# Patient Record
Sex: FEMALE | Race: WHITE | Employment: OTHER | URBAN - METROPOLITAN AREA
[De-identification: names, ages, dates, MRNs, and addresses within clinical notes are randomized per-mention and may not be internally consistent; named-entity substitution may affect disease eponyms.]

---

## 2017-02-16 RX ORDER — LOSARTAN POTASSIUM 25 MG/1
TABLET ORAL
Qty: 90 TABLET | Refills: 0 | Status: SHIPPED | OUTPATIENT
Start: 2017-02-16 | End: 2017-05-15 | Stop reason: SDUPTHER

## 2017-05-15 RX ORDER — LOSARTAN POTASSIUM 25 MG/1
TABLET ORAL
Qty: 30 TABLET | Refills: 0 | Status: SHIPPED | OUTPATIENT
Start: 2017-05-15

## 2018-05-29 RX ORDER — LOSARTAN POTASSIUM 25 MG/1
TABLET ORAL
Qty: 90 TABLET | Refills: 0 | OUTPATIENT
Start: 2018-05-29

## 2018-05-30 RX ORDER — LOSARTAN POTASSIUM 25 MG/1
TABLET ORAL
Qty: 90 TABLET | Refills: 0 | OUTPATIENT
Start: 2018-05-30

## 2018-09-10 ENCOUNTER — HOSPITAL ENCOUNTER (OUTPATIENT)
Dept: SURGERY | Age: 68
Discharge: HOME | End: 2018-09-10
Payer: MEDICARE

## 2018-09-10 ENCOUNTER — HOSPITAL ENCOUNTER (OUTPATIENT)
Dept: PHYSICAL THERAPY | Age: 68
Discharge: HOME | End: 2018-09-10
Payer: MEDICARE

## 2018-09-10 DIAGNOSIS — R06.83 SNORING: Primary | ICD-10-CM

## 2018-09-10 LAB
ANION GAP SERPL CALC-SCNC: 7 MMOL/L (ref 7–16)
APPEARANCE UR: ABNORMAL
APTT PPP: 26.9 SEC (ref 23.2–35.3)
ATRIAL RATE: 53 BPM
BACTERIA SPEC CULT: NORMAL
BACTERIA URNS QL MICRO: 0 /HPF
BASOPHILS # BLD: 0.1 K/UL (ref 0–0.2)
BASOPHILS NFR BLD: 1 % (ref 0–2)
BILIRUB UR QL: NEGATIVE
BUN SERPL-MCNC: 23 MG/DL (ref 8–23)
CALCIUM SERPL-MCNC: 9.4 MG/DL (ref 8.3–10.4)
CALCULATED P AXIS, ECG09: 56 DEGREES
CALCULATED R AXIS, ECG10: 60 DEGREES
CALCULATED T AXIS, ECG11: 60 DEGREES
CASTS URNS QL MICRO: ABNORMAL /LPF
CHLORIDE SERPL-SCNC: 107 MMOL/L (ref 98–107)
CO2 SERPL-SCNC: 28 MMOL/L (ref 21–32)
COLOR UR: YELLOW
CREAT SERPL-MCNC: 0.8 MG/DL (ref 0.6–1)
DIAGNOSIS, 93000: NORMAL
DIFFERENTIAL METHOD BLD: NORMAL
EOSINOPHIL # BLD: 0.4 K/UL (ref 0–0.8)
EOSINOPHIL NFR BLD: 7 % (ref 0.5–7.8)
EPI CELLS #/AREA URNS HPF: ABNORMAL /HPF
ERYTHROCYTE [DISTWIDTH] IN BLOOD BY AUTOMATED COUNT: 13.6 %
GLUCOSE SERPL-MCNC: 62 MG/DL (ref 65–100)
GLUCOSE UR STRIP.AUTO-MCNC: NEGATIVE MG/DL
HCT VFR BLD AUTO: 40.6 % (ref 35.8–46.3)
HGB BLD-MCNC: 12.8 G/DL (ref 11.7–15.4)
HGB UR QL STRIP: NEGATIVE
IMM GRANULOCYTES # BLD: 0 K/UL (ref 0–0.5)
IMM GRANULOCYTES NFR BLD AUTO: 0 % (ref 0–5)
INR PPP: 1
KETONES UR QL STRIP.AUTO: NEGATIVE MG/DL
LEUKOCYTE ESTERASE UR QL STRIP.AUTO: ABNORMAL
LYMPHOCYTES # BLD: 1.2 K/UL (ref 0.5–4.6)
LYMPHOCYTES NFR BLD: 20 % (ref 13–44)
MCH RBC QN AUTO: 28.6 PG (ref 26.1–32.9)
MCHC RBC AUTO-ENTMCNC: 31.5 G/DL (ref 31.4–35)
MCV RBC AUTO: 90.6 FL (ref 79.6–97.8)
MONOCYTES # BLD: 0.6 K/UL (ref 0.1–1.3)
MONOCYTES NFR BLD: 11 % (ref 4–12)
NEUTS SEG # BLD: 3.6 K/UL (ref 1.7–8.2)
NEUTS SEG NFR BLD: 61 % (ref 43–78)
NITRITE UR QL STRIP.AUTO: NEGATIVE
NRBC # BLD: 0 K/UL (ref 0–0.2)
P-R INTERVAL, ECG05: 148 MS
PH UR STRIP: 5 [PH] (ref 5–9)
PLATELET # BLD AUTO: 255 K/UL (ref 150–450)
PMV BLD AUTO: 10.4 FL (ref 9.4–12.3)
POTASSIUM SERPL-SCNC: 3.8 MMOL/L (ref 3.5–5.1)
PROT UR STRIP-MCNC: NEGATIVE MG/DL
PROTHROMBIN TIME: 13.5 SEC (ref 11.5–14.5)
Q-T INTERVAL, ECG07: 448 MS
QRS DURATION, ECG06: 90 MS
QTC CALCULATION (BEZET), ECG08: 420 MS
RBC # BLD AUTO: 4.48 M/UL (ref 4.05–5.2)
RBC #/AREA URNS HPF: ABNORMAL /HPF
SERVICE CMNT-IMP: NORMAL
SODIUM SERPL-SCNC: 142 MMOL/L (ref 136–145)
SP GR UR REFRACTOMETRY: 1.02 (ref 1–1.02)
UROBILINOGEN UR QL STRIP.AUTO: 0.2 EU/DL (ref 0.2–1)
VENTRICULAR RATE, ECG03: 53 BPM
WBC # BLD AUTO: 6 K/UL (ref 4.3–11.1)
WBC URNS QL MICRO: ABNORMAL /HPF

## 2018-09-10 PROCEDURE — G8980 MOBILITY D/C STATUS: HCPCS

## 2018-09-10 PROCEDURE — 97161 PT EVAL LOW COMPLEX 20 MIN: CPT

## 2018-09-10 PROCEDURE — 80048 BASIC METABOLIC PNL TOTAL CA: CPT

## 2018-09-10 PROCEDURE — 77030027138 HC INCENT SPIROMETER -A

## 2018-09-10 PROCEDURE — G8979 MOBILITY GOAL STATUS: HCPCS

## 2018-09-10 PROCEDURE — 85610 PROTHROMBIN TIME: CPT

## 2018-09-10 PROCEDURE — G8978 MOBILITY CURRENT STATUS: HCPCS

## 2018-09-10 PROCEDURE — 81001 URINALYSIS AUTO W/SCOPE: CPT

## 2018-09-10 PROCEDURE — 93005 ELECTROCARDIOGRAM TRACING: CPT | Performed by: ANESTHESIOLOGY

## 2018-09-10 PROCEDURE — 87641 MR-STAPH DNA AMP PROBE: CPT

## 2018-09-10 PROCEDURE — 85730 THROMBOPLASTIN TIME PARTIAL: CPT

## 2018-09-10 PROCEDURE — 85025 COMPLETE CBC W/AUTO DIFF WBC: CPT

## 2018-09-10 RX ORDER — FLUOXETINE 10 MG/1
20 CAPSULE ORAL DAILY
COMMUNITY

## 2018-09-10 RX ORDER — VERAPAMIL HYDROCHLORIDE 120 MG/1
120 TABLET, FILM COATED, EXTENDED RELEASE ORAL DAILY
COMMUNITY
End: 2018-12-03 | Stop reason: CLARIF

## 2018-09-10 RX ORDER — ASPIRIN 81 MG/1
81 TABLET ORAL
COMMUNITY
End: 2018-09-26

## 2018-09-10 RX ORDER — NAPROXEN SODIUM 220 MG
220 TABLET ORAL AS NEEDED
COMMUNITY
End: 2018-09-26

## 2018-09-10 RX ORDER — CETIRIZINE HCL 10 MG
TABLET ORAL
COMMUNITY

## 2018-09-10 RX ORDER — VERAPAMIL HYDROCHLORIDE 120 MG/1
120 TABLET, FILM COATED ORAL DAILY
COMMUNITY
End: 2018-09-10 | Stop reason: CLARIF

## 2018-09-10 RX ORDER — VERAPAMIL HYDROCHLORIDE 120 MG/1
TABLET, FILM COATED, EXTENDED RELEASE ORAL
COMMUNITY
End: 2018-09-10 | Stop reason: CLARIF

## 2018-09-10 RX ORDER — METRONIDAZOLE 7.5 MG/G
CREAM TOPICAL
COMMUNITY
End: 2019-03-07

## 2018-09-10 RX ORDER — FLUTICASONE PROPIONATE 50 MCG
2 SPRAY, SUSPENSION (ML) NASAL AS NEEDED
COMMUNITY

## 2018-09-10 RX ORDER — ROPINIROLE 1 MG/1
1 TABLET, FILM COATED ORAL
COMMUNITY

## 2018-09-10 RX ORDER — GABAPENTIN 300 MG/1
300 CAPSULE ORAL 3 TIMES DAILY
COMMUNITY

## 2018-09-10 RX ORDER — BUTALBITAL, ACETAMINOPHEN, CAFFEINE AND CODEINE PHOSPHATE 50; 325; 40; 30 MG/1; MG/1; MG/1; MG/1
CAPSULE ORAL AS NEEDED
COMMUNITY
End: 2018-09-26

## 2018-09-10 NOTE — PROGRESS NOTES
Donn Rosas  : [birth year](67 y.o.) Joint Glory Spicer at [address], [place] [address].  [phone]       Physical Therapy Prehab Plan of Treatment and Evaluation Summary:9/10/2018    ICD-10: Treatment Diagnosis:   · Pain in Right Knee (M25.561)  · Stiffness of Right Knee, Not elsewhere classified (M25.661)  Precautions/Allergies:   Pcn [penicillins]  MEDICAL/REFERRING DIAGNOSIS:  Unilateral primary osteoarthritis, right knee [M17.11]  REFERRING PHYSICIAN: Naresh Colby, *  DATE OF SURGERY: 18   Assessment:   Comments:  Scheduled for R TKA. Plans to go home at discharge with support of spouse. PROBLEM LIST (Impacting functional limitations):  Ms. Justine Mendenhall presents with the following right lower extremity(s) problems:  1. Gait  2. Home Exercise Program  3. Pain   INTERVENTIONS PLANNED:  1. Home Exercise Program  2. Educational Discussion     TREATMENT PLAN: Effective Dates: 9/10/2018 TO 9/10/2018. Frequency/Duration: Patient to continue to perform home exercise program at least twice per day up until her surgery. GOALS: (Goals have been discussed and agreed upon with patient.)  Discharge Goals: Time Frame: 1 Day  1. Patient will demonstrate independence with a home exercise program designed to increase functional technique and pain control to minimize functional deficits and optimize patient for total joint replacement. Rehabilitation Potential For Stated Goals: Good  Regarding Donn Rosas therapy, I certify that the treatment plan above will be carried out by a therapist or under their direction.   Thank you for this referral,  Shanice Warren, PT               HISTORY:   Present Symptoms:  Pain Intensity 1: 4  Pain Location 1: Knee  Pain Orientation 1: Right   History of Present Injury/Illness (Reason for Referral):  Medical/Referring Diagnosis: Unilateral primary osteoarthritis, right knee [M17.11]   Past Medical History/Comorbidities:   Ms. Shanda Kidd  has a past medical history of Arthritis; Chronic pain; Environmental allergies; Hypertension; Ill-defined condition; Migraine; Psychiatric disorder; RLS (restless legs syndrome); and Rosacea. She also has no past medical history of Adverse effect of anesthesia; Difficult intubation; Malignant hyperthermia due to anesthesia; Nausea & vomiting; or Pseudocholinesterase deficiency. Ms. Shanda Kidd  has a past surgical history that includes pr abdomen surgery proc unlisted; hx appendectomy; and hx dilation and curettage. Social History/Living Environment:   Home Environment: Private residence  # Steps to Enter: 1  One/Two Story Residence: Two story, live on 1st floor  # of Interior Steps: 15  Interior Rails: Right  Living Alone: No  Support Systems: Spouse/Significant Other/Partner  Patient Expects to be Discharged to[de-identified] Private residence  Current DME Used/Available at Home: None  Tub or Shower Type: Shower  Work/Activity:  retired  Dominant Side:  RIGHT  Current Medications:  See Pre-assessment nursing note   Number of Personal Factors/Comorbidities that affect the Plan of Care: 0: LOW COMPLEXITY   EXAMINATION:   ADLs (Current Functional Status):   Ambulation:  [x] Independent  [] Walk Indoors Only  [] Walk Outdoors  [] Use Assistive Device  [] Use Wheelchair Only Dressing:  [x] Independent  Requires Assistance from Someone for:  [] Sock/Shoes  [] Pants  [] Everything   Bathing/Showering:   [x] Independent  [] Requires Assistance from Someone  [] [address]:  [x] Routine house work  [] Light Housework Only  [] None   Observation/Orthostatic Postural Assessment: Forward head, Rounded shoulders  ROM/Flexibility:   AROM: Within functional limits (B knees 0-125)                           Strength:   Strength:  Within functional limits                  Functional Mobility:         Stand to Sit: Independent, Additional time  Sit to Stand: Independent, Additional time  Distance (ft): 500 Feet (ft)  Ambulation - Level of Assistance: Independent  Speed/Tara: Slow  Stance: Right decreased  Gait Abnormalities: Antalgic          Balance:    Sitting: Intact  Standing: Intact   Body Structures Involved:  1. Bones  2. Joints  3. Muscles Body Functions Affected:  1. Neuromusculoskeletal  2. Movement Related Activities and Participation Affected:  1. General Tasks and Demands  2. Mobility   Number of elements that affect the Plan of Care: 3: MODERATE COMPLEXITY   CLINICAL PRESENTATION:   Presentation: Stable and uncomplicated: LOW COMPLEXITY   CLINICAL DECISION MAKING:   Outcome Measure: Tool Used: Lower Extremity Functional Scale (LEFS)  Score:  Initial: 33/80 Most Recent: X/80 (Date: -- )   Interpretation of Score: 20 questions each scored on a 5 point scale with 0 representing \"extreme difficulty or unable to perform\" and 4 representing \"no difficulty\". The lower the score, the greater the functional disability. 80/80 represents no disability. Minimal detectable change is 9 points. Score 80 79-65 64-49 48-33 32-17 16-1 0   Modifier CH CI CJ CK CL CM CN     ? Mobility - Walking and Moving Around:     - CURRENT STATUS: CK - 40%-59% impaired, limited or restricted    - GOAL STATUS: CK - 40%-59% impaired, limited or restricted    - D/C STATUS:  CK - 40%-59% impaired, limited or restricted  Medical Necessity:   · Ms. Sneha Tenorio is expected to optimize her lower extremity strength and ROM in preparation for joint replacement surgery. Reason for Services/Other Comments:  · Achieve baseline assesment of musculoskeletal system, functional mobility and home environment. , educate in PT HEP in preparation for surgery, educate in hospital plan of care.    Use of outcome tool(s) and clinical judgement create a POC that gives a: Clear prediction of patient's progress: LOW COMPLEXITY   TREATMENT:   Treatment/Session Assessment:  Patient was instructed in PT- HEP to increase strength and ROM in LEs. Answered all questions. · Post session pain:  4  · Compliance with Program/Exercises: anticipate compliance.   Total Treatment Duration:  PT Patient Time In/Time Out  Time In: 1000  Time Out: [address], PT

## 2018-09-10 NOTE — PERIOP NOTES
Recent Results (from the past 12 hour(s))   CBC WITH AUTOMATED DIFF    Collection Time: 09/10/18  9:30 AM   Result Value Ref Range    WBC 6.0 4.3 - 11.1 K/uL    RBC 4.48 4.05 - 5.2 M/uL    HGB 12.8 11.7 - 15.4 g/dL    HCT 40.6 35.8 - 46.3 %    MCV 90.6 79.6 - 97.8 FL    MCH 28.6 26.1 - 32.9 PG    MCHC 31.5 31.4 - 35.0 g/dL    RDW 13.6 %    PLATELET 917 914 - 187 K/uL    MPV 10.4 9.4 - 12.3 FL    ABSOLUTE NRBC 0.00 0.0 - 0.2 K/uL    DF AUTOMATED      NEUTROPHILS 61 43 - 78 %    LYMPHOCYTES 20 13 - 44 %    MONOCYTES 11 4.0 - 12.0 %    EOSINOPHILS 7 0.5 - 7.8 %    BASOPHILS 1 0.0 - 2.0 %    IMMATURE GRANULOCYTES 0 0.0 - 5.0 %    ABS. NEUTROPHILS 3.6 1.7 - 8.2 K/UL    ABS. LYMPHOCYTES 1.2 0.5 - 4.6 K/UL    ABS. MONOCYTES 0.6 0.1 - 1.3 K/UL    ABS. EOSINOPHILS 0.4 0.0 - 0.8 K/UL    ABS. BASOPHILS 0.1 0.0 - 0.2 K/UL    ABS. IMM.  GRANS. 0.0 0.0 - 0.5 K/UL   PROTHROMBIN TIME + INR    Collection Time: 09/10/18  9:30 AM   Result Value Ref Range    Prothrombin time 13.5 11.5 - 14.5 sec    INR 1.0     PTT    Collection Time: 09/10/18  9:30 AM   Result Value Ref Range    aPTT 26.9 23.2 - 43.6 SEC   METABOLIC PANEL, BASIC    Collection Time: 09/10/18  9:30 AM   Result Value Ref Range    Sodium 142 136 - 145 mmol/L    Potassium 3.8 3.5 - 5.1 mmol/L    Chloride 107 98 - 107 mmol/L    CO2 28 21 - 32 mmol/L    Anion gap 7 7 - 16 mmol/L    Glucose 62 (L) 65 - 100 mg/dL    BUN 23 8 - 23 MG/DL    Creatinine 0.80 0.6 - 1.0 MG/DL    GFR est AA >60 >60 ml/min/1.73m2    GFR est non-AA >60 >60 ml/min/1.73m2    Calcium 9.4 8.3 - 10.4 MG/DL   URINALYSIS W/ RFLX MICROSCOPIC    Collection Time: 09/10/18  9:30 AM   Result Value Ref Range    Color YELLOW      Appearance CLOUDY      Specific gravity 1.022 1.001 - 1.023      pH (UA) 5.0 5.0 - 9.0      Protein NEGATIVE  NEG mg/dL    Glucose NEGATIVE  mg/dL    Ketone NEGATIVE  NEG mg/dL    Bilirubin NEGATIVE  NEG      Blood NEGATIVE  NEG      Urobilinogen 0.2 0.2 - 1.0 EU/dL    Nitrites NEGATIVE  NEG Leukocyte Esterase MODERATE (A) NEG      WBC 20-50 0 /hpf    RBC 0-3 0 /hpf    Epithelial cells 0-3 0 /hpf    Bacteria 0 0 /hpf    Casts 5-10 0 /lpf   EKG, 12 LEAD, INITIAL    Collection Time: 09/10/18  9:49 AM   Result Value Ref Range    Ventricular Rate 53 BPM    Atrial Rate 53 BPM    P-R Interval 148 ms    QRS Duration 90 ms    Q-T Interval 448 ms    QTC Calculation (Bezet) 420 ms    Calculated P Axis 56 degrees    Calculated R Axis 60 degrees    Calculated T Axis 60 degrees    Diagnosis       Sinus bradycardia  Otherwise normal ECG  No previous ECGs available

## 2018-09-10 NOTE — PERIOP NOTES
Recent Results (from the past 12 hour(s))   CBC WITH AUTOMATED DIFF    Collection Time: 09/10/18  9:30 AM   Result Value Ref Range    WBC 6.0 4.3 - 11.1 K/uL    RBC 4.48 4.05 - 5.2 M/uL    HGB 12.8 11.7 - 15.4 g/dL    HCT 40.6 35.8 - 46.3 %    MCV 90.6 79.6 - 97.8 FL    MCH 28.6 26.1 - 32.9 PG    MCHC 31.5 31.4 - 35.0 g/dL    RDW 13.6 %    PLATELET 235 111 - 682 K/uL    MPV 10.4 9.4 - 12.3 FL    ABSOLUTE NRBC 0.00 0.0 - 0.2 K/uL    DF AUTOMATED      NEUTROPHILS 61 43 - 78 %    LYMPHOCYTES 20 13 - 44 %    MONOCYTES 11 4.0 - 12.0 %    EOSINOPHILS 7 0.5 - 7.8 %    BASOPHILS 1 0.0 - 2.0 %    IMMATURE GRANULOCYTES 0 0.0 - 5.0 %    ABS. NEUTROPHILS 3.6 1.7 - 8.2 K/UL    ABS. LYMPHOCYTES 1.2 0.5 - 4.6 K/UL    ABS. MONOCYTES 0.6 0.1 - 1.3 K/UL    ABS. EOSINOPHILS 0.4 0.0 - 0.8 K/UL    ABS. BASOPHILS 0.1 0.0 - 0.2 K/UL    ABS. IMM.  GRANS. 0.0 0.0 - 0.5 K/UL   PROTHROMBIN TIME + INR    Collection Time: 09/10/18  9:30 AM   Result Value Ref Range    Prothrombin time 13.5 11.5 - 14.5 sec    INR 1.0     PTT    Collection Time: 09/10/18  9:30 AM   Result Value Ref Range    aPTT 26.9 23.2 - 39.8 SEC   METABOLIC PANEL, BASIC    Collection Time: 09/10/18  9:30 AM   Result Value Ref Range    Sodium 142 136 - 145 mmol/L    Potassium 3.8 3.5 - 5.1 mmol/L    Chloride 107 98 - 107 mmol/L    CO2 28 21 - 32 mmol/L    Anion gap 7 7 - 16 mmol/L    Glucose 62 (L) 65 - 100 mg/dL    BUN 23 8 - 23 MG/DL    Creatinine 0.80 0.6 - 1.0 MG/DL    GFR est AA >60 >60 ml/min/1.73m2    GFR est non-AA >60 >60 ml/min/1.73m2    Calcium 9.4 8.3 - 10.4 MG/DL   URINALYSIS W/ RFLX MICROSCOPIC    Collection Time: 09/10/18  9:30 AM   Result Value Ref Range    Color YELLOW      Appearance CLOUDY      Specific gravity 1.022 1.001 - 1.023      pH (UA) 5.0 5.0 - 9.0      Protein NEGATIVE  NEG mg/dL    Glucose NEGATIVE  mg/dL    Ketone NEGATIVE  NEG mg/dL    Bilirubin NEGATIVE  NEG      Blood NEGATIVE  NEG      Urobilinogen 0.2 0.2 - 1.0 EU/dL    Nitrites NEGATIVE  NEG Leukocyte Esterase MODERATE (A) NEG      WBC 20-50 0 /hpf    RBC 0-3 0 /hpf    Epithelial cells 0-3 0 /hpf    Bacteria 0 0 /hpf    Casts 5-10 0 /lpf   EKG, 12 LEAD, INITIAL    Collection Time: 09/10/18  9:49 AM   Result Value Ref Range    Ventricular Rate 53 BPM    Atrial Rate 53 BPM    P-R Interval 148 ms    QRS Duration 90 ms    Q-T Interval 448 ms    QTC Calculation (Bezet) 420 ms    Calculated P Axis 56 degrees    Calculated R Axis 60 degrees    Calculated T Axis 60 degrees    Diagnosis       Sinus bradycardia  Otherwise normal ECG  No previous ECGs available

## 2018-09-10 NOTE — PERIOP NOTES
Patient verified name, , and surgery as listed in [facility]. Type 3 surgery, Joint assessment complete. Labs per surgeon: cbc,bmp,pt,ptt,ua ; results within limits. (Low glucose noted: copy of labs routed to PCP and surgeon.)  Mssa pending. Labs per anesthesia protocol: included in surgeon's orders. EKG: today - within anesthesia limits. Noted in EMR: heart cath report dated 16 \"near normal coronary arteries\". Hibiclens and instructions to return bottle on DOS given per hospital policy. Nasal Swab collected per MD order and instructions for Mupirocin nasal ointment if required. Patient provided with handouts including Guide to Surgery, Pain Management, Hand Hygiene, Blood Transfusion Education, and [facility] Brochure. Patient answered medical/surgical history questions at their best of ability. All prior to admission medications documented in [facility] Care. Original medication prescription bottle not visualized during patient appointment. Patient instructed to hold all vitamins 7 days prior to surgery and NSAIDS 5 days prior to surgery. Medications to be held: aleve    Patient instructed to continue previous medications as prescribed prior to surgery and to take the following medications the day of surgery according to anesthesia guidelines with a small sip of water: verapamil, fluoxetine. Patient teach back successful and patient demonstrates knowledge of instruction.

## 2018-09-11 VITALS
WEIGHT: 150 LBS | HEIGHT: 64 IN | TEMPERATURE: 96.7 F | SYSTOLIC BLOOD PRESSURE: 155 MMHG | BODY MASS INDEX: 25.61 KG/M2 | RESPIRATION RATE: 16 BRPM | HEART RATE: 53 BPM | OXYGEN SATURATION: 98 % | DIASTOLIC BLOOD PRESSURE: 85 MMHG

## 2018-09-11 NOTE — PROGRESS NOTES
09/10/18 0900   Oxygen Therapy   O2 Sat (%) 97 %   Pulse via Oximetry 64 beats per minute   O2 Device Room air   Pre-Treatment   Breath Sounds Bilateral Clear   Pre FEV1 (liters) 1.6 liters   % Predicted 71   Incentive Spirometry Treatment   Actual Volume (ml) 1500 ml   Sleep Disorder Breathing Screen:     Patient reports symptoms of:   · Snoring   · Excessive daytime sleepiness   · HTN  · TIRED \"ALL THE TIME\"  · STOP-BANG _4___  · FREIDMAN 3-4  · Height__5'4\"___ Weight_150 lbs____  · MORNING HEADACHES     Refer patient for sleep study based on above assessment. Initial respiratory Assessment completed with pt. Pt was interviewed and evaluated in Joint camp prior to surgery. Patient ID:  Della Bosch  [id number]  1 y.o.  1950  Surgeon: Dr. Domingo Zamorano  Date of Surgery: 9/24/2018  Procedure: Total Right Knee Arthroplasty  Primary Care Physician: Ayla Winter -[phone]  Specialists:                                  Pt instructed in the use of Incentive Spirometry. Pt instructed to bring Incentive Spirometer back on date of surgery & to start using Is upon return to pt room.     Pt taught proper cough technique    History of smoking:   NONE                                                       Quit date:           Secondhand smoke:PARENTS      Past procedures with Oxygen desaturation:NONE    Past Medical History:   Diagnosis Date    Arthritis     Chronic pain     Environmental allergies     Hypertension     Ill-defined condition     pt reports some numbness, nerve discomfort lower leg -controlled with medication    Migraine     Psychiatric disorder     pt takes med for anxiety, depression    RLS (restless legs syndrome)     Rosacea                                                                                                                                                      Respiratory history:HX OF PNA                                 SOB  ON EXERTION Respiratory meds:  NA                                       FAMILY PRESENT:            SPOUSE,                                                                                         PAST SLEEP STUDY:                      NO  HX OF KAYLEE:                                    NO                                     KAYLEE assessment:                                               SLEEPS ON SIDE     &        BACK                       PT SLEEPS WITH WEDGES AS SHE WOULD WAKE UP WITH HEADACHES EVERY MORNING                                        PHYSICAL EXAM   Body mass index is 25.75 kg/(m^2). Visit Vitals    /85 (BP 1 Location: Left arm, BP Patient Position: At rest;Sitting)    Pulse (!) 53    Temp 96.7 °F (35.9 °C)    Resp 16    Ht 5' 4\" (1.626 m)    Wt 68 kg (150 lb)    SpO2 98%    BMI 25.75 kg/m2     Neck circumference:  34.5    cm    Loud snoring:        YES                                 Witnessed apnea or wakening gasping or choking:,             DENIES,                                                                                                    Awakens with headaches:                                             YES     DENIES    Morning or daytime tiredness/ sleepiness:                                                                                                        TIRED   Dry mouth or sore throat in morning:                                                                                      DENIES    Avila stage:  3-4    SACS score:4    STOP/BAN                              CPAP:                       NONE                                             CONT SAT HS            Referrals:  [facility]  Pt.  Phone Number:  [phone]

## 2018-09-13 PROBLEM — R06.83 SNORING: Status: ACTIVE | Noted: 2018-09-13

## 2018-09-13 NOTE — ADVANCED PRACTICE NURSE
Total Joint Surgery Preoperative Chart Review      Patient ID:  Maximus Lantigua  [id number]  17 y.o.  1950  Surgeon: Dr. Lyndel Hatchet  Date of Surgery: 9/24/2018  Procedure: Total Right Knee Arthroplasty  Primary Care Physician: Noe Rojas -[phone]  Specialty Physician(s):      Subjective:   Maximus Lantigua is a 76 y.o. WHITE OR  female who presents for preoperative evaluation for Total Right Knee arthroplasty. This is a preoperative chart review note based on data collected by the nurse at the surgical Pre-Assessment visit. Past Medical History:   Diagnosis Date    Arthritis     Chronic pain     Environmental allergies     Hypertension     Ill-defined condition     pt reports some numbness, nerve discomfort lower leg -controlled with medication    Migraine     Psychiatric disorder     pt takes med for anxiety, depression    RLS (restless legs syndrome)     Rosacea       Past Surgical History:   Procedure Laterality Date    ABDOMEN SURGERY PROC UNLISTED      HX APPENDECTOMY      HX DILATION AND CURETTAGE       History reviewed. No pertinent family history. Social History   Substance Use Topics    Smoking status: Never Smoker    Smokeless tobacco: Never Used    Alcohol use Yes      Comment: occ       Prior to Admission medications    Medication Sig Start Date End Date Taking? Authorizing Provider   codeine-butalbital-acetaminophen-caffeine (FIORICET WITH CODEINE) -34-30 mg capsule Take  by mouth as needed for Headache. Indications: migraine   Yes Historical Provider   FLUoxetine (PROZAC) 10 mg capsule Take 10 mg by mouth daily. Yes Historical Provider   gabapentin (NEURONTIN) 300 mg capsule Take 300 mg by mouth nightly. Yes Historical Provider   cetirizine (ZYRTEC) 10 mg tablet Take  by mouth nightly. Yes Historical Provider   rOPINIRole (REQUIP) 1 mg tablet Take 1 mg by mouth nightly.  Indications: Restless Legs Syndrome   Yes Historical Provider   aspirin delayed-release 81 mg tablet Take 81 mg by mouth nightly. Yes Historical Provider   fluticasone (FLONASE) 50 mcg/actuation nasal spray 2 Sprays by Both Nostrils route as needed for Rhinitis. Yes Historical Provider   metroNIDAZOLE (METROCREAM) 0.75 % topical cream Apply  to affected area nightly. Use a thin layer to affected areas after washing   Yes Historical Provider   naproxen sodium (ALEVE) 220 mg tablet Take 220 mg by mouth as needed. Stop 5 days prior to surgery per anesthesia guidelines. Yes Historical Provider   verapamil ER (CALAN-SR) 120 mg tablet Take 120 mg by mouth daily. Yes Historical Provider     Allergies   Allergen Reactions    Pcn [Penicillins] Angioedema          Objective:     Physical Exam:     Visit Vitals    /85 (BP 1 Location: Left arm, BP Patient Position: At rest;Sitting)    Pulse (!) 53    Temp 96.7 °F (35.9 °C)    Resp 16    Ht 5' 4\" (1.626 m)    Wt 68 kg (150 lb)    SpO2 98%    BMI 25.75 kg/m2        ECG:    EKG Results     Procedure 720 Value Units Date/Time    EKG, 12 LEAD, INITIAL [[id number]] Collected:  09/10/18 0949    Order Status:  Completed Updated:  09/10/18 1215     Ventricular Rate 53 BPM      Atrial Rate 53 BPM      P-R Interval 148 ms      QRS Duration 90 ms      Q-T Interval 448 ms      QTC Calculation (Bezet) 420 ms      Calculated P Axis 56 degrees      Calculated R Axis 60 degrees      Calculated T Axis 60 degrees      Diagnosis --     Sinus bradycardia  Otherwise normal ECG  No previous ECGs available  Confirmed by ROQUE FERRARI (), Rajinder Cesar ([id number]) on 9/10/2018 12:15:36 PM            Data Review:   Labs:   Results for Sadaf Collado (MRN [id number]) as of 9/13/2018 14:26   Ref.  Range 9/10/2018 09:30   Sodium Latest Ref Range: 136 - 145 mmol/L 142   Potassium Latest Ref Range: 3.5 - 5.1 mmol/L 3.8   Chloride Latest Ref Range: 98 - 107 mmol/L 107   CO2 Latest Ref Range: 21 - 32 mmol/L 28   Anion gap Latest Ref Range: 7 - 16 mmol/L 7   Glucose Latest Ref Range: 65 - 100 mg/dL 62 (L)   BUN Latest Ref Range: 8 - 23 MG/DL 23   Creatinine Latest Ref Range: 0.6 - 1.0 MG/DL 0.80   Calcium Latest Ref Range: 8.3 - 10.4 MG/DL 9.4   GFR est non-AA Latest Ref Range: >60 ml/min/1.73m2 >60   GFR est AA Latest Ref Range: >60 ml/min/1.73m2 >60         Problem List:  )  Patient Active Problem List   Diagnosis Code    Snoring R06.83       Total Joint Surgery Pre-Assessment Recommendations:           Patient reports the symptoms of snoring, observed apnea and /or excessive daytime sleepiness. Will refer patient for HST based on above assessment. Recommend continuous saturation monitoring hours of sleep, during hospitalization.           Signed By: PATRIC Wild    September 13, 2018

## 2018-09-20 NOTE — H&P
[id number] [facility]  Pre Operative History and Physical Exam    Patient ID:  Manette Meigs  [id number]  69 y.o.  1950    Today: September 20, 2018       Assessment:   1. Arthritis of the right knee        Plan:    1. Proceed with scheduled Procedure(s) (LRB):  KNEE ARTHROPLASTY TOTAL/ RIGHT/ MARCI/ FNB (Right)            CC:  Right knee pain    HPI:   The patient has end stage arthritis of the right knee. The patient was evaluated and examined during a consultation prior to this office visit. There have been no changes to the patient's orthopedic condition since the initial consultation. The patient has failed previous conservative treatment for this condition including antiinflammatories , and lifestyle modifications. The necessity for joint replacement is present. The patient will be admitted the day of surgery for Procedure(s) (LRB):  KNEE ARTHROPLASTY TOTAL/ RIGHT/ MARCI/ FNB (Right)      Past Medical/Surgical History:  Past Medical History:   Diagnosis Date    Arthritis     Chronic pain     Environmental allergies     Hypertension     Ill-defined condition     pt reports some numbness, nerve discomfort lower leg -controlled with medication    Migraine     Psychiatric disorder     pt takes med for anxiety, depression    RLS (restless legs syndrome)     Rosacea      Past Surgical History:   Procedure Laterality Date    ABDOMEN SURGERY PROC UNLISTED      HX APPENDECTOMY      HX DILATION AND CURETTAGE          Allergies:    Allergies   Allergen Reactions    Pcn [Penicillins] Angioedema        Physical Exam:   General: NAD, Alert, Oriented, Appears their stated age     [de-identified]: NC/AT, PERRL    Skin: No rashes, lesions or wounds seen      Psych: normal affect      Heart: Regular Rate, Rhythm     Lungs: unlabored respirations, normal breath sounds     Abdomen: Soft and non-distended     Ortho: Pain with limited ROM of the right knee    Neuro: no focal defects, sensation is equal bilaterally     Lymph: no lymphadenopathy     Meds:   No current facility-administered medications for this encounter. Current Outpatient Prescriptions   Medication Sig    codeine-butalbital-acetaminophen-caffeine (FIORICET WITH CODEINE) -61-30 mg capsule Take  by mouth as needed for Headache. Indications: migraine    FLUoxetine (PROZAC) 10 mg capsule Take 10 mg by mouth daily.  gabapentin (NEURONTIN) 300 mg capsule Take 300 mg by mouth nightly.  cetirizine (ZYRTEC) 10 mg tablet Take  by mouth nightly.  rOPINIRole (REQUIP) 1 mg tablet Take 1 mg by mouth nightly. Indications: Restless Legs Syndrome    aspirin delayed-release 81 mg tablet Take 81 mg by mouth nightly.  fluticasone (FLONASE) 50 mcg/actuation nasal spray 2 Sprays by Both Nostrils route as needed for Rhinitis.  metroNIDAZOLE (METROCREAM) 0.75 % topical cream Apply  to affected area nightly. Use a thin layer to affected areas after washing    naproxen sodium (ALEVE) 220 mg tablet Take 220 mg by mouth as needed. Stop 5 days prior to surgery per anesthesia guidelines.  verapamil ER (CALAN-SR) 120 mg tablet Take 120 mg by mouth daily.          Labs:  Hospital Outpatient Visit on 09/10/2018   Component Date Value Ref Range Status    WBC 09/10/2018 6.0  4.3 - 11.1 K/uL Final    RBC 09/10/2018 4.48  4.05 - 5.2 M/uL Final    HGB 09/10/2018 12.8  11.7 - 15.4 g/dL Final    HCT 09/10/2018 40.6  35.8 - 46.3 % Final    MCV 09/10/2018 90.6  79.6 - 97.8 FL Final    MCH 09/10/2018 28.6  26.1 - 32.9 PG Final    MCHC 09/10/2018 31.5  31.4 - 35.0 g/dL Final    RDW 09/10/2018 13.6  % Final    PLATELET 38/87/0946 866  150 - 450 K/uL Final    MPV 09/10/2018 10.4  9.4 - 12.3 FL Final    ABSOLUTE NRBC 09/10/2018 0.00  0.0 - 0.2 K/uL Final    **Note: Absolute NRBC parameter is now reported with Hemogram**    DF 09/10/2018 AUTOMATED    Final    NEUTROPHILS 09/10/2018 61  43 - 78 % Final    LYMPHOCYTES 09/10/2018 20  13 - 44 % Final    MONOCYTES 09/10/2018 11  4.0 - 12.0 % Final    EOSINOPHILS 09/10/2018 7  0.5 - 7.8 % Final    BASOPHILS 09/10/2018 1  0.0 - 2.0 % Final    IMMATURE GRANULOCYTES 09/10/2018 0  0.0 - 5.0 % Final    ABS. NEUTROPHILS 09/10/2018 3.6  1.7 - 8.2 K/UL Final    ABS. LYMPHOCYTES 09/10/2018 1.2  0.5 - 4.6 K/UL Final    ABS. MONOCYTES 09/10/2018 0.6  0.1 - 1.3 K/UL Final    ABS. EOSINOPHILS 09/10/2018 0.4  0.0 - 0.8 K/UL Final    ABS. BASOPHILS 09/10/2018 0.1  0.0 - 0.2 K/UL Final    ABS. IMM. GRANS. 09/10/2018 0.0  0.0 - 0.5 K/UL Final    Prothrombin time 09/10/2018 13.5  11.5 - 14.5 sec Final    INR 09/10/2018 1.0    Final    Comment: Suggested therapeutic INR range:  Venous thrombosis and embolus  2.0-3.0  Prosthetic heart valve         2.5-3.5  ** Note new reference range and method **      aPTT 09/10/2018 26.9  23.2 - 35.3 SEC Final    Comment: Heparin Therapeutic Range = 74 - 123 seconds  In addition to factor deficiency, monitoring heparin therapy, etc., evaluation of a prolonged aPTT result should include consideration of preanalytic variables such as heparin flush contamination, specimen integrity issues, etc.  ** Note new reference range and method **      Sodium 09/10/2018 142  136 - 145 mmol/L Final    Potassium 09/10/2018 3.8  3.5 - 5.1 mmol/L Final    Chloride 09/10/2018 107  98 - 107 mmol/L Final    CO2 09/10/2018 28  21 - 32 mmol/L Final    Anion gap 09/10/2018 7  7 - 16 mmol/L Final    Glucose 09/10/2018 62* 65 - 100 mg/dL Final    Comment: 47 - 60 mg/dl Consistent with, but not fully diagnostic of hypoglycemia.   101 - 125 mg/dl Impaired fasting glucose/consistent with pre-diabetes mellitus  > 126 mg/dl Fasting glucose consistent with overt diabetes mellitus      BUN 09/10/2018 23  8 - 23 MG/DL Final    Creatinine 09/10/2018 0.80  0.6 - 1.0 MG/DL Final    GFR est AA 09/10/2018 >60  >60 ml/min/1.73m2 Final    GFR est non-AA 09/10/2018 >60  >60 ml/min/1.73m2 Final    Comment: (NOTE)  Estimated GFR is calculated using the Modification of Diet in Renal   Disease (MDRD) Study equation, reported for both  Americans   (GFRAA) and non- Americans (GFRNA), and normalized to 1.73m2   body surface area. The physician must decide which value applies to   the patient. The MDRD study equation should only be used in   individuals age 25 or older. It has not been validated for the   following: pregnant women, patients with serious comorbid conditions,   or on certain medications, or persons with extremes of body size,   muscle mass, or nutritional status.  Calcium 09/10/2018 9.4  8.3 - 10.4 MG/DL Final    Color 09/10/2018 YELLOW    Final    Appearance 09/10/2018 CLOUDY    Final    Specific gravity 09/10/2018 1.022  1.001 - 1.023   Final    pH (UA) 09/10/2018 5.0  5.0 - 9.0   Final    Protein 09/10/2018 NEGATIVE   NEG mg/dL Final    Glucose 09/10/2018 NEGATIVE   mg/dL Final    Ketone 09/10/2018 NEGATIVE   NEG mg/dL Final    Bilirubin 09/10/2018 NEGATIVE   NEG   Final    Blood 09/10/2018 NEGATIVE   NEG   Final    Urobilinogen 09/10/2018 0.2  0.2 - 1.0 EU/dL Final    Nitrites 09/10/2018 NEGATIVE   NEG   Final    Leukocyte Esterase 09/10/2018 MODERATE* NEG   Final    WBC 09/10/2018 20-50  0 /hpf Final    RBC 09/10/2018 0-3  0 /hpf Final    Epithelial cells 09/10/2018 0-3  0 /hpf Final    Bacteria 09/10/2018 0  0 /hpf Final    Casts 09/10/2018 5-10  0 /lpf Final    HYALINE    Special Requests: 09/10/2018 NO SPECIAL REQUESTS    Final    Culture result: 09/10/2018 SA target not detected. A MRSA NEGATIVE, SA NEGATIVE test result does not preclude MRSA or SA nasal colonization.     Final    Ventricular Rate 09/10/2018 53  BPM Final    Atrial Rate 09/10/2018 53  BPM Final    P-R Interval 09/10/2018 148  ms Final    QRS Duration 09/10/2018 90  ms Final    Q-T Interval 09/10/2018 448  ms Final    QTC Calculation (Bezet) 09/10/2018 420  ms Final    Calculated P Axis 09/10/2018 56  degrees Final    Calculated R Axis 09/10/2018 60  degrees Final    Calculated T Axis 09/10/2018 60  degrees Final    Diagnosis 09/10/2018    Final                    Value:Sinus bradycardia  Otherwise normal ECG  No previous ECGs available  Confirmed by ROQUE FERRARI (), Marianela Colon ([id number]) on 9/10/2018 12:15:36 PM                   Patient Active Problem List   Diagnosis Code    Snoring R06.83         Signed By: GLORIA Borjas  September 20, 2018

## 2018-09-23 ENCOUNTER — ANESTHESIA EVENT (OUTPATIENT)
Dept: SURGERY | Age: 68
DRG: 470 | End: 2018-09-23
Payer: MEDICARE

## 2018-09-24 ENCOUNTER — HOSPITAL ENCOUNTER (INPATIENT)
Age: 68
LOS: 2 days | Discharge: HOME HEALTH | DRG: 470 | End: 2018-09-26
Attending: ORTHOPAEDIC SURGERY | Admitting: ORTHOPAEDIC SURGERY
Payer: MEDICARE

## 2018-09-24 ENCOUNTER — ANESTHESIA (OUTPATIENT)
Dept: SURGERY | Age: 68
DRG: 470 | End: 2018-09-24
Payer: MEDICARE

## 2018-09-24 DIAGNOSIS — M17.11 PRIMARY OSTEOARTHRITIS OF RIGHT KNEE: ICD-10-CM

## 2018-09-24 DIAGNOSIS — Z96.651 STATUS POST RIGHT KNEE REPLACEMENT: Primary | ICD-10-CM

## 2018-09-24 LAB
ABO + RH BLD: NORMAL
BLOOD GROUP ANTIBODIES SERPL: NORMAL
GLUCOSE BLD STRIP.AUTO-MCNC: 85 MG/DL (ref 65–100)
HGB BLD-MCNC: 10.5 G/DL (ref 11.7–15.4)
SPECIMEN EXP DATE BLD: NORMAL

## 2018-09-24 PROCEDURE — C1776 JOINT DEVICE (IMPLANTABLE): HCPCS | Performed by: ORTHOPAEDIC SURGERY

## 2018-09-24 PROCEDURE — 77030008467 HC STPLR SKN COVD -B: Performed by: ORTHOPAEDIC SURGERY

## 2018-09-24 PROCEDURE — 74011250636 HC RX REV CODE- 250/636: Performed by: ANESTHESIOLOGY

## 2018-09-24 PROCEDURE — 77030019557 HC ELECTRD VES SEAL MEDT -F: Performed by: ORTHOPAEDIC SURGERY

## 2018-09-24 PROCEDURE — 74011250636 HC RX REV CODE- 250/636

## 2018-09-24 PROCEDURE — 76942 ECHO GUIDE FOR BIOPSY: CPT | Performed by: ORTHOPAEDIC SURGERY

## 2018-09-24 PROCEDURE — 77030002912 HC SUT ETHBND J&J -A: Performed by: ORTHOPAEDIC SURGERY

## 2018-09-24 PROCEDURE — 77030037363 HC FEM INST CR  DISP STRY -C: Performed by: ORTHOPAEDIC SURGERY

## 2018-09-24 PROCEDURE — 0SRC0JA REPLACEMENT OF RIGHT KNEE JOINT WITH SYNTHETIC SUBSTITUTE, UNCEMENTED, OPEN APPROACH: ICD-10-PCS | Performed by: ORTHOPAEDIC SURGERY

## 2018-09-24 PROCEDURE — 76010010054 HC POST OP PAIN BLOCK: Performed by: ORTHOPAEDIC SURGERY

## 2018-09-24 PROCEDURE — 99221 1ST HOSP IP/OBS SF/LOW 40: CPT | Performed by: PHYSICAL MEDICINE & REHABILITATION

## 2018-09-24 PROCEDURE — 74011000250 HC RX REV CODE- 250: Performed by: ORTHOPAEDIC SURGERY

## 2018-09-24 PROCEDURE — 97165 OT EVAL LOW COMPLEX 30 MIN: CPT

## 2018-09-24 PROCEDURE — 77030020255 HC SOL INJ LR 1000ML BG

## 2018-09-24 PROCEDURE — 76210000016 HC OR PH I REC 1 TO 1.5 HR: Performed by: ORTHOPAEDIC SURGERY

## 2018-09-24 PROCEDURE — 77030002966 HC SUT PDS J&J -A: Performed by: ORTHOPAEDIC SURGERY

## 2018-09-24 PROCEDURE — 77030013727 HC IRR FAN PULSVC ZIMM -B: Performed by: ORTHOPAEDIC SURGERY

## 2018-09-24 PROCEDURE — 77030034849: Performed by: ORTHOPAEDIC SURGERY

## 2018-09-24 PROCEDURE — 97161 PT EVAL LOW COMPLEX 20 MIN: CPT

## 2018-09-24 PROCEDURE — 77030003602 HC NDL NRV BLK BBMI -B: Performed by: ANESTHESIOLOGY

## 2018-09-24 PROCEDURE — 77030037364 HC TIB INST CR  DISP STRY -C: Performed by: ORTHOPAEDIC SURGERY

## 2018-09-24 PROCEDURE — 77030003665 HC NDL SPN BBMI -A: Performed by: ANESTHESIOLOGY

## 2018-09-24 PROCEDURE — 77030018836 HC SOL IRR NACL ICUM -A: Performed by: ORTHOPAEDIC SURGERY

## 2018-09-24 PROCEDURE — 77030035236 HC SUT PDS STRATFX BARB J&J -B: Performed by: ORTHOPAEDIC SURGERY

## 2018-09-24 PROCEDURE — 77030035643 HC BLD SAW OSC PRECIS STRY -C: Performed by: ORTHOPAEDIC SURGERY

## 2018-09-24 PROCEDURE — 74011000302 HC RX REV CODE- 302: Performed by: ORTHOPAEDIC SURGERY

## 2018-09-24 PROCEDURE — 74011250636 HC RX REV CODE- 250/636: Performed by: PHYSICIAN ASSISTANT

## 2018-09-24 PROCEDURE — 77030012935 HC DRSG AQUACEL BMS -B: Performed by: ORTHOPAEDIC SURGERY

## 2018-09-24 PROCEDURE — 74011250636 HC RX REV CODE- 250/636: Performed by: ORTHOPAEDIC SURGERY

## 2018-09-24 PROCEDURE — 74011250637 HC RX REV CODE- 250/637: Performed by: ORTHOPAEDIC SURGERY

## 2018-09-24 PROCEDURE — 74011000258 HC RX REV CODE- 258: Performed by: ORTHOPAEDIC SURGERY

## 2018-09-24 PROCEDURE — 86901 BLOOD TYPING SEROLOGIC RH(D): CPT

## 2018-09-24 PROCEDURE — 36415 COLL VENOUS BLD VENIPUNCTURE: CPT

## 2018-09-24 PROCEDURE — 76010000171 HC OR TIME 2 TO 2.5 HR INTENSV-TIER 1: Performed by: ORTHOPAEDIC SURGERY

## 2018-09-24 PROCEDURE — 77030025452 HC KT TIB SZR TRTH DSP STRY -B: Performed by: ORTHOPAEDIC SURGERY

## 2018-09-24 PROCEDURE — 74011250637 HC RX REV CODE- 250/637: Performed by: ANESTHESIOLOGY

## 2018-09-24 PROCEDURE — 77030031139 HC SUT VCRL2 J&J -A: Performed by: ORTHOPAEDIC SURGERY

## 2018-09-24 PROCEDURE — 77030006720 HC BLD PAT RMR ZIMM -B: Performed by: ORTHOPAEDIC SURGERY

## 2018-09-24 PROCEDURE — 97110 THERAPEUTIC EXERCISES: CPT

## 2018-09-24 PROCEDURE — 76060000035 HC ANESTHESIA 2 TO 2.5 HR: Performed by: ORTHOPAEDIC SURGERY

## 2018-09-24 PROCEDURE — 85018 HEMOGLOBIN: CPT

## 2018-09-24 PROCEDURE — 86580 TB INTRADERMAL TEST: CPT | Performed by: ORTHOPAEDIC SURGERY

## 2018-09-24 PROCEDURE — 77030007880 HC KT SPN EPDRL BBMI -B: Performed by: ANESTHESIOLOGY

## 2018-09-24 PROCEDURE — 65270000029 HC RM PRIVATE

## 2018-09-24 PROCEDURE — 77030020782 HC GWN BAIR PAWS FLX 3M -B: Performed by: ANESTHESIOLOGY

## 2018-09-24 PROCEDURE — 74011250637 HC RX REV CODE- 250/637: Performed by: PHYSICIAN ASSISTANT

## 2018-09-24 PROCEDURE — 77030020263 HC SOL INJ SOD CL0.9% LFCR 1000ML

## 2018-09-24 PROCEDURE — 94762 N-INVAS EAR/PLS OXIMTRY CONT: CPT

## 2018-09-24 PROCEDURE — 74011000250 HC RX REV CODE- 250

## 2018-09-24 PROCEDURE — 77030036688 HC BLNKT CLD THER S2SG -B

## 2018-09-24 PROCEDURE — 82962 GLUCOSE BLOOD TEST: CPT

## 2018-09-24 DEVICE — COMPONENT PAT DIA35MM THK10MM SUPERIOR/INFERIOR KNEE: Type: IMPLANTABLE DEVICE | Site: KNEE | Status: FUNCTIONAL

## 2018-09-24 DEVICE — INSERT TIB SZ 4 THK13MM UNIV KNEE POLYETH CNDYL STBL PRI: Type: IMPLANTABLE DEVICE | Site: KNEE | Status: FUNCTIONAL

## 2018-09-24 DEVICE — COMPNT FEM CR TRIATHLN 3 R PA --: Type: IMPLANTABLE DEVICE | Site: KNEE | Status: FUNCTIONAL

## 2018-09-24 DEVICE — BASEPLATE TIB SZ 4 AP46MM ML70MM KNEE TRITANIUM 4 CRUCFRM: Type: IMPLANTABLE DEVICE | Site: KNEE | Status: FUNCTIONAL

## 2018-09-24 RX ORDER — HYDROMORPHONE HYDROCHLORIDE 2 MG/1
2 TABLET ORAL
Qty: 40 TAB | Refills: 0 | Status: SHIPPED | OUTPATIENT
Start: 2018-09-24 | End: 2018-12-03 | Stop reason: CLARIF

## 2018-09-24 RX ORDER — SODIUM CHLORIDE, SODIUM LACTATE, POTASSIUM CHLORIDE, CALCIUM CHLORIDE 600; 310; 30; 20 MG/100ML; MG/100ML; MG/100ML; MG/100ML
75 INJECTION, SOLUTION INTRAVENOUS CONTINUOUS
Status: DISCONTINUED | OUTPATIENT
Start: 2018-09-24 | End: 2018-09-24 | Stop reason: HOSPADM

## 2018-09-24 RX ORDER — HYDROMORPHONE HYDROCHLORIDE 2 MG/1
2 TABLET ORAL
Status: DISCONTINUED | OUTPATIENT
Start: 2018-09-24 | End: 2018-09-26 | Stop reason: HOSPADM

## 2018-09-24 RX ORDER — SODIUM CHLORIDE, SODIUM LACTATE, POTASSIUM CHLORIDE, CALCIUM CHLORIDE 600; 310; 30; 20 MG/100ML; MG/100ML; MG/100ML; MG/100ML
100 INJECTION, SOLUTION INTRAVENOUS CONTINUOUS
Status: DISCONTINUED | OUTPATIENT
Start: 2018-09-24 | End: 2018-09-24 | Stop reason: HOSPADM

## 2018-09-24 RX ORDER — AMOXICILLIN 250 MG
2 CAPSULE ORAL DAILY
Status: DISCONTINUED | OUTPATIENT
Start: 2018-09-25 | End: 2018-09-26 | Stop reason: HOSPADM

## 2018-09-24 RX ORDER — FENTANYL CITRATE 50 UG/ML
100 INJECTION, SOLUTION INTRAMUSCULAR; INTRAVENOUS ONCE
Status: COMPLETED | OUTPATIENT
Start: 2018-09-24 | End: 2018-09-24

## 2018-09-24 RX ORDER — GABAPENTIN 300 MG/1
300 CAPSULE ORAL
Status: DISCONTINUED | OUTPATIENT
Start: 2018-09-24 | End: 2018-09-26 | Stop reason: HOSPADM

## 2018-09-24 RX ORDER — ONDANSETRON 2 MG/ML
4 INJECTION INTRAMUSCULAR; INTRAVENOUS
Status: DISCONTINUED | OUTPATIENT
Start: 2018-09-24 | End: 2018-09-26 | Stop reason: HOSPADM

## 2018-09-24 RX ORDER — CEFAZOLIN SODIUM/WATER 2 G/20 ML
2 SYRINGE (ML) INTRAVENOUS EVERY 8 HOURS
Status: DISCONTINUED | OUTPATIENT
Start: 2018-09-24 | End: 2018-09-24 | Stop reason: ALTCHOICE

## 2018-09-24 RX ORDER — DEXAMETHASONE SODIUM PHOSPHATE 100 MG/10ML
10 INJECTION INTRAMUSCULAR; INTRAVENOUS ONCE
Status: COMPLETED | OUTPATIENT
Start: 2018-09-25 | End: 2018-09-25

## 2018-09-24 RX ORDER — FLUTICASONE PROPIONATE 50 MCG
2 SPRAY, SUSPENSION (ML) NASAL
Status: DISCONTINUED | OUTPATIENT
Start: 2018-09-24 | End: 2018-09-26 | Stop reason: HOSPADM

## 2018-09-24 RX ORDER — BUPIVACAINE HYDROCHLORIDE 7.5 MG/ML
INJECTION, SOLUTION INTRASPINAL AS NEEDED
Status: DISCONTINUED | OUTPATIENT
Start: 2018-09-24 | End: 2018-09-24 | Stop reason: HOSPADM

## 2018-09-24 RX ORDER — DIPHENHYDRAMINE HCL 25 MG
25 CAPSULE ORAL
Status: DISCONTINUED | OUTPATIENT
Start: 2018-09-24 | End: 2018-09-26 | Stop reason: HOSPADM

## 2018-09-24 RX ORDER — CLINDAMYCIN PHOSPHATE 600 MG/50ML
600 INJECTION INTRAVENOUS EVERY 8 HOURS
Status: COMPLETED | OUTPATIENT
Start: 2018-09-24 | End: 2018-09-24

## 2018-09-24 RX ORDER — FLUOXETINE 10 MG/1
10 CAPSULE ORAL DAILY
Status: DISCONTINUED | OUTPATIENT
Start: 2018-09-25 | End: 2018-09-26 | Stop reason: HOSPADM

## 2018-09-24 RX ORDER — DEXAMETHASONE SODIUM PHOSPHATE 4 MG/ML
INJECTION, SOLUTION INTRA-ARTICULAR; INTRALESIONAL; INTRAMUSCULAR; INTRAVENOUS; SOFT TISSUE AS NEEDED
Status: DISCONTINUED | OUTPATIENT
Start: 2018-09-24 | End: 2018-09-24 | Stop reason: HOSPADM

## 2018-09-24 RX ORDER — TRANEXAMIC ACID 100 MG/ML
INJECTION, SOLUTION INTRAVENOUS AS NEEDED
Status: DISCONTINUED | OUTPATIENT
Start: 2018-09-24 | End: 2018-09-24 | Stop reason: HOSPADM

## 2018-09-24 RX ORDER — ONDANSETRON 2 MG/ML
INJECTION INTRAMUSCULAR; INTRAVENOUS AS NEEDED
Status: DISCONTINUED | OUTPATIENT
Start: 2018-09-24 | End: 2018-09-24 | Stop reason: HOSPADM

## 2018-09-24 RX ORDER — SODIUM CHLORIDE 0.9 % (FLUSH) 0.9 %
5-10 SYRINGE (ML) INJECTION AS NEEDED
Status: DISCONTINUED | OUTPATIENT
Start: 2018-09-24 | End: 2018-09-26 | Stop reason: HOSPADM

## 2018-09-24 RX ORDER — SODIUM CHLORIDE 0.9 % (FLUSH) 0.9 %
5-10 SYRINGE (ML) INJECTION EVERY 8 HOURS
Status: DISCONTINUED | OUTPATIENT
Start: 2018-09-24 | End: 2018-09-24 | Stop reason: HOSPADM

## 2018-09-24 RX ORDER — PROPOFOL 10 MG/ML
INJECTION, EMULSION INTRAVENOUS
Status: DISCONTINUED | OUTPATIENT
Start: 2018-09-24 | End: 2018-09-24 | Stop reason: HOSPADM

## 2018-09-24 RX ORDER — HYDROMORPHONE HYDROCHLORIDE 2 MG/ML
0.5 INJECTION, SOLUTION INTRAMUSCULAR; INTRAVENOUS; SUBCUTANEOUS
Status: DISCONTINUED | OUTPATIENT
Start: 2018-09-24 | End: 2018-09-24 | Stop reason: HOSPADM

## 2018-09-24 RX ORDER — OXYCODONE HYDROCHLORIDE 5 MG/1
5 TABLET ORAL
Status: DISCONTINUED | OUTPATIENT
Start: 2018-09-24 | End: 2018-09-24 | Stop reason: HOSPADM

## 2018-09-24 RX ORDER — KETOROLAC TROMETHAMINE 30 MG/ML
INJECTION, SOLUTION INTRAMUSCULAR; INTRAVENOUS AS NEEDED
Status: DISCONTINUED | OUTPATIENT
Start: 2018-09-24 | End: 2018-09-24 | Stop reason: HOSPADM

## 2018-09-24 RX ORDER — HYDROMORPHONE HYDROCHLORIDE 2 MG/ML
1 INJECTION, SOLUTION INTRAMUSCULAR; INTRAVENOUS; SUBCUTANEOUS
Status: DISCONTINUED | OUTPATIENT
Start: 2018-09-24 | End: 2018-09-26 | Stop reason: HOSPADM

## 2018-09-24 RX ORDER — ROPIVACAINE HYDROCHLORIDE 2 MG/ML
INJECTION, SOLUTION EPIDURAL; INFILTRATION; PERINEURAL AS NEEDED
Status: DISCONTINUED | OUTPATIENT
Start: 2018-09-24 | End: 2018-09-24 | Stop reason: HOSPADM

## 2018-09-24 RX ORDER — LIDOCAINE HYDROCHLORIDE 10 MG/ML
0.1 INJECTION INFILTRATION; PERINEURAL AS NEEDED
Status: DISCONTINUED | OUTPATIENT
Start: 2018-09-24 | End: 2018-09-24 | Stop reason: HOSPADM

## 2018-09-24 RX ORDER — SODIUM CHLORIDE 9 MG/ML
100 INJECTION, SOLUTION INTRAVENOUS CONTINUOUS
Status: DISPENSED | OUTPATIENT
Start: 2018-09-24 | End: 2018-09-25

## 2018-09-24 RX ORDER — MIDAZOLAM HYDROCHLORIDE 1 MG/ML
INJECTION, SOLUTION INTRAMUSCULAR; INTRAVENOUS AS NEEDED
Status: DISCONTINUED | OUTPATIENT
Start: 2018-09-24 | End: 2018-09-24 | Stop reason: HOSPADM

## 2018-09-24 RX ORDER — CELECOXIB 200 MG/1
200 CAPSULE ORAL ONCE
Status: COMPLETED | OUTPATIENT
Start: 2018-09-24 | End: 2018-09-24

## 2018-09-24 RX ORDER — ROPINIROLE 1 MG/1
1 TABLET, FILM COATED ORAL
Status: DISCONTINUED | OUTPATIENT
Start: 2018-09-24 | End: 2018-09-26 | Stop reason: HOSPADM

## 2018-09-24 RX ORDER — ASPIRIN 81 MG/1
81 TABLET ORAL EVERY 12 HOURS
Status: DISCONTINUED | OUTPATIENT
Start: 2018-09-24 | End: 2018-09-26 | Stop reason: HOSPADM

## 2018-09-24 RX ORDER — MIDAZOLAM HYDROCHLORIDE 1 MG/ML
2 INJECTION, SOLUTION INTRAMUSCULAR; INTRAVENOUS ONCE
Status: COMPLETED | OUTPATIENT
Start: 2018-09-24 | End: 2018-09-24

## 2018-09-24 RX ORDER — EPHEDRINE SULFATE 50 MG/ML
INJECTION, SOLUTION INTRAVENOUS AS NEEDED
Status: DISCONTINUED | OUTPATIENT
Start: 2018-09-24 | End: 2018-09-24 | Stop reason: HOSPADM

## 2018-09-24 RX ORDER — SODIUM CHLORIDE 0.9 % (FLUSH) 0.9 %
5-10 SYRINGE (ML) INJECTION EVERY 8 HOURS
Status: DISCONTINUED | OUTPATIENT
Start: 2018-09-24 | End: 2018-09-26 | Stop reason: HOSPADM

## 2018-09-24 RX ORDER — ACETAMINOPHEN 10 MG/ML
1000 INJECTION, SOLUTION INTRAVENOUS ONCE
Status: COMPLETED | OUTPATIENT
Start: 2018-09-24 | End: 2018-09-24

## 2018-09-24 RX ORDER — VERAPAMIL HYDROCHLORIDE 120 MG/1
120 TABLET, FILM COATED, EXTENDED RELEASE ORAL DAILY
Status: DISCONTINUED | OUTPATIENT
Start: 2018-09-25 | End: 2018-09-26 | Stop reason: HOSPADM

## 2018-09-24 RX ORDER — ACETAMINOPHEN 500 MG
1000 TABLET ORAL EVERY 6 HOURS
Status: DISCONTINUED | OUTPATIENT
Start: 2018-09-25 | End: 2018-09-26 | Stop reason: HOSPADM

## 2018-09-24 RX ORDER — NALOXONE HYDROCHLORIDE 0.4 MG/ML
0.2 INJECTION, SOLUTION INTRAMUSCULAR; INTRAVENOUS; SUBCUTANEOUS AS NEEDED
Status: DISCONTINUED | OUTPATIENT
Start: 2018-09-24 | End: 2018-09-24 | Stop reason: HOSPADM

## 2018-09-24 RX ORDER — ACETAMINOPHEN 500 MG
1000 TABLET ORAL ONCE
Status: COMPLETED | OUTPATIENT
Start: 2018-09-24 | End: 2018-09-24

## 2018-09-24 RX ORDER — ASPIRIN 81 MG/1
81 TABLET ORAL EVERY 12 HOURS
Qty: 70 TAB | Refills: 0 | Status: SHIPPED | OUTPATIENT
Start: 2018-09-24 | End: 2018-10-29

## 2018-09-24 RX ORDER — NALOXONE HYDROCHLORIDE 0.4 MG/ML
.2-.4 INJECTION, SOLUTION INTRAMUSCULAR; INTRAVENOUS; SUBCUTANEOUS
Status: DISCONTINUED | OUTPATIENT
Start: 2018-09-24 | End: 2018-09-26 | Stop reason: HOSPADM

## 2018-09-24 RX ORDER — SODIUM CHLORIDE 0.9 % (FLUSH) 0.9 %
5-10 SYRINGE (ML) INJECTION AS NEEDED
Status: DISCONTINUED | OUTPATIENT
Start: 2018-09-24 | End: 2018-09-24 | Stop reason: HOSPADM

## 2018-09-24 RX ORDER — CELECOXIB 200 MG/1
200 CAPSULE ORAL EVERY 12 HOURS
Status: DISCONTINUED | OUTPATIENT
Start: 2018-09-24 | End: 2018-09-26 | Stop reason: HOSPADM

## 2018-09-24 RX ADMIN — ACETAMINOPHEN 1000 MG: 10 INJECTION, SOLUTION INTRAVENOUS at 18:11

## 2018-09-24 RX ADMIN — ASPIRIN 81 MG: 81 TABLET, COATED ORAL at 22:25

## 2018-09-24 RX ADMIN — ROPIVACAINE HYDROCHLORIDE 20 ML: 2 INJECTION, SOLUTION EPIDURAL; INFILTRATION; PERINEURAL at 08:11

## 2018-09-24 RX ADMIN — DEXAMETHASONE SODIUM PHOSPHATE 10 MG: 4 INJECTION, SOLUTION INTRA-ARTICULAR; INTRALESIONAL; INTRAMUSCULAR; INTRAVENOUS; SOFT TISSUE at 08:53

## 2018-09-24 RX ADMIN — TUBERCULIN PURIFIED PROTEIN DERIVATIVE 5 UNITS: 5 INJECTION, SOLUTION INTRADERMAL at 06:58

## 2018-09-24 RX ADMIN — CLINDAMYCIN PHOSPHATE 600 MG: 600 INJECTION, SOLUTION INTRAVENOUS at 15:28

## 2018-09-24 RX ADMIN — HYDROMORPHONE HYDROCHLORIDE 2 MG: 2 TABLET ORAL at 18:11

## 2018-09-24 RX ADMIN — HYDROMORPHONE HYDROCHLORIDE 2 MG: 2 TABLET ORAL at 22:25

## 2018-09-24 RX ADMIN — LIDOCAINE HYDROCHLORIDE 0.1 ML: 10 INJECTION, SOLUTION INFILTRATION; PERINEURAL at 06:50

## 2018-09-24 RX ADMIN — Medication 3 AMPULE: at 06:47

## 2018-09-24 RX ADMIN — MIDAZOLAM HYDROCHLORIDE 2 MG: 1 INJECTION, SOLUTION INTRAMUSCULAR; INTRAVENOUS at 08:29

## 2018-09-24 RX ADMIN — SODIUM CHLORIDE, SODIUM LACTATE, POTASSIUM CHLORIDE, AND CALCIUM CHLORIDE: 600; 310; 30; 20 INJECTION, SOLUTION INTRAVENOUS at 09:44

## 2018-09-24 RX ADMIN — VANCOMYCIN HYDROCHLORIDE 1000 MG: 1 INJECTION, POWDER, LYOPHILIZED, FOR SOLUTION INTRAVENOUS at 08:20

## 2018-09-24 RX ADMIN — MIDAZOLAM HYDROCHLORIDE 2 MG: 1 INJECTION, SOLUTION INTRAMUSCULAR; INTRAVENOUS at 08:08

## 2018-09-24 RX ADMIN — CLINDAMYCIN PHOSPHATE 600 MG: 600 INJECTION, SOLUTION INTRAVENOUS at 22:18

## 2018-09-24 RX ADMIN — GENTAMICIN SULFATE 300 MG: 40 INJECTION, SOLUTION INTRAMUSCULAR; INTRAVENOUS at 06:48

## 2018-09-24 RX ADMIN — ACETAMINOPHEN 1000 MG: 500 TABLET, FILM COATED ORAL at 06:47

## 2018-09-24 RX ADMIN — CELECOXIB 200 MG: 200 CAPSULE ORAL at 06:47

## 2018-09-24 RX ADMIN — EPHEDRINE SULFATE 10 MG: 50 INJECTION, SOLUTION INTRAVENOUS at 10:18

## 2018-09-24 RX ADMIN — PROPOFOL 25 MCG/KG/MIN: 10 INJECTION, EMULSION INTRAVENOUS at 08:42

## 2018-09-24 RX ADMIN — PROPOFOL: 10 INJECTION, EMULSION INTRAVENOUS at 10:02

## 2018-09-24 RX ADMIN — FENTANYL CITRATE 50 MCG: 50 INJECTION INTRAMUSCULAR; INTRAVENOUS at 08:08

## 2018-09-24 RX ADMIN — Medication 10 ML: at 15:34

## 2018-09-24 RX ADMIN — Medication 1 AMPULE: at 22:25

## 2018-09-24 RX ADMIN — GABAPENTIN 300 MG: 300 CAPSULE ORAL at 22:25

## 2018-09-24 RX ADMIN — SODIUM CHLORIDE, SODIUM LACTATE, POTASSIUM CHLORIDE, AND CALCIUM CHLORIDE 100 ML/HR: 600; 310; 30; 20 INJECTION, SOLUTION INTRAVENOUS at 06:49

## 2018-09-24 RX ADMIN — ROPINIROLE HYDROCHLORIDE 1 MG: 1 TABLET, FILM COATED ORAL at 22:25

## 2018-09-24 RX ADMIN — CELECOXIB 200 MG: 200 CAPSULE ORAL at 22:24

## 2018-09-24 RX ADMIN — EPHEDRINE SULFATE 10 MG: 50 INJECTION, SOLUTION INTRAVENOUS at 08:57

## 2018-09-24 RX ADMIN — TRANEXAMIC ACID 1000 MG: 100 INJECTION, SOLUTION INTRAVENOUS at 08:38

## 2018-09-24 RX ADMIN — ONDANSETRON 4 MG: 2 INJECTION INTRAMUSCULAR; INTRAVENOUS at 08:54

## 2018-09-24 RX ADMIN — BUPIVACAINE HYDROCHLORIDE 1.6 ML: 7.5 INJECTION, SOLUTION INTRASPINAL at 08:32

## 2018-09-24 RX ADMIN — SODIUM CHLORIDE, SODIUM LACTATE, POTASSIUM CHLORIDE, AND CALCIUM CHLORIDE: 600; 310; 30; 20 INJECTION, SOLUTION INTRAVENOUS at 08:25

## 2018-09-24 NOTE — PROGRESS NOTES
Problem: Mobility Impaired (Adult and Pediatric)  Goal: *Acute Goals and Plan of Care (Insert Text)  GOALS (1-4 days):  (1.)Ms. Shanda Kidd will move from supine to sit and sit to supine  in bed with STAND BY ASSIST.  (2.)Ms. Shanda Kidd will transfer from bed to chair and chair to bed with STAND BY ASSIST using the least restrictive device. (3.)Ms. Shanda Kidd will ambulate with STAND BY ASSIST for 150 feet with the least restrictive device. (4.)Ms. Shanda Kidd will ambulate up/down 2 steps with left railing with MINIMAL ASSIST with device as needed. (5.)Ms. Shanda Kidd will increase right knee ROM to 5°-80°.  ________________________________________________________________________________________________      PHYSICAL THERAPY [facility] tKa: Initial Assessment, PM 9/24/2018  INPATIENT: Hospital Day: 1  [place] / Product Type: Medicare /      NAME/AGE/GENDER: Sabina Wood is a 76 y.o. female   PRIMARY DIAGNOSIS:  Primary osteoarthritis of right knee [M17.11]   Procedure(s) and Anesthesia Type:     * KNEE ARTHROPLASTY TOTAL/ RIGHT/  - Spinal (Right)  ICD-10: Treatment Diagnosis:    · Pain in Right Knee (M25.561)  · Stiffness of Right Knee, Not elsewhere classified (M25.661)  · Difficulty in walking, Not elsewhere classified (R26.2)      ASSESSMENT:     Ms. Shanda Kidd presents with decreased rom and strength of right LE as well as decreased functional mobility and gait s/p right tka. She plans to go home with HHPT. She took some steps to the chair with walker and did tka exercises. This section established at most recent assessment   PROBLEM LIST (Impairments causing functional limitations):  1. Decreased Strength  2. Decreased ADL/Functional Activities  3. Decreased Transfer Abilities  4. Decreased Ambulation Ability/Technique  5. Decreased Balance  6. Increased Pain  7. Decreased Activity Tolerance  8. Decreased Flexibility/Joint Mobility  9.  Decreased [place] with Home Exercise Program   INTERVENTIONS PLANNED: (Benefits and precautions of physical therapy have been discussed with the patient.)  1. Bed Mobility  2. Gait Training  3. Home Exercise Program (HEP)  4. Therapeutic Exercise/Strengthening  5. Transfer Training  6. Range of Motion: active/assisted/passive  7. Therapeutic Activities  8. Group Therapy     TREATMENT PLAN: Frequency/Duration: Follow patient BID for duration of hospital stay to address above goals. Rehabilitation Potential For Stated Goals: Good     RECOMMENDED REHABILITATION/EQUIPMENT: (at time of discharge pending progress): Continue Skilled Therapy and Home Health: Physical Therapy. HISTORY:   History of Present Injury/Illness (Reason for Referral):  S/p right tka  Past Medical History/Comorbidities:   Ms. Eugene Mendez  has a past medical history of Arthritis; Chronic pain; Environmental allergies; Hypertension; Ill-defined condition; Migraine; Psychiatric disorder; RLS (restless legs syndrome); and Rosacea. She also has no past medical history of Adverse effect of anesthesia; Difficult intubation; Malignant hyperthermia due to anesthesia; Nausea & vomiting; or Pseudocholinesterase deficiency. Ms. Eugene Mendez  has a past surgical history that includes pr abdomen surgery proc unlisted; hx appendectomy; and hx dilation and curettage.   Social History/Living Environment:   Home Environment: Private residence  # Steps to Enter: 1  One/Two Story Residence: Two story, live on 1st floor  Living Alone: No  Support Systems: Family member(s)  Patient Expects to be Discharged to[de-identified] Private residence  Current DME Used/Available at Home: None  Tub or Shower Type: Shower  Prior Level of Function/Work/Activity:  independnet   Number of Personal Factors/Comorbidities that affect the Plan of Care: 1-2: MODERATE COMPLEXITY   EXAMINATION:   Most Recent Physical Functioning:      Gross Assessment  AROM: Within functional limits (left LE)  Strength: Generally decreased, functional (left LE)        RLE AROM  R Knee Flexion: 60  R Knee Extension: 10            Bed Mobility  Supine to Sit: Minimum assistance  Sit to Supine: Minimum assistance    Transfers  Sit to Stand: Assist x2;Minimum assistance  Stand to Sit: Assist x2;Minimum assistance  Bed to Chair: Assist x2;Minimum assistance                   Weight Bearing Status  Right Side Weight Bearing: As tolerated  Distance (ft): 3 Feet (ft)  Ambulation - Level of Assistance: Assist x2; Additional time;Minimal assistance  Assistive Device: Walker, rolling  Speed/Tara: Delayed  Stance: Right decreased  Gait Abnormalities: Antalgic;Decreased step clearance  Interventions: Safety awareness training;Verbal cues     Braces/Orthotics:     Right Knee Cold  Type: Cryocuff      Body Structures Involved:  1. Bones  2. Joints  3. Muscles  4. Ligaments Body Functions Affected:  1. Movement Related Activities and Participation Affected:  1. Mobility   Number of elements that affect the Plan of Care: 3: MODERATE COMPLEXITY   CLINICAL PRESENTATION:   Presentation: Stable and uncomplicated: LOW COMPLEXITY   CLINICAL DECISION MAKIN [facility] [address] [id number] AM-PAC 6 Clicks   Basic Mobility Inpatient Short Form  How much difficulty does the patient currently have. .. Unable A Lot A Little None   1. Turning over in bed (including adjusting bedclothes, sheets and blankets)? [] 1   [] 2   [x] 3   [] 4   2. Sitting down on and standing up from a chair with arms ( e.g., wheelchair, bedside commode, etc.)   [] 1   [] 2   [x] 3   [] 4   3. Moving from lying on back to sitting on the side of the bed? [] 1   [] 2   [x] 3   [] 4   How much help from another person does the patient currently need. .. Total A Lot A Little None   4. Moving to and from a bed to a chair (including a wheelchair)? [] 1   [] 2   [x] 3   [] 4   5. Need to walk in hospital room? [] 1   [x] 2   [] 3   [] 4   6.   Climbing 3-5 steps with a railing? [] 1   [x] 2   [] 3   [] 4   © 2007, Trustees of [address] [place] [address] [address] [id number], under license to [employer]. All rights reserved        Score:  Initial: 16 Most Recent: X (Date: -- )    Interpretation of Tool:  Represents activities that are increasingly more difficult (i.e. Bed mobility, Transfers, Gait). Score 24 23 22-20 19-15 14-10 9-7 6     Modifier CH CI CJ CK CL CM CN      ? Mobility - Walking and Moving Around:     - CURRENT STATUS: CK - 40%-59% impaired, limited or restricted    - GOAL STATUS: CJ - 20%-39% impaired, limited or restricted    - D/C STATUS:  ---------------To be determined---------------  [place] / [place] / Product Type: Medicare /      Medical Necessity:     · Patient is expected to demonstrate progress in strength, range of motion and balance to decrease assistance required with theraputic exercises and functional mobility. Reason for Services/Other Comments:  · Patient continues to require present interventions due to patient's inability to perform theraputic exercises and functional mobility independently. Use of outcome tool(s) and clinical judgement create a POC that gives a: Clear prediction of patient's progress: LOW COMPLEXITY            TREATMENT:   (In addition to Assessment/Re-Assessment sessions the following treatments were rendered)     Pre-treatment Symptoms/Complaints:  none  Pain: Initial:      Post Session:  0     Therapeutic Exercise: (10 Minutes):  Exercises per grid below to improve mobility and strength. Required minimal visual, verbal and manual cues to promote proper body alignment, promote proper body posture and promote proper body mechanics. Progressed range and repetitions as indicated.       Date:  9/24 Date:   Date:     ACTIVITY/EXERCISE AM PM AM PM AM PM   GROUP THERAPY  []  []  []  []  []  []   Ankle Pumps  10a       Quad Sets  10a       Gluteal Sets  10a       Hip ABd/ADduction  10a       Straight Leg Raises         Knee Slides  10a       Short Arc Quads         Long Arc Quads         Chair Slides                  B = bilateral; AA = active assistive; A = active; P = passive      Treatment/Session Assessment:     Response to Treatment:  Pt. Did fine, some LE numbness limiting further gait. Education:  [x] Home Exercises  [x] Fall Precautions  [] Hip Precautions [] D/C Instruction Review  [x] Knee/Hip Prosthesis Review  [x] Walker Management/Safety [] Adaptive Equipment as Needed       Interdisciplinary Collaboration:   o Occupational Therapist  o Registered Nurse    After treatment position/precautions:   o Up in chair  o Bed/Chair-wheels locked  o Bed in low position  o Call light within reach  o RN notified    Compliance with Program/Exercises: Will assess as treatment progresses. No questions. Recommendations/Intent for next treatment session:  Treatment next visit will focus on increasing Ms. Werner's independence with bed mobility, transfers, gait training, strength/ROM exercises, modalities for pain, and patient education.       Total Treatment Duration:  PT Patient Time In/Time Out  Time In: 1410  Time Out: 5026 [facility]

## 2018-09-24 NOTE — PHYSICIAN ADVISORY
24-May-2018 08:45 Letter of Determination: Inpatient Status Appropriate    This patient was originally hospitalized as Inpatient Status on 9/24/2018 for scheduled right total knee arthroplasty. This patient is appropriate for Inpatient Admission in accordance with CMS regulation Section 43 .3. Specifically, patient's stay is expected to be more than Two Midnights and was medically necessary. The patient's stay was medically necessary based on age > 72, anxiety and depression, and hypertesion. Consistent with CMS guidelines, patient meets for inpatient status. It is our recommendation that this patient's hospitalization status should be INPATIENT status.      The final decision regarding the patient's hospitalization status depends on the attending physician's judgement.     Frankie Caban MD, MIKE,   Physician Rian Vega. 24-May-2018 08:46

## 2018-09-24 NOTE — PROGRESS NOTES
Problem: Self Care Deficits Care Plan (Adult)  Goal: *Acute Goals and Plan of Care (Insert Text)  GOALS:   DISCHARGE GOALS (in preparation for going home/rehab):  3 days  1. Ms. Erin Whitmore will perform one lower body dressing activity with minimal assistance required to demonstrate improved functional mobility and safety. 2.  Ms. Erin Whitmore will perform one lower body bathing activity with minimal assistance required to demonstrate improved functional mobility and safety. 3.  Ms. Erin Whitmore will perform toileting/toilet transfer with contact guard assistance to demonstrate improved functional mobility and safety. 4.  Ms. Erin Whitmore will perform shower transfer with contact guard assistance to demonstrate improved functional mobility and safety. JOINT CAMP OCCUPATIONAL THERAPY TKA: Initial Assessment and PM 9/24/2018  INPATIENT: Hospital Day: 1  [place] / [place] / Product Type: Medicare /      NAME/AGE/GENDER: Alexy Jaffe is a 76 y.o. female   PRIMARY DIAGNOSIS:  Primary osteoarthritis of right knee [M17.11]   Procedure(s) and Anesthesia Type:     * KNEE ARTHROPLASTY TOTAL/ RIGHT/  - Spinal (Right)  ICD-10: Treatment Diagnosis:    · Pain in Right Knee (M25.561)  · Stiffness of Right Knee, Not elsewhere classified (M25.661)  · Generalized Muscle Weakness (M62.81)  · Other lack of cordination (R27.8)      ASSESSMENT:     Ms. Erin Whitmore is s/p right TKA and presents with decreased weight bearing on right LE and decreased independence with functional mobility and activities of daily living as compared to baseline level of function and safety. Patient would benefit from skilled Occupational Therapy to maximize independence and safety with self-care task and functional mobility.   Pt would also benefit from education on adaptive equipment and safety precautions in preparation for going home or for recommendations for post-hospital rehab program.  Patient plans for further rehab at home with home health services and good family support . OT reviewed therapy schedule and plan of care with patient. Patient was able to transfer and preform self care skills as charted below. Patient instructed to call for assistance when needing to get up from the bed and all needs in reach. Patient verbalized understanding of call light. This section established at most recent assessment   PROBLEM LIST (Impairments causing functional limitations):  1. Decreased Strength  2. Decreased ADL/Functional Activities  3. Decreased Transfer Abilities  4. Increased Pain  5. Increased Fatigue  6. Decreased Flexibility/Joint Mobility  7. Decreased Knowledge of Precautions   INTERVENTIONS PLANNED: (Benefits and precautions of occupational therapy have been discussed with the patient.)  1. Activities of daily living training  2. Adaptive equipment training  3. Balance training  4. Clothing management  5. Donning&doffing training  6. Theraputic activity     TREATMENT PLAN: Frequency/Duration: Follow patient 1-2x to address above goals. Rehabilitation Potential For Stated Goals: Excellent     RECOMMENDED REHABILITATION/EQUIPMENT: (at time of discharge pending progress): Continue Skilled Therapy and Home Health: Physical Therapy. OCCUPATIONAL PROFILE AND HISTORY:   History of Present Injury/Illness (Reason for Referral): Pt presents this date s/p (right) TKA. Past Medical History/Comorbidities:   Ms. Erin Whitmore  has a past medical history of Arthritis; Chronic pain; Environmental allergies; Hypertension; Ill-defined condition; Migraine; Psychiatric disorder; RLS (restless legs syndrome); and Rosacea. She also has no past medical history of Adverse effect of anesthesia; Difficult intubation; Malignant hyperthermia due to anesthesia; Nausea & vomiting; or Pseudocholinesterase deficiency.   Ms. Erin Whitmore  has a past surgical history that includes pr abdomen surgery proc unlisted; hx appendectomy; and hx dilation and curettage. Social History/Living Environment:   Home Environment: Private residence  # Steps to Enter: 1  One/Two Story Residence: Two story, live on 1st floor  Living Alone: No  Support Systems: Family member(s)  Patient Expects to be Discharged to[de-identified] Private residence  Current DME Used/Available at Home: None  Tub or Shower Type: Shower  Prior Level of Function/Work/Activity:  Indep with self care and functional mobility     Number of Personal Factors/Comorbidities that affect the Plan of Care: Brief history (0):  LOW COMPLEXITY   ASSESSMENT OF OCCUPATIONAL PERFORMANCE[de-identified]   Most Recent Physical Functioning:                        Coordination  Fine Motor Skills-Upper: Left Intact; Right Intact  Gross Motor Skills-Upper: Left Intact; Right Intact         Mental Status  Neurologic State: Alert  Orientation Level: Oriented X4  Cognition: Appropriate decision making; Appropriate for age attention/concentration; Appropriate safety awareness; Follows commands  Perception: Appears intact  Perseveration: No perseveration noted                Basic ADLs (From Assessment) Complex ADLs (From Assessment)   Basic ADL  Feeding: Supervision  Oral Facial Hygiene/Grooming: Supervision  Bathing: Moderate assistance  Upper Body Dressing: Minimum assistance  Lower Body Dressing: Maximum assistance  Toileting: Total assistance     Grooming/Bathing/Dressing Activities of Daily Living                       Functional Transfers  Toilet Transfer : Assist x2;Minimum assistance  Shower Transfer: Assist x2;Minimum assistance     Bed/Mat Mobility  Supine to Sit: Minimum assistance  Sit to Supine: Minimum assistance  Sit to Stand: Assist x2;Minimum assistance  Bed to Chair: Assist x2;Minimum assistance         Physical Skills Involved:  1. Range of Motion  2. Balance  3. Strength Cognitive Skills Affected (resulting in the inability to perform in a timely and safe manner):  1. None Psychosocial Skills Affected:  1.  None   Number of elements that affect the Plan of Care: 1-3:  LOW COMPLEXITY   CLINICAL DECISION MAKIN[address] [place] [id number] AM-PAC 6 Clicks   Daily Activity Inpatient Short Form  How much help from another person does the patient currently need. .. Total A Lot A Little None   1. Putting on and taking off regular lower body clothing? [] 1   [x] 2   [] 3   [] 4   2. Bathing (including washing, rinsing, drying)? [] 1   [x] 2   [] 3   [] 4   3. Toileting, which includes using toilet, bedpan or urinal?   [] 1   [x] 2   [] 3   [] 4   4. Putting on and taking off regular upper body clothing? [] 1   [] 2   [x] 3   [] 4   5. Taking care of personal grooming such as brushing teeth? [] 1   [] 2   [x] 3   [] 4   6. Eating meals? [] 1   [] 2   [] 3   [x] 4   © , Trustees of [address] [place] [id number], under license to [employer]. All rights reserved     Score:  Initial: 16 Most Recent: X (Date: -- )    Interpretation of Tool:  Represents activities that are increasingly more difficult (i.e. Bed mobility, Transfers, Gait). Score 24 23 22-20 19-15 14-10 9-7 6     Modifier CH CI CJ CK CL CM CN      ? Self Care:     - CURRENT STATUS: CK - 40%-59% impaired, limited or restricted    - GOAL STATUS: CJ - 20%-39% impaired, limited or restricted    - D/C STATUS:  ---------------To be determined---------------  [place] / [place] / Product Type: Medicare /      Medical Necessity:     · Patient is expected to demonstrate progress in balance, coordination and functional technique to decrease assistance required with self care and functional mobility. Reason for Services/Other Comments:  · Patient continues to require skilled intervention due to decreased self care and functional mobility.    Use of outcome tool(s) and clinical judgement create a POC that gives a: LOW COMPLEXITY            TREATMENT:   (In addition to Assessment/Re-Assessment sessions the following treatments were rendered) Pre-treatment Symptoms/Complaints:  None  Pain: Initial:   Pain Intensity 1: 0  Post Session:  0     Assessment/Reassessment only, no treatment provided today    Treatment/Session Assessment:     Response to Treatment:  Tolerated well. Education:  [] Home Exercises  [x] Fall Precautions  [] Hip Precautions [] Going Home Video  [x] Knee/Hip Prosthesis Review  [x] Walker Management/Safety [x] Adaptive Equipment as Needed       Interdisciplinary Collaboration:   o Physical Therapist  o Occupational Therapist  o Registered Nurse    After treatment position/precautions:   o Up in chair  o Bed/Chair-wheels locked  o Caregiver at bedside  o Call light within reach  o RN notified  o Family at bedside     Compliance with Program/Exercises: compliant all of the time. Recommendations/Intent for next treatment session:  Treatment next visit will focus on increasing Ms. Werner's independence with bed mobility, transfers, self care, functional mobility, modalities for pain, and patient education.       Total Treatment Duration:  OT Patient Time In/Time Out  Time In: 1400  Time Out: [address] [place], [place]

## 2018-09-24 NOTE — H&P
The patient has end stage arthritis of the right knee. The patient was see and examined and there are no changes to the patient's orthopedic condition. They have tried conservative treatment for this condition; including antiinflammatories and lifestyle modifications and have failed. The necessity for the joint replacement is still present, and the H&P from the office is still current.  The patient will be admitted today for Procedure(s) (LRB):  KNEE ARTHROPLASTY TOTAL/ RIGHT/ MARCI/ FNB (Right)

## 2018-09-24 NOTE — ANESTHESIA PROCEDURE NOTES
Peripheral Block Start time: 9/24/2018 8:08 AM 
End time: 9/24/2018 8:11 AM 
Performed by: Carlota Capellan Authorized by: Carlota Capellan Pre-procedure: Indications: at surgeon's request and post-op pain management Preanesthetic Checklist: patient identified, risks and benefits discussed, site marked, timeout performed, anesthesia consent given and patient being monitored Timeout Time: 08:08 Block Type:  
Block Type: Adductor canal 
Laterality:  Right Monitoring:  Standard ASA monitoring, responsive to questions, continuous pulse ox, oxygen, frequent vital sign checks and heart rate Injection Technique:  Single shot Procedures: ultrasound guided Patient Position: supine Prep: chlorhexidine Location:  Mid thigh Needle Type:  Stimuplex Needle Gauge:  22 G Needle Localization:  Ultrasound guidance Medication Injected:  0.2% 
ropivacaine Volume (mL):  20 
dexamethasone Volume (mL):  0.5 Assessment: 
Number of attempts:  1 Injection Assessment:  Incremental injection every 5 mL, negative aspiration for CSF, ultrasound image on chart, no paresthesia, local visualized surrounding nerve on ultrasound, negative aspiration for blood and no intravascular symptoms Patient tolerance:  Patient tolerated the procedure well with no immediate complications

## 2018-09-24 NOTE — ANESTHESIA PROCEDURE NOTES
Spinal Block Start time: 9/24/2018 8:29 AM 
End time: 9/24/2018 8:33 AM 
Performed by: Darryn De La Fuente Authorized by: Darryn De La Fuente Pre-procedure: Indications: at surgeon's request and primary anesthetic  Preanesthetic Checklist: patient identified, risks and benefits discussed, anesthesia consent, site marked, patient being monitored and timeout performed Timeout Time: 08:29 Spinal Block:  
Patient Position:  Seated Prep Region:  Lumbar Prep: chlorhexidine and patient draped Location:  L3-4 Technique:  Single shot Local:  Lidocaine 1% Local Dose (mL):  3 Needle:  
Needle Type:  Pencan Needle Gauge:  25 G Attempts:  1 Events: CSF confirmed, no blood with aspiration and no paresthesia Assessment: 
Insertion:  Uncomplicated Patient tolerance:  Patient tolerated the procedure well with no immediate complications

## 2018-09-24 NOTE — IP AVS SNAPSHOT
[address] 
 
 
 [address] [facility],[address] [facility] 
[phone] Patient: Phill Anderson MRN: [id number] ADN:[id number] About your hospitalization You were admitted on:  September 24, 2018 You last received care in the:  [facility] You were discharged on:  September 26, 2018 Why you were hospitalized Your primary diagnosis was:  Status Post Right Knee Replacement Your diagnoses also included:  Osteoarthritis Of Right Knee Follow-up Information Follow up With Details Comments Contact Info Antwon Hodge MD  As needed [address] [address] [phone] Kassidy Parker MD  As scheduled by office [place] [address] 
[phone] [facility]   Will contact you within 48 hrs [phone] Discharge Orders Procedure Order Date Status Priority Quantity Spec Type Associated Dx CALL YOUR DOCTOR For: Temperature greater than 100.4., Severe uncontrolled pain. , Persistant nausea and vomiting., Persistant dizziness or light-headedness. , Hives, Difficulty breathing, headache, or visual disturbances. , Redness, tenderness, or s. .. 09/24/18 1330 Normal Routine 1  Status post right knee replacement [[id number]] Questions: For:  Temperature greater than 100.4. For:  Severe uncontrolled pain. For:  Persistant nausea and vomiting. For:  Persistant dizziness or light-headedness. For:  Marysol Taylor For:  Difficulty breathing, headache, or visual disturbances. For:  Redness, tenderness, or signs of infection. ACTIVITY AFTER DISCHARGE Patient should: Restrict driving, Restrict lifting, Other (specify) 09/24/18 1330 Normal Routine 1  Status post right knee replacement [[id number]] Questions: Patient should:  Restrict driving Patient should:  Restrict lifting Patient should: Other (specify) DRESSING, CHANGE SPECIFY 09/24/18 1330 Normal Routine 1  Status post right knee replacement [[id number]] Comments:  Routine dressing changes. Notify if excessive drainage. If staples are present they are to be removed 10 days post surgery and steri strips applied. REFERRAL TO HOME HEALTH 09/24/18 1330 Normal Routine 1  Status post right knee replacement [[id number]] REFERRAL TO PHYSICAL THERAPY 09/24/18 1330 Normal Routine 1  Status post right knee replacement [[id number]] Comments:  Referral to Home PT A check dayan indicates which time of day the medication should be taken. My Medications START taking these medications Instructions Each Dose to Equal  
 Morning Noon Evening Bedtime HYDROmorphone 2 mg tablet Commonly known as:  DILAUDID Your next dose is: Today @ 12:50 pm, if needed Take 1 Tab by mouth every four (4) hours as needed. Max Daily Amount: 12 mg.  
 2 mg CHANGE how you take these medications Instructions Each Dose to Equal  
 Morning Noon Evening Bedtime  
 aspirin delayed-release 81 mg tablet What changed:  when to take this Your next dose is: Today Take 1 Tab by mouth every twelve (12) hours every twelve (12) hours for 35 days. 81 mg CONTINUE taking these medications Instructions Each Dose to Equal  
 Morning Noon Evening Bedtime  
 cetirizine 10 mg tablet Commonly known as:  ZYRTEC Your next dose is: Today Take  by mouth nightly. FLUoxetine 10 mg capsule Commonly known as:  PROzac Your next dose is:  Tomorrow Take 10 mg by mouth daily. 10 mg  
    
  
   
   
   
  
 fluticasone 50 mcg/actuation nasal spray Commonly known as:  Temple Gu Your next dose is:  Tomorrow 2 Sprays by Both Nostrils route as needed for Rhinitis. 2 Spray  
    
  
   
   
   
  
 gabapentin 300 mg capsule Commonly known as:  NEURONTIN Your next dose is: Today Take 300 mg by mouth nightly. 300 mg  
    
   
   
   
  
  
 metroNIDAZOLE 0.75 % topical cream  
Commonly known as:  METROCREAM  
Your next dose is: Today Apply  to affected area nightly. Use a thin layer to affected areas after washing  
     
   
   
   
  
  
 rOPINIRole 1 mg tablet Commonly known as:  Stana Mc Your next dose is: Today Take 1 mg by mouth nightly. Indications: Restless Legs Syndrome 1 mg  
    
   
   
   
  
  
 verapamil  mg tablet Commonly known as:  CALAN-SR Your next dose is:  Tomorrow Take 120 mg by mouth daily. 120 mg  
    
  
   
   
   
  
  
STOP taking these medications ALEVE 220 mg tablet Generic drug:  naproxen sodium  
   
  
 codeine-butalbital-acetaminophen-caffeine -19-30 mg capsule Commonly known as:  FIORICET WITH CODEINE Where to Get Your Medications Information on where to get these meds will be given to you by the nurse or doctor. ! Ask your nurse or doctor about these medications  
  aspirin delayed-release 81 mg tablet HYDROmorphone 2 mg tablet Opioid Education Prescription Opioids: What You Need to Know: 
 
Prescription opioids can be used to help relieve moderate-to-severe pain and are often prescribed following a surgery or injury, or for certain health conditions. These medications can be an important part of treatment but also come with serious risks. Opioids are strong pain medicines. Examples include hydrocodone, oxycodone, fentanyl, and morphine. Heroin is an example of an illegal opioid. It is important to work with your health care provider to make sure you are getting the safest, most effective care. WHAT ARE THE RISKS AND SIDE EFFECTS OF OPIOID USE?  
Prescription opioids carry serious risks of addiction and overdose, especially with prolonged use. An opioid overdose, often marked by slow breathing, can cause sudden death. The use of prescription opioids can have a number of side effects as well, even when taken as directed. · Tolerance-meaning you might need to take more of a medication for the same pain relief · Physical dependence-meaning you have symptoms of withdrawal when the medication is stopped. Withdrawal symptoms can include nausea, sweating, chills, diarrhea, stomach cramps, and muscle aches. Withdrawal can last up to several weeks, depending on which drug you took and how long you took it. · Increased sensitivity to pain · Constipation · Nausea, vomiting, and dry mouth · Sleepiness and dizziness · Confusion · Depression · Low levels of testosterone that can result in lower sex drive, energy, and strength · Itching and sweating RISKS ARE GREATER WITH:      
· History of drug misuse, substance use disorder, or overdose · Mental health conditions (such as depression or anxiety) · Sleep apnea · Older age (72 years or older) · Pregnancy Avoid alcohol while taking prescription opioids. Also, unless specifically advised by your health care provider, medications to avoid include: · Benzodiazepines (such as Xanax or Valium) · Muscle relaxants (such as Soma or Flexeril) · Hypnotics (such as Ambien or Lunesta) · Other prescription opioids KNOW YOUR OPTIONS Talk to your health care provider about ways to manage your pain that don't involve prescription opioids. Some of these options may actually work better and have fewer risks and side effects. Consult your physician before adding or stopping any medications, treatments, or physical activity. Options may include: 
· Pain relievers such as acetaminophen, ibuprofen, and naproxen · Some medications that are also used for depression or seizures · Physical therapy and exercise · Counseling to help patients learn how to cope better with triggers of pain and stress. · Application of heat or cold compress · Massage therapy · Relaxation techniques Be Informed Make sure you know the name of your medication, how much and how often to take it, and its potential risks & side effects. IF YOU ARE PRESCRIBED OPIOIDS FOR PAIN: 
· Never take opioids in greater amounts or more often than prescribed. Remember the goal is not to be pain-free but to manage your pain at a tolerable level. · Follow up with your primary care provider to: · Work together to create a plan on how to manage your pain. · Talk about ways to help manage your pain that don't involve prescription opioids. · Talk about any and all concerns and side effects. · Help prevent misuse and abuse. · Never sell or share prescription opioids · Help prevent misuse and abuse. · Store prescription opioids in a secure place and out of reach of others (this may include visitors, children, friends, and family). · Safely dispose of unused/unwanted prescription opioids: Find your community drug take-back program or your pharmacy mail-back program, or flush them down the toilet, following guidance from the Food and Drug Administration (www.fda.gov/Drugs/ResourcesForYou). · Visit www.cdc.gov/drugoverdose to learn about the risks of opioid abuse and overdose. · If you believe you may be struggling with addiction, tell your health care provider and ask for guidance or call [address] [place] [address] at [phone]. Discharge Instructions [employer] Patient Discharge Instructions Shobha Page / [id number] : 1950 Admitted 2018 Discharged: 2018 IF YOU HAVE ANY PROBLEMS ONCE YOU ARE AT HOME CALL THE FOLLOWING NUMBERS:  
Main office number: [phone] Medications · The medications you are to continue on are listed on the medication reconciliation sheet. · Narcotic pain medications as well as supplemental iron can cause constipation. If this occurs try stopping the narcotic pain medication and/or the iron. · It is important that you take the medication exactly as they are prescribed. · Medications which increase your risk of blood clots are listed to stop for 5 weeks after surgery as well as medications or supplements which increase your risk of bleeding complications. · Keep your medication in the bottles provided by the pharmacist and keep a list of the medication names, dosages, and times to be taken in your wallet. · Do not take other medications without consulting your doctor. Important Information Do NOT smoke as this will greatly increase your risk of infection! Resume your prehospital diet. If you have excessive nausea or vomitting call your doctor's office Leg swelling and warmth is normal for 6 months after surgery. If you experience swelling in your leg elevate you leg while laying down with your toes above your heart. If you have sudden onset severe swelling with leg pain call our office. The stitches deep inside take approximately 6 months to dissolve. There will be sharp shooting, stinging and burning pain. This is normal and will resolve between 3-6 months after surgery. Difficulty sleeping is normal following total Knee and Hip replacement. You may try melatonin, an over-the-counter sleep aid or benadryl to help with sleep. Most patients will resume sleeping through the night 8 weeks after surgery. Home Physical Therapy is arranged. Home Health will contact you within 48 hrs of discharge that you have chosen. If you have not received a call within this time frame please contact that provider you chose.  You should be given this information before you leave the hospital.  
 
 You are at a risk for falls. Use the rolling walker when walking. Patients who have had a joint replacement should not drive if they are still taking narcotic pain mediation during the daytime hours. Most patients wean themselves off of pain medication within 2-5 weeks after surgery. When to Call the office - If you have a temperature greater then 101 
- Uncontrolled vomiting - Loose control of your bladder or bowel function - Are unable to bear any weight  
- Need a pain medication refill DISCHARGE SUMMARY from Nurse The following personal items collected during your admission are returned to you:  
Dental Appliance: Dental Appliances: None Vision: Visual Aid: Glasses, With patient Hearing Aid:   na 
Jewelry: Jewelry: None Clothing:   self Other Valuables: Other Valuables: Cell Phone (with App[employer]) Valuables sent to safe:   na 
 
PATIENT INSTRUCTIONS: 
 
After general anesthesia or intravenous sedation, for 24 hours or while taking prescription Narcotics: · Limit your activities · Do not drive and operate hazardous machinery · Do not make important personal or business decisions · Do  not drink alcoholic beverages · If you have not urinated within 8 hours after discharge, please contact your surgeon on call. Report the following to your surgeon: 
· Excessive pain, swelling, redness or odor of or around the surgical area · Temperature over 101 · Nausea and vomiting lasting longer than 4 hours or if unable to take medications · Any signs of decreased circulation or nerve impairment to extremity: change in color, persistent  numbness, tingling, coldness or increase pain · Any questions, call office @ [phone] Keep scheduled follow up appointment. If need to change, call office @ [phone]. *  Please give a list of your current medications to your Primary Care Provider.  
 
*  Please update this list whenever your medications are discontinued, doses are 
 changed, or new medications (including over-the-counter products) are added. *  Please carry medication information at all times in case of emergency situations. Total Knee Replacement: What to Expect at Home Your Recovery When you leave the hospital, you should be able to move around with a walker or crutches. But you will need someone to help you at home for the next few weeks or until you have more energy and can move around better. If there is no one to help you at home, you may go to a rehabilitation center. You will go home with a bandage and stitches, staples, tissue glue, or tape strips. Change the bandage as your doctor tells you to. If you have stitches or staples, your doctor will remove them 10 to 21 days after your surgery. Glue or tape strips will fall off on their own over time. You may still have some mild pain, and the area may be swollen for 3 to 6 months after surgery. Your knee will continue to improve for 6 to 12 months. You will probably use a walker for 1 to 3 weeks and then use crutches. When you are ready, you can use a cane. You will probably be able to walk on your own in 4 to 8 weeks. You will need to do months of physical rehabilitation (rehab) after a knee replacement. Rehab will help you strengthen the muscles of the knee and help you regain movement. After you recover, your artificial knee will allow you to do normal daily activities with less pain or no pain at all. You may be able to hike, dance, ride a bike, and play golf. Talk to your doctor about whether you can do more strenuous activities. Always tell your caregivers that you have an artificial knee. How long it will take to walk on your own, return to normal activities, and go back to work depends on your health and how well your rehabilitation (rehab) program goes. The better you do with your rehab exercises, the quicker you will get your strength and movement back. This care sheet gives you a general idea about how long it will take for you to recover. But each person recovers at a different pace. Follow the steps below to get better as quickly as possible. How can you care for yourself at home? Activity 
  · Rest when you feel tired. You may take a nap, but do not stay in bed all day. When you sit, use a chair with arms. You can use the arms to help you stand up.  
  · Work with your physical therapist to find the best way to exercise. You may be able to take frequent, short walks using crutches or a walker. What you can do as your knee heals will depend on whether your new knee is cemented or uncemented. You may not be able to do certain things for a while if your new knee is uncemented.  
  · After your knee has healed enough, you can do more strenuous activities with caution. ¨ You can golf, but use a golf cart, and do not wear shoes with spikes. ¨ You can bike on a flat road or on a stationary bike. Avoid biking up hills. ¨ Your doctor may suggest that you stay away from activities that put stress on your knee. These include tennis or badminton, squash or racquetball, contact sports like football, jumping (such as in basketball), jogging, or running. ¨ Avoid activities where you might fall. These include horseback riding, skiing, and mountain biking.  
  · Do not sit for more than 1 hour at a time. Get up and walk around for a while before you sit again. If you must sit for a long time, prop up your leg with a chair or footstool. This will help you avoid swelling.  
  · Ask your doctor when you can drive again. It may take up to 8 weeks after knee replacement surgery before it is safe for you to drive.  
  · When you get into a car, sit on the edge of the seat. Then pull in your legs, and turn to face the front.  
  · You should be able to do many everyday activities 3 to 6 weeks after your surgery. You will probably need to take 4 to 16 weeks off from work. When you can go back to work depends on the type of work you do and how you feel.  
  · Ask your doctor when it is okay for you to have sex.  
  · Do not lift anything heavier than 10 pounds and do not lift weights for 12 weeks. Diet 
  · By the time you leave the hospital, you should be eating your normal diet. If your stomach is upset, try bland, low-fat foods like plain rice, broiled chicken, toast, and yogurt. Your doctor may suggest that you take iron and vitamin supplements.  
  · Drink plenty of fluids (unless your doctor tells you not to).   · Eat healthy foods, and watch your portion sizes. Try to stay at your ideal weight. Too much weight puts more stress on your new knee.  
  · You may notice that your bowel movements are not regular right after your surgery. This is common. Try to avoid constipation and straining with bowel movements. You may want to take a fiber supplement every day. If you have not had a bowel movement after a couple of days, ask your doctor about taking a mild laxative. Medicines 
  · Your doctor will tell you if and when you can restart your medicines. He or she will also give you instructions about taking any new medicines.  
  · If you take blood thinners, such as warfarin (Coumadin), clopidogrel (Plavix), or aspirin, be sure to talk to your doctor. He or she will tell you if and when to start taking those medicines again. Make sure that you understand exactly what your doctor wants you to do.  
  · Your doctor may give you a blood-thinning medicine to prevent blood clots. If you take a blood thinner, be sure you get instructions about how to take your medicine safely. Blood thinners can cause serious bleeding problems. This medicine could be in pill form or as a shot (injection). If a shot is necessary, your doctor will tell you how to do this.  
  · Be safe with medicines. Take pain medicines exactly as directed. ¨ If the doctor gave you a prescription medicine for pain, take it as prescribed. ¨ If you are not taking a prescription pain medicine, ask your doctor if you can take an over-the-counter medicine. ¨ Plan to take your pain medicine 30 minutes before exercises. It is easier to prevent pain before it starts than to stop it once it has started.  
  · If you think your pain medicine is making you sick to your stomach: 
¨ Take your medicine after meals (unless your doctor has told you not to). ¨ Ask your doctor for a different pain medicine.  
  · If your doctor prescribed antibiotics, take them as directed. Do not stop taking them just because you feel better. You need to take the full course of antibiotics. Incision care 
  · If your doctor told you how to care for your cut (incision), follow your doctor's instructions. You will have a dressing over the cut. A dressing helps the incision heal and protects it. Your doctor will tell you how to take care of this.  
  · If you did not get instructions, follow this general advice: ¨ If you have strips of tape on the cut the doctor made, leave the tape on for a week or until it falls off. ¨ If you have stitches or staples, your doctor will tell you when to come back to have them removed. ¨ If you have skin adhesive on the cut, leave it on until it falls off. Skin adhesive is also called glue or liquid stitches. ¨ Change the bandage every day. ¨ Wash the area daily with warm water, and pat it dry. Don't use hydrogen peroxide or alcohol. They can slow healing. ¨ You may cover the area with a gauze bandage if it oozes fluid or rubs against clothing. ¨ You may shower 24 to 48 hours after surgery. Pat the incision dry. Don't swim or take a bath for the first 2 weeks, or until your doctor tells you it is okay. Exercise 
  · Your rehab program will give you a number of exercises to do to help you get back your knee's range of motion and strength.  Always do them as your therapist tells you. Ice and elevation 
  · For pain and swelling, put ice or a cold pack on the area for 10 to 20 minutes at a time. Put a thin cloth between the ice and your skin. Other instructions 
  · Continue to wear your support stockings as your doctor says. These help to prevent blood clots. The length of time that you will have to wear them depends on your activity level and the amount of swelling.  
  · You have metal pieces in your knee. These may set off some airport metal detectors. Carry a medical alert card that says you have an artificial joint, just in case. Follow-up care is a key part of your treatment and safety. Be sure to make and go to all appointments, and call your doctor if you are having problems. It's also a good idea to know your test results and keep a list of the medicines you take. When should you call for help? Call 911 anytime you think you may need emergency care. For example, call if: 
  · You passed out (lost consciousness).  
  · You have severe trouble breathing.  
  · You have sudden chest pain and shortness of breath, or you cough up blood.  
 Call your doctor now or seek immediate medical care if: 
  · You have signs of infection, such as: 
¨ Increased pain, swelling, warmth, or redness. ¨ Red streaks leading from the incision. ¨ Pus draining from the incision. ¨ A fever.  
  · You have signs of a blood clot, such as: 
¨ Pain in your calf, back of the knee, thigh, or groin. ¨ Redness and swelling in your leg or groin.  
  · Your incision comes open and begins to bleed, or the bleeding increases.  
  · You have pain that does not get better after you take pain medicine.  
 Watch closely for changes in your health, and be sure to contact your doctor if: 
  · You do not have a bowel movement after taking a laxative. Where can you learn more? Go to http://velia-johana.info/. Enter G306 in the search box to learn more about \"Total Knee Replacement: What to Expect at Home. \" Current as of: November 29, 2017 Content Version: 11.7 © 8015-7517 [employer]. Care instructions adapted under license by [employer] (which disclaims liability or warranty for this information). If you have questions about a medical condition or this instruction, always ask your healthcare professional. [place] [address] any warranty or liability for your use of this information. These are general instructions for a healthy lifestyle: No smoking/ No tobacco products/ Avoid exposure to second hand smoke Surgeon General's Warning:  Quitting smoking now greatly reduces serious risk to your health. Obesity, smoking, and sedentary lifestyle greatly increases your risk for illness A healthy diet, regular physical exercise & weight monitoring are important for maintaining a healthy lifestyle You may be retaining fluid if you have a history of heart failure or if you experience any of the following symptoms:  Weight gain of 3 pounds or more overnight or 5 pounds in a week, increased swelling in our hands or feet or shortness of breath while lying flat in bed. Please call your doctor as soon as you notice any of these symptoms; do not wait until your next office visit. Recognize signs and symptoms of STROKE: 
 
F-face looks uneven A-arms unable to move or move even S-speech slurred or non-existent T-time-call 911 as soon as signs and symptoms begin-DO NOT go Back to bed or wait to see if you get better-TIME IS BRAIN. The discharge information has been reviewed with the patient. The patient verbalized understanding. Information obtained by : 
I understand that if any problems occur once I am at home I am to contact my physician. I understand and acknowledge receipt of the instructions indicated above. Physician's or R.N.'s Signature                                                                  Date/Time Patient or Representative Signature                                                          Date/Time Introducing [facility]! Shelia Mackey introduces [employer] patient portal. Now you can access parts of your medical record, email your doctor's office, and request medication refills online. 1. In your internet browser, go to [email or web address] 2. Click on the First Time User? Click Here link in the Sign In box. You will see the New Member Sign Up page. 3. Enter your [employer] Access Code exactly as it appears below. You will not need to use this code after youve completed the sign-up process. If you do not sign up before the expiration date, you must request a new code. · [employer] Access Code: [email or web address] Expires: 10/15/2018  3:45 PM 
 
4. Enter the last four digits of your Social Security Number (xxxx) and Date of Birth (mm/dd/yyyy) as indicated and click Submit. You will be taken to the next sign-up page. 5. Create a [employer] ID. This will be your [employer] login ID and cannot be changed, so think of one that is secure and easy to remember. 6. Create a [employer] password. You can change your password at any time. 7. Enter your Password Reset Question and Answer. This can be used at a later time if you forget your password. 8. Enter your e-mail address. You will receive e-mail notification when new information is available in [address] 9. Click Sign Up. You can now view and download portions of your medical record.  
10. Click the Download Summary menu link to download a portable copy of your medical information. If you have questions, please visit the Frequently Asked Questions section of the [employer] website. Remember, [employer] is NOT to be used for urgent needs. For medical emergencies, dial 911. Now available from your iPhone and Android! Introducing Jacques Moeller As a [employer] patient, I wanted to make you aware of our electronic visit tool called Jacques Moeller. [employer] 24/7 allows you to connect within minutes with a medical provider 24 hours a day, seven days a week via a mobile device or tablet or logging into a secure website from your computer. You can access Jacques Moeller from anywhere in the [place]. A virtual visit might be right for you when you have a simple condition and feel like you just dont want to get out of bed, or cant get away from work for an appointment, when your regular [employer] provider is not available (evenings, weekends or holidays), or when youre out of town and need minor care. Electronic visits cost only $49 and if the [employer] 24/7 provider determines a prescription is needed to treat your condition, one can be electronically transmitted to a nearby pharmacy*. Please take a moment to enroll today if you have not already done so. The enrollment process is free and takes just a few minutes. To enroll, please download the [employer] 24/7 dedra to your tablet or phone, or visit [email or web address] to enroll on your computer. And, as an [address] [place] [address] patient with a [employer] account, the results of your visits will be scanned into your electronic medical record and your primary care provider will be able to view the scanned results. We urge you to continue to see your regular [employer] provider for your ongoing medical care.   And while your primary care provider may not be the one available when you seek a Jacques Moeller virtual visit, the peace of mind you get from getting a real diagnosis real time can be priceless. For more information on Jacques Moeller, view our Frequently Asked Questions (FAQs) at [email or web address]. Sincerely, 
 
Valentino Milks, MD 
Chief Medical Officer [employer] *:  certain medications cannot be prescribed via Jacques Moeller Providers Seen During Your Hospitalization Provider Specialty Primary office phone Charan Maciel MD Orthopedic Surgery [phone] Immunizations Administered for This Admission Name Date  
 TB Skin Test (PPD) Intradermal  Deferred (), 9/24/2018 Your Primary Care Physician (PCP) Primary Care Physician Office Phone Office Fax Virgilio Cruz [phone] You are allergic to the following Allergen Reactions Pcn (Penicillins) Angioedema Recent Documentation Height Weight Breastfeeding? BMI OB Status Smoking Status 1.626 m 68 kg No 25.73 kg/m2 Postmenopausal Never Smoker Emergency Contacts Name Discharge Info Relation Home Work Mobile Erik Werner  Spouse [3] [phone] Patient Belongings The following personal items are in your possession at time of discharge: 
  Dental Appliances: None  Visual Aid: Glasses      Home Medications: None   Jewelry: None       Other Valuables: Cell Phone (with Oris Tomeka) Please provide this summary of care documentation to your next provider. Signatures-by signing, you are acknowledging that this After Visit Summary has been reviewed with you and you have received a copy. Patient Signature:  ____________________________________________________________ Date:  ____________________________________________________________  
  
Niya Hodges Provider Signature:  ____________________________________________________________ Date:  ____________________________________________________________

## 2018-09-24 NOTE — ANESTHESIA PREPROCEDURE EVALUATION
Anesthetic History No history of anesthetic complications Review of Systems / Medical History Patient summary reviewed and pertinent labs reviewed Pulmonary Within defined limits Neuro/Psych Headaches and psychiatric history (Anxiety/Depression) Cardiovascular Hypertension: well controlled Exercise tolerance: >4 METS Comments: Nml [facility] 2016 Denies CP, SOB or changes in functional status GI/Hepatic/Renal 
Within defined limits Endo/Other Arthritis Other Findings Physical Exam 
 
Airway Mallampati: II 
TM Distance: 4 - 6 cm Neck ROM: normal range of motion Mouth opening: Normal 
 
 Cardiovascular Rhythm: regular Rate: normal 
 
 
 
 Dental 
No notable dental hx Pulmonary Breath sounds clear to auscultation Abdominal 
GI exam deferred Other Findings Anesthetic Plan ASA: 2 Anesthesia type: spinal 
 
 
Post-op pain plan if not by surgeon: peripheral nerve block single Anesthetic plan and risks discussed with: Patient

## 2018-09-24 NOTE — PROGRESS NOTES
Care Management Interventions  Mode of Transport at Discharge: Self  Transition of Care Consult (CM Consult): Discharge Planning  Physical Therapy Consult: Yes  Occupational Therapy Consult: Yes  Current Support Network: Lives with Spouse  Confirm Follow Up Transport: Family  Plan discussed with Pt/Family/Caregiver: Yes  Freedom of Choice Offered: Yes  Discharge Location  Discharge Placement: Home with outpatient services    Patient is a 76y.o. year old female admitted for Right TKA . Patient lives with Her spouse and plans to return home on discharge. Order received to arrange home therapy. Patient without preference towards agency. Referral sent to [facility] which was arrange by MD office. Patient requesting we arrange a walker and bedside commode. Referral sent to [facility] who will deliver to the hospital room prior to discharge. Will follow until discharge.

## 2018-09-24 NOTE — PROGRESS NOTES
09/24/18 1342   Oxygen Therapy   O2 Sat (%) 98 %   Pulse via Oximetry 60 beats per minute   O2 Device Room air   O2 Flow Rate (L/min) 0 l/min   Incentive Spirometry Treatment   Actual Volume (ml) 1500 ml   Number of Attempts Greater than 3   Patient working well with IS and no SOB or pain noted.

## 2018-09-24 NOTE — PROGRESS NOTES
Assessment completed. Pt alert and oriented X4. BLE are pharmaceutically paralyzed. Pedal pulses are palpable. No signs of distress noted. Oriented pt to room, unit, dining on call, IS, and pain management. Pt on room air. Lungs clear to auscultation. Bowel sounds present. Hopkins to drainage with no loops in tubing, and stat lock in place. Yellow/straw color urine noted. IV intact with no redness, or swelling noted infusing. Incision to right knee is covered with Aquacel dressing. Ice applied and plexi-pulses on. Pt denies pain. Call light within reach and bed in low position and locked. Pt informed to call out for needs verbalizes understanding. Daughter and spouse at bedside.

## 2018-09-24 NOTE — PERIOP NOTES
TRANSFER - OUT REPORT:    Verbal report given to [address] on Donn Rosas  being transferred to [facility] for routine progression of care       Report consisted of patients Situation, Background, Assessment and   Recommendations(SBAR). Information from the following report(s) SBAR, MAR and Recent Results was reviewed with the receiving nurse. Lines:   Peripheral IV 28/36/59 Left Cephalic (Active)   Site Assessment Clean, dry, & intact 9/24/2018  6:34 AM   Phlebitis Assessment 0 9/24/2018  6:34 AM   Dressing Status Clean, dry, & intact 9/24/2018  6:34 AM   Dressing Type Transparent;Tape 9/24/2018  6:34 AM   Hub Color/Line Status Pink; Infusing 9/24/2018  6:34 AM   Action Taken Blood drawn 9/24/2018  6:34 AM        Opportunity for questions and clarification was provided.       Patient transported with:   [employer]

## 2018-09-24 NOTE — PERIOP NOTES
TRANSFER - IN REPORT:    Verbal report received from [facility]) on Georgia Conley  being received from [facility] [place](unit) for routine progression of care      Report consisted of patients Situation, Background, Assessment and   Recommendations(SBAR). Information from the following report(s) SBAR, Kardex and MAR was reviewed with the receiving nurse. Opportunity for questions and clarification was provided.

## 2018-09-24 NOTE — PERIOP NOTES
Betadine lavage: 17.5cc of betadine lot # [id number] , exp. Date [id number]  ,  in 1cc of . 9NS Lot #[id number]  , exp.  Date : [id number]

## 2018-09-24 NOTE — ANESTHESIA POSTPROCEDURE EVALUATION
Post-Anesthesia Evaluation and Assessment Patient: Sabina Wood MRN: [id number]  SSN: [id number] YOB: 1950  Age: 76 y.o. Sex: female Cardiovascular Function/Vital Signs Visit Vitals  /74 (BP 1 Location: Right arm, BP Patient Position: At rest)  Pulse (!) 57  Temp 36.7 °C (98.1 °F)  Resp 14  
 Ht 5' 4\" (1.626 m)  Wt 68 kg (150 lb)  SpO2 98%  BMI 25.75 kg/m2 Patient is status post spinal anesthesia for Procedure(s): 
KNEE ARTHROPLASTY TOTAL/ RIGHT/ . Nausea/Vomiting: None Postoperative hydration reviewed and adequate. Pain: 
Pain Scale 1: Numeric (0 - 10) (09/24/18 1106) Pain Intensity 1: 0 (09/24/18 1106) Managed Neurological Status:  
Neuro (WDL): Exceptions to WDL (09/24/18 1036) Neuro Neurologic State: Drowsy (09/24/18 1036) Cognition: Follows commands (09/24/18 1036) LUE Motor Response: Purposeful (09/24/18 1036) LLE Motor Response: Pharmacologically paralyzed (09/24/18 1036) RUE Motor Response: Purposeful (09/24/18 1036) RLE Motor Response: Pharmacologically paralyzed (09/24/18 1036) At baseline Mental Status and Level of Consciousness: Arousable Pulmonary Status:  
O2 Device: Nasal cannula (09/24/18 1106) Adequate oxygenation and airway patent Complications related to anesthesia: None Post-anesthesia assessment completed. No concerns Signed By: India Maciel MD   
 September 24, 2018

## 2018-09-24 NOTE — OP NOTES
[facility]  Cementless Total Knee Arthroplasty: Posterior Cruciate Retaining     Patient:Rossy Vergara   : 1950  Medical Record Number:[id number]  Pre-operative Diagnosis:  Primary osteoarthritis of right knee [M17.11]  Post-operative Diagnosis: Osteoarthritis of right knee  Location: [address] [place] [address]  Surgeon: Luisana Brady MD   Assistant: Slade Erwin    Anesthesia: Spinal and FNB    Procedure:Procedure(s) (LRB):  KNEE ARTHROPLASTY TOTAL/ RIGHT/  (Right)   The complexity of the total joint surgery requires the use of a first assistant for positioning, retraction and expertise in closure. Tourniquet Time: 0 minutes  EBL: 250 cc  Findings: severe degenerative arthritis, patellar osteophytes, posterior femoral osteophytes   BMI: Body mass index is 25.75 kg/(m^2). Hector Mclaughlin was brought to the operating room and positioned on the operating table. She was anesthestized with anesthesia. A mendoza catheter was placed preoperatively and IV antibiotics was administered. Prior to the incision being made a timeout was called identifying the patient, procedure ,operative side and surgeon The operative leg was prepped and draped in the usual sterile manner. An anterior longitudinal incision was accomplished just medial to the tibial tubercle and extending approximal 6 centimeters proximal to the superior pole of the patella. A medial parapatellar capsular incision was performed. The medial capsular flap was elevated around to the insertion of the semimembranous tendon. The patella was everted and the knee flexed and externally rotated. The medial and external menisci were excised. The lateral half of the fat pad excised and the patella femoral ligament was released. The anterior cruciate ligament was resect and the posterior cruciate ligament was retained. Using extramedullary instrumentation, the tibial cut was accomplished with appropriate posterior slope.       The distal femur was addressed. A drill hole was made above the intracondylar notch. Using appropriate intramedullary instrumentation,a five degree valgus distal cut was accomplished. The femur was sized. The anterior and posterior cuts were then made about the distal femur. The osteophytes were removed from the tibial and femoral surfaces. The flexion and extension gaps were assessed with the appropriate spacer blocks. Additional surgical procedures included: none. The flexion and extension gaps were deemed appropriately balanced. The appropriate cutting blocks were then utilized to perform the anterior, posterior and chamfer cuts, with appropriate lateral translation accomplished for the patellofemoral groove. Approximately 9 mm of bone was removed from the high side of the tibia. The tibia was sized. .  The tibial base plate was pinned into place with the appropriate external rotation and stem site prepared. A preliminary range of motion was accomplished. The  Patient was found to obtain full extension as well as appropriate flexion. The patient's ligaments were stable in flexion and extension to medial and lateral stressing and the alignment was through the appropriate mechanical axis. The patella was then everted. The bone was resect to accommodate the three peg patella button. A trial reduction revealed appropriate tracking through the patellofemoral groove with no lateral retinacular release being accomplished. All trial components were removed. The real implants were opened: Sizes listed below. The knee was irrigated. There were no femoral deficiencies. There were no tibial deficiencies. No augmentation was utilized. The permanent cementless Tibial and Femoral components were impacted into place. The cementless  patella component was then pressed in place.     [facility] knee was placed through range of motion and noted to be stable as mentioned above with the trail components. The wound was dry, therefore no drain was used. The operative knee was injected with 60 cc of Naropin, 10 cc's of morphine and 1 cc of 30 mg of Toradol. The knee was then soaked with a diluted betadine solution for approximately 3 min. This was then thoroughly irrigated. The capsular layer was closed using a #1 PDS suture. Then, 1 gram (100 mg/ml) of Transexamic Acid was injected into the joint space. The subcutaneous layers were closed using 2-0 Stratafix. Finally the skin was closed using 3-0 Vicryl and skin staples, which were applied in occlusive fashion and sterile bandage applied. An Iceman cryo pad was applied on the operative leg. Sponge count and needle counts were correct. Thedore Coins left the operating room     Implants:   Implant Name Type Inv.  Item Serial No.  Lot No. LRB No. Used   COMPNT FEM CR TRIATHLN 3 R PA --  - [id number]  COMPNT FEM CR TRIATHLN 3 R PA --  [id number] [employer] [facility] [id number] Right 1   BASEPLT TIB PC TRITNM SZ 4 -- TRIATHLON - [id number]  BASEPLT TIB PC TRITNM SZ 4 -- TRIATHLON [id number] [employer] HOW [id number] Right 1   PAT ASYM MTL-BK 10MM SZ A35 -- TRIATHLON - [id number]  PAT ASYM MTL-BK 10MM SZ A35 -- TRIATHLON [id number] [employer] HOW [id number] Right 1   INSERT TIB ARTC BRNG SZ4 13MM -- TRIATHLON - [id number]   INSERT TIB ARTC BRNG SZ4 13MM -- TRIATHLON [id number] [employer] HOW [id number] Right 1         Signed By: Ciro Craig MD   9/24/2018,  10:04 AM

## 2018-09-25 LAB
ANION GAP SERPL CALC-SCNC: 5 MMOL/L
BUN SERPL-MCNC: 12 MG/DL (ref 8–23)
CALCIUM SERPL-MCNC: 8.4 MG/DL (ref 8.3–10.4)
CHLORIDE SERPL-SCNC: 105 MMOL/L (ref 98–107)
CO2 SERPL-SCNC: 28 MMOL/L (ref 21–32)
CREAT SERPL-MCNC: 0.66 MG/DL (ref 0.6–1)
GLUCOSE SERPL-MCNC: 132 MG/DL (ref 65–100)
HGB BLD-MCNC: 10.1 G/DL (ref 11.7–15.4)
MM INDURATION POC: NORMAL MM (ref 0–5)
POTASSIUM SERPL-SCNC: 4.9 MMOL/L (ref 3.5–5.1)
PPD POC: NORMAL NEGATIVE
SODIUM SERPL-SCNC: 138 MMOL/L (ref 136–145)

## 2018-09-25 PROCEDURE — 97116 GAIT TRAINING THERAPY: CPT

## 2018-09-25 PROCEDURE — 36415 COLL VENOUS BLD VENIPUNCTURE: CPT

## 2018-09-25 PROCEDURE — 94760 N-INVAS EAR/PLS OXIMETRY 1: CPT

## 2018-09-25 PROCEDURE — 74011250637 HC RX REV CODE- 250/637: Performed by: PHYSICIAN ASSISTANT

## 2018-09-25 PROCEDURE — 65270000029 HC RM PRIVATE

## 2018-09-25 PROCEDURE — 97110 THERAPEUTIC EXERCISES: CPT

## 2018-09-25 PROCEDURE — 74011250636 HC RX REV CODE- 250/636: Performed by: PHYSICIAN ASSISTANT

## 2018-09-25 PROCEDURE — 85018 HEMOGLOBIN: CPT

## 2018-09-25 PROCEDURE — 80048 BASIC METABOLIC PNL TOTAL CA: CPT

## 2018-09-25 PROCEDURE — 97150 GROUP THERAPEUTIC PROCEDURES: CPT

## 2018-09-25 PROCEDURE — 97535 SELF CARE MNGMENT TRAINING: CPT

## 2018-09-25 PROCEDURE — 74011250637 HC RX REV CODE- 250/637: Performed by: ORTHOPAEDIC SURGERY

## 2018-09-25 RX ADMIN — HYDROMORPHONE HYDROCHLORIDE 2 MG: 2 TABLET ORAL at 15:23

## 2018-09-25 RX ADMIN — SENNOSIDES AND DOCUSATE SODIUM 2 TABLET: 8.6; 5 TABLET ORAL at 08:18

## 2018-09-25 RX ADMIN — HYDROMORPHONE HYDROCHLORIDE 2 MG: 2 TABLET ORAL at 10:57

## 2018-09-25 RX ADMIN — Medication 1 AMPULE: at 21:03

## 2018-09-25 RX ADMIN — ACETAMINOPHEN 1000 MG: 500 TABLET, FILM COATED ORAL at 15:23

## 2018-09-25 RX ADMIN — CELECOXIB 200 MG: 200 CAPSULE ORAL at 21:03

## 2018-09-25 RX ADMIN — DEXAMETHASONE SODIUM PHOSPHATE 10 MG: 10 INJECTION INTRAMUSCULAR; INTRAVENOUS at 15:24

## 2018-09-25 RX ADMIN — Medication 10 ML: at 06:16

## 2018-09-25 RX ADMIN — CELECOXIB 200 MG: 200 CAPSULE ORAL at 08:18

## 2018-09-25 RX ADMIN — FLUOXETINE 10 MG: 10 CAPSULE ORAL at 08:18

## 2018-09-25 RX ADMIN — VERAPAMIL HYDROCHLORIDE 120 MG: 120 TABLET, FILM COATED, EXTENDED RELEASE ORAL at 08:18

## 2018-09-25 RX ADMIN — Medication 5 ML: at 14:00

## 2018-09-25 RX ADMIN — ACETAMINOPHEN 1000 MG: 500 TABLET, FILM COATED ORAL at 06:10

## 2018-09-25 RX ADMIN — HYDROMORPHONE HYDROCHLORIDE 2 MG: 2 TABLET ORAL at 06:10

## 2018-09-25 RX ADMIN — ROPINIROLE HYDROCHLORIDE 1 MG: 1 TABLET, FILM COATED ORAL at 21:03

## 2018-09-25 RX ADMIN — Medication 10 ML: at 21:08

## 2018-09-25 RX ADMIN — Medication 1 AMPULE: at 08:18

## 2018-09-25 RX ADMIN — ACETAMINOPHEN 1000 MG: 500 TABLET, FILM COATED ORAL at 00:55

## 2018-09-25 RX ADMIN — HYDROMORPHONE HYDROCHLORIDE 2 MG: 2 TABLET ORAL at 21:04

## 2018-09-25 RX ADMIN — ASPIRIN 81 MG: 81 TABLET, COATED ORAL at 21:03

## 2018-09-25 RX ADMIN — GABAPENTIN 300 MG: 300 CAPSULE ORAL at 21:03

## 2018-09-25 RX ADMIN — ASPIRIN 81 MG: 81 TABLET, COATED ORAL at 08:18

## 2018-09-25 NOTE — PROGRESS NOTES
Pt states is going to nap. Denies needs. Neurovascular and peripheral vascular checks are WDL to BLE. Instructed not to ambulate without assistance from staff. Pt verbalized understanding. Call light in reach.

## 2018-09-25 NOTE — PROGRESS NOTES
Shift Assessment:    Patient resting quietly, alert and oriented, no distress noted.        Patient able to plantar and dorsi flex well; tingling noted.      Pain 2/10.      Dressing to surgical site on R knee clean/dry/intact.      Output: clear, yellow urine; mendoza.       Spouse at bedside.      Patient encouraged to use incentive spirometer.        Fresh ice placed in iceman.        Neurovascular and peripheral vascular checks WNL.        Bed low and locked position; bed alarm activated.       Call light within reach.        Patient instructed to call for assistance, verbalizes understanding.        Nursing assessment complete.

## 2018-09-25 NOTE — PROGRESS NOTES
2018         Post Op day: 1 Day Post-OpProcedure(s) (LRB):  KNEE ARTHROPLASTY TOTAL/ RIGHT/  (Right)      Admit Date: 2018  Admit Diagnosis: Primary osteoarthritis of right knee [M17.11]    LAB:    Recent Results (from the past 24 hour(s))   HEMOGLOBIN    Collection Time: 18  8:36 PM   Result Value Ref Range    HGB 10.5 (L) 11.7 - 15.4 g/dL   HEMOGLOBIN    Collection Time: 18  5:31 AM   Result Value Ref Range    HGB 10.1 (L) 11.7 - 12.2 g/dL   METABOLIC PANEL, BASIC    Collection Time: 18  5:31 AM   Result Value Ref Range    Sodium 138 136 - 145 mmol/L    Potassium 4.9 3.5 - 5.1 mmol/L    Chloride 105 98 - 107 mmol/L    CO2 28 21 - 32 mmol/L    Anion gap 5 mmol/L    Glucose 132 (H) 65 - 100 mg/dL    BUN 12 8 - 23 MG/DL    Creatinine 0.66 0.6 - 1.0 MG/DL    GFR est AA >60 >60 ml/min/1.73m2    GFR est non-AA >60 ml/min/1.73m2    Calcium 8.4 8.3 - 10.4 MG/DL   PLEASE READ & DOCUMENT PPD TEST IN 24 HRS    Collection Time: 18  6:00 AM   Result Value Ref Range    PPD  Negative    mm Induration  0 mm     Vital Signs:    Patient Vitals for the past 8 hrs:   BP Temp Pulse Resp SpO2 Weight   18 0330 142/76 97.8 °F (36.6 °C) 62 16 97 % -   18 0054 123/70 98.2 °F (36.8 °C) 62 16 94 % -   18 2345 - - - - - 68 kg (149 lb 14.6 oz)     Temp (24hrs), Av °F (36.7 °C), Min:97.5 °F (36.4 °C), Max:98.2 °F (36.8 °C)    Body mass index is 25.73 kg/(m^2).   Pain Control:   Pain Assessment  Pain Scale 1: Visual  Pain Intensity 1: 0  Pain Onset 1: postop  Pain Location 1: Knee  Pain Orientation 1: Right  Pain Description 1: Aching  Pain Intervention(s) 1: Medication (see MAR)    Subjective: Doing well, No complaints, No SOB, No Chest Pain, No Nausea or Vomitting     Objective: Vital Signs are Stable, No Acute Distress, Alert and Oriented, Dressing is Dry,  Neurovascular exam is normal.       PT/OT:            Assistive Device: Walker (comment)  RLE AROM  R Knee Flexion: 60  R Knee Extension: 10             Weight Bearing Status: WBAT    Meds:  [unfilled]  [unfilled]  [unfilled]    Assessment:   Patient Active Problem List   Diagnosis Code    Snoring R06.83    Osteoarthritis of right knee M17.11    Status post right knee replacement Z96.651             Plan: Continue Physical Therapy, Monitor  Hbg, D/C home tomorrow if stable        Signed By: Bruce Garcia NP

## 2018-09-25 NOTE — PROGRESS NOTES
09/25/18 0731   Oxygen Therapy   O2 Sat (%) 94 %   Pulse via Oximetry 68 beats per minute   O2 Device Room air   O2 Flow Rate (L/min) 0 l/min   Incentive Spirometry Treatment   Actual Volume (ml) 1100 ml

## 2018-09-25 NOTE — PROGRESS NOTES
Problem: Self Care Deficits Care Plan (Adult)  Goal: *Acute Goals and Plan of Care (Insert Text)  GOALS:   DISCHARGE GOALS (in preparation for going home/rehab):  3 days  All goals met 9/25/2018  1. Ms. Flaco Peraza will perform one lower body dressing activity with minimal assistance required to demonstrate improved functional mobility and safety. 2.  Ms. Flaco Peraza will perform one lower body bathing activity with minimal assistance required to demonstrate improved functional mobility and safety. 3.  Ms. Flaco Peraza will perform toileting/toilet transfer with contact guard assistance to demonstrate improved functional mobility and safety. 4.  Ms. Flaco Peraza will perform shower transfer with contact guard assistance to demonstrate improved functional mobility and safety. JOINT CAMP OCCUPATIONAL THERAPY TKA: Daily Note, Discharge and AM 9/25/2018  INPATIENT: Hospital Day: 2  [place] / Product Type: Medicare /      NAME/AGE/GENDER: Iker Hall is a 76 y.o. female   PRIMARY DIAGNOSIS:  Primary osteoarthritis of right knee [M17.11]   Procedure(s) and Anesthesia Type:     * KNEE ARTHROPLASTY TOTAL/ RIGHT/  - Spinal (Right)  ICD-10: Treatment Diagnosis:    · Pain in Right Knee (M25.561)  · Stiffness of Right Knee, Not elsewhere classified (M25.661)  · Generalized Muscle Weakness (M62.81)  · Other lack of cordination (R27.8)      ASSESSMENT:     Ms. Flaco Peraza is s/p R TKA and presents with decreased weight bearing on R LE and decreased independence with functional mobility and activities of daily living. Patient completed shower and dressing as charter below in ADL grid and is ambulating with rolling walker and contact guard assist.  Patient has met 4/4 goals and plans to return home with good family support. Family able to provide patient with appropriate level of assistance at this time.   OT reviewed safety precautions throughout session and therapy schedule for the remainder of today. Patient instructed to call for assistance when needing to get up from recliner and all needs in reach. Patient verbalized understanding of call light. This section established at most recent assessment   PROBLEM LIST (Impairments causing functional limitations):  1. Decreased Strength  2. Decreased ADL/Functional Activities  3. Decreased Transfer Abilities  4. Increased Pain  5. Increased Fatigue  6. Decreased Flexibility/Joint Mobility  7. Decreased Knowledge of Precautions   INTERVENTIONS PLANNED: (Benefits and precautions of occupational therapy have been discussed with the patient.)  1. Activities of daily living training  2. Adaptive equipment training  3. Balance training  4. Clothing management  5. Donning&doffing training  6. Theraputic activity     TREATMENT PLAN: Frequency/Duration: Follow patient 1-2x to address above goals. Rehabilitation Potential For Stated Goals: Excellent     RECOMMENDED REHABILITATION/EQUIPMENT: (at time of discharge pending progress): Continue Skilled Therapy and Home Health: Physical Therapy. OCCUPATIONAL PROFILE AND HISTORY:   History of Present Injury/Illness (Reason for Referral): Pt presents this date s/p (right) TKA. Past Medical History/Comorbidities:   Ms. Shelbi Cameron  has a past medical history of Arthritis; Chronic pain; Environmental allergies; Hypertension; Ill-defined condition; Migraine; Psychiatric disorder; RLS (restless legs syndrome); and Rosacea. She also has no past medical history of Adverse effect of anesthesia; Difficult intubation; Malignant hyperthermia due to anesthesia; Nausea & vomiting; or Pseudocholinesterase deficiency. Ms. Shelbi Cameron  has a past surgical history that includes pr abdomen surgery proc unlisted; hx appendectomy; and hx dilation and curettage.   Social History/Living Environment:   Home Environment: Private residence  # Steps to Enter: 1  One/Two Story Residence: [facility] story, live on 1st floor  Living Alone: No  Support Systems: Family member(s)  Patient Expects to be Discharged to[de-identified] Private residence  Current DME Used/Available at Home: None  Tub or Shower Type: Shower  Prior Level of Function/Work/Activity:  Indep with self care and functional mobility     Number of Personal Factors/Comorbidities that affect the Plan of Care: Brief history (0):  LOW COMPLEXITY   ASSESSMENT OF OCCUPATIONAL PERFORMANCE[de-identified]   Most Recent Physical Functioning:   Balance  Sitting: Intact  Standing: With support                              Mental Status  Neurologic State: Alert  Orientation Level: Oriented X4  Cognition: Follows commands  Perception: Appears intact  Perseveration: No perseveration noted  Safety/Judgement: Awareness of environment; Fall prevention                Basic ADLs (From Assessment) Complex ADLs (From Assessment)   Basic ADL  Feeding: Supervision  Oral Facial Hygiene/Grooming: Supervision  Bathing: Moderate assistance  Type of Bath: Chlorhexidine (CHG), Full, Shower  Upper Body Dressing: Minimum assistance  Lower Body Dressing: Maximum assistance  Toileting: Total assistance     Grooming/Bathing/Dressing Activities of Daily Living   Grooming  Grooming Assistance: Supervision/set up  Brushing/Combing Hair: Supervision/set-up Cognitive Retraining  Safety/Judgement: Awareness of environment; Fall prevention   Upper Body Bathing  Bathing Assistance: Supervision/set-up  Position Performed: Seated in chair;Standing  Cues: Verbal cues provided  Adaptive Equipment: Grab bar; Shower chair Feeding  Feeding Assistance: Supervision/set-up   Lower Body Bathing  Bathing Assistance: Supervision/set-up  Perineal  : Supervision/set-up  Position Performed: Seated in chair;Standing  Cues: Verbal cues provided  Adaptive Equipment: Grab bar  Lower Body : Supervision/set-up  Position Performed: Seated in chair;Standing  Cues: Verbal cues provided  Adaptive Equipment: Grab bar; Shower chair Toileting  Toileting Assistance: Supervision/set up  Adaptive Equipment: Grab bars   Upper Body Dressing Assistance  Dressing Assistance: Supervision/set-up  Bra: Supervision/set-up  Pullover Shirt: Supervision/set-up Functional Transfers  Bathroom Mobility: Contact guard assistance  Toilet Transfer : Contact guard assistance  Shower Transfer: Contact guard assistance  Cues: Verbal cues provided  Adaptive Equipment: Grab bars; Shower chair with back; Bedside commode   Lower Body Dressing Assistance  Dressing Assistance: Supervision/set up  Underpants: Supervision/set-up  Pants With Elastic Waist: Supervision/set-up  Shoes with Cloth Laces: Compensatory technique training Bed/Mat Mobility  Supine to Sit: Contact guard assistance  Sit to Stand: Contact guard assistance  Bed to Chair: Contact guard assistance         Physical Skills Involved:  1. Range of Motion  2. Balance  3. Strength Cognitive Skills Affected (resulting in the inability to perform in a timely and safe manner):  1. None Psychosocial Skills Affected:  1. None   Number of elements that affect the Plan of Care: 1-3:  LOW COMPLEXITY   CLINICAL DECISION MAKIN[address] [place] [id number] AM-PAC 6 Clicks   Daily Activity Inpatient Short Form  How much help from another person does the patient currently need. .. Total A Lot A Little None   1. Putting on and taking off regular lower body clothing? [] 1   [] 2   [x] 3   [] 4   2. Bathing (including washing, rinsing, drying)? [] 1   [] 2   [] 3   [x] 4   3. Toileting, which includes using toilet, bedpan or urinal?   [] 1   [] 2   [] 3   [x] 4   4. Putting on and taking off regular upper body clothing? [] 1   [] 2   [] 3   [x] 4   5. Taking care of personal grooming such as brushing teeth? [] 1   [] 2   [] 3   [x] 4   6. Eating meals? [] 1   [] 2   [] 3   [x] 4   © , Trustees of [address] [place] [id number], under license to [employer].  All rights reserved     Score:  Initial: 16 Most Recent: 23 (Date:9-25-18 )    Interpretation of Tool:  Represents activities that are increasingly more difficult (i.e. Bed mobility, Transfers, Gait). Score 24 23 22-20 19-15 14-10 9-7 6     Modifier CH CI CJ CK CL CM CN      ? Self Care:     - CURRENT STATUS: CK - 40%-59% impaired, limited or restricted    - GOAL STATUS: CJ - 20%-39% impaired, limited or restricted    - D/C STATUS:  CI - 1%-19% impaired, limited or restricted  [place] / Product Type: Medicare /      Medical Necessity:     · Patient is expected to demonstrate progress in balance, coordination and functional technique to decrease assistance required with self care and functional mobility. Reason for Services/Other Comments:  · Patient continues to require skilled intervention due to decreased self care and functional mobility. Use of outcome tool(s) and clinical judgement create a POC that gives a: LOW COMPLEXITY            TREATMENT:   (In addition to Assessment/Re-Assessment sessions the following treatments were rendered)     Pre-treatment Symptoms/Complaints:  None  Pain: Initial:   Pain Intensity 1: 0  Post Session:  0     Self Care: (40): Procedure(s) (per grid) utilized to improve and/or restore self-care/home management as related to dressing, bathing, toileting and grooming. Required minimal verbal and   cueing to facilitate activities of daily living skills and compensatory activities. Treatment/Session Assessment:     Response to Treatment:  Tolerated well.     Education:  [] Home Exercises  [x] Fall Precautions  [] Hip Precautions [] Going Home Video  [x] Knee/Hip Prosthesis Review  [x] Walker Management/Safety [x] Adaptive Equipment as Needed       Interdisciplinary Collaboration:   o Occupational Therapist  o Registered Nurse    After treatment position/precautions:   o Up in chair  o Bed/Chair-wheels locked  o Caregiver at bedside  o Call light within reach  o RN notified Compliance with Program/Exercises: compliant all of the time. Recommendations: Pt doing well all goals met and will do well at home with support from spouse. Patient will be discharged home with home health PT. No further Occupational Therapy warranted, will discharge Occupational Therapy services.       Total Treatment Duration:  OT Patient Time In/Time Out  Time In: 0845  Time Out: [address], [place]

## 2018-09-25 NOTE — PROGRESS NOTES
Problem: Mobility Impaired (Adult and Pediatric)  Goal: *Acute Goals and Plan of Care (Insert Text)  GOALS (1-4 days):  (1.)Ms. Mustapha Nicole will move from supine to sit and sit to supine  in bed with STAND BY ASSIST.  (2.)Ms. Mustapha Nicole will transfer from bed to chair and chair to bed with STAND BY ASSIST using the least restrictive device. (3.)Ms. Mustapha Nicole will ambulate with STAND BY ASSIST for 150 feet with the least restrictive device. (4.)Ms. Mustapha Nicole will ambulate up/down 2 steps with left railing with MINIMAL ASSIST with device as needed. (5.)Ms. Mustapha Nicole will increase right knee ROM to 5°-80°.  ________________________________________________________________________________________________      PHYSICAL THERAPY Joint camp tKa: Daily Note, PM 9/25/2018  INPATIENT: Hospital Day: 2  [place] / Product Type: Medicare /      NAME/AGE/GENDER: True Barrett is a 76 y.o. female   PRIMARY DIAGNOSIS:  Primary osteoarthritis of right knee [M17.11]   Procedure(s) and Anesthesia Type:     * KNEE ARTHROPLASTY TOTAL/ RIGHT/  - Spinal (Right)  ICD-10: Treatment Diagnosis:    · Pain in Right Knee (M25.561)  · Stiffness of Right Knee, Not elsewhere classified (M25.661)  · Difficulty in walking, Not elsewhere classified (R26.2)      ASSESSMENT:     Ms. Mustapha Nicole presents with decreased rom and strength of right LE as well as decreased functional mobility and gait s/p right tka. She plans to go home with HHPT. Pt. Doing good, some more soreness in knee. She ambulated with walker back and forth to the gym CGA. She did tka exercises with cues. No questions. Plans to go home tomorrow. This section established at most recent assessment   PROBLEM LIST (Impairments causing functional limitations):  1. Decreased Strength  2. Decreased ADL/Functional Activities  3. Decreased Transfer Abilities  4. Decreased Ambulation Ability/Technique  5.  Decreased Balance  6. Increased Pain  7. Decreased Activity Tolerance  8. Decreased Flexibility/Joint Mobility  9. Decreased Darke with Home Exercise Program   INTERVENTIONS PLANNED: (Benefits and precautions of physical therapy have been discussed with the patient.)  1. Bed Mobility  2. Gait Training  3. Home Exercise Program (HEP)  4. Therapeutic Exercise/Strengthening  5. Transfer Training  6. Range of Motion: active/assisted/passive  7. Therapeutic Activities  8. Group Therapy     TREATMENT PLAN: Frequency/Duration: Follow patient BID for duration of hospital stay to address above goals. Rehabilitation Potential For Stated Goals: Good     RECOMMENDED REHABILITATION/EQUIPMENT: (at time of discharge pending progress): Continue Skilled Therapy and Home Health: Physical Therapy. HISTORY:   History of Present Injury/Illness (Reason for Referral):  S/p right tka  Past Medical History/Comorbidities:   Ms. Alexis Pastrana  has a past medical history of Arthritis; Chronic pain; Environmental allergies; Hypertension; Ill-defined condition; Migraine; Psychiatric disorder; RLS (restless legs syndrome); and Rosacea. She also has no past medical history of Adverse effect of anesthesia; Difficult intubation; Malignant hyperthermia due to anesthesia; Nausea & vomiting; or Pseudocholinesterase deficiency. Ms. Alexis Pastrana  has a past surgical history that includes pr abdomen surgery proc unlisted; hx appendectomy; and hx dilation and curettage.   Social History/Living Environment:   Home Environment: Private residence  # Steps to Enter: 1  One/Two Story Residence: Two story, live on 1st floor  Living Alone: No  Support Systems: Family member(s)  Patient Expects to be Discharged to[de-identified] Private residence  Current DME Used/Available at Home: None  Tub or Shower Type: Shower  Prior Level of Function/Work/Activity:  independnet   Number of Personal Factors/Comorbidities that affect the Plan of Care: 1-2: MODERATE COMPLEXITY EXAMINATION:   Most Recent Physical Functioning:               RLE AROM  R Knee Flexion: 92  R Knee Extension: 5       RLE Strength  R Knee Flexion: 3  R Knee Extension: 3    Bed Mobility  Supine to Sit: Contact guard assistance  Sit to Supine: Contact guard assistance    Transfers  Sit to Stand: Contact guard assistance  Stand to Sit: Contact guard assistance  Bed to Chair: Contact guard assistance    Balance  Sitting: Intact  Standing: With support              Weight Bearing Status  Right Side Weight Bearing: As tolerated  Distance (ft): 100 Feet (ft) (x 2)  Ambulation - Level of Assistance: Contact guard assistance  Assistive Device: Walker, rolling  Speed/Tara: Delayed  Stance: Right decreased  Gait Abnormalities: Antalgic  Interventions: Safety awareness training;Verbal cues     Braces/Orthotics:     Right Knee Cold  Type: Cryocuff      Body Structures Involved:  1. Bones  2. Joints  3. Muscles  4. Ligaments Body Functions Affected:  1. Movement Related Activities and Participation Affected:  1. Mobility   Number of elements that affect the Plan of Care: 3: MODERATE COMPLEXITY   CLINICAL PRESENTATION:   Presentation: Stable and uncomplicated: LOW COMPLEXITY   CLINICAL DECISION MAKIN [facility] [place] [address] [address] AM-PAC 6 Clicks   Basic Mobility Inpatient Short Form  How much difficulty does the patient currently have. .. Unable A Lot A Little None   1. Turning over in bed (including adjusting bedclothes, sheets and blankets)? [] 1   [] 2   [x] 3   [] 4   2. Sitting down on and standing up from a chair with arms ( e.g., wheelchair, bedside commode, etc.)   [] 1   [] 2   [x] 3   [] 4   3. Moving from lying on back to sitting on the side of the bed? [] 1   [] 2   [x] 3   [] 4   How much help from another person does the patient currently need. .. Total A Lot A Little None   4. Moving to and from a bed to a chair (including a wheelchair)? [] 1   [] 2   [x] 3   [] 4   5. Need to walk in hospital room?    [] 1   [x] 2   [] 3   [] 4   6. Climbing 3-5 steps with a railing? [] 1   [x] 2   [] 3   [] 4   © 2007, Trustees of [facility] [employer], under license to [employer]. All rights reserved        Score:  Initial: 16 Most Recent: X (Date: -- )    Interpretation of Tool:  Represents activities that are increasingly more difficult (i.e. Bed mobility, Transfers, Gait). Score 24 23 22-20 19-15 14-10 9-7 6     Modifier CH CI CJ CK CL CM CN      ? Mobility - Walking and Moving Around:     - CURRENT STATUS: CK - 40%-59% impaired, limited or restricted    - GOAL STATUS: CJ - 20%-39% impaired, limited or restricted    - D/C STATUS:  ---------------To be determined---------------  [place] / Product Type: Medicare /      Medical Necessity:     · Patient is expected to demonstrate progress in strength, range of motion and balance to decrease assistance required with theraputic exercises and functional mobility. Reason for Services/Other Comments:  · Patient continues to require present interventions due to patient's inability to perform theraputic exercises and functional mobility independently. Use of outcome tool(s) and clinical judgement create a POC that gives a: Clear prediction of patient's progress: LOW COMPLEXITY            TREATMENT:   (In addition to Assessment/Re-Assessment sessions the following treatments were rendered)     Pre-treatment Symptoms/Complaints:  Knee pain  Pain: Initial:      Post Session:  4     Therapeutic Exercise: (45 Minutes):  Exercises per grid below to improve mobility and strength. Required minimal visual, verbal and manual cues to promote proper body alignment, promote proper body posture and promote proper body mechanics. Progressed range and repetitions as indicated. Gait Training (15 Minutes):  Gait training to improve and/or restore physical functioning as related to mobility, strength and balance.   Ambulated 100 Feet (ft) (x 2) with Contact guard assistance using a Walker, rolling and minimal Safety awareness training;Verbal cues related to their stance phase to promote proper body alignment, promote proper body posture and promote proper body mechanics. Date:  9/24 Date:  9/25 Date:     ACTIVITY/EXERCISE AM PM AM PM AM PM   GROUP THERAPY  []  []  []  [x]  []  []   Ankle Pumps  10a 10a 15a     Quad Sets  10a 10a 15a     Gluteal Sets  10a 10a 15a     Hip ABd/ADduction  10a 10a 15a     Straight Leg Raises   10a 15a     Knee Slides  10a 10a 15aa     Short Arc Quads    15a     Long Arc Quads         Chair Slides    15a              B = bilateral; AA = active assistive; A = active; P = passive      Treatment/Session Assessment:     Response to Treatment:  Pt. Did well. Education:  [x] Home Exercises  [x] Fall Precautions  [] Hip Precautions [x] D/C Instruction Review  [x] Knee/Hip Prosthesis Review  [x] Walker Management/Safety [] Adaptive Equipment as Needed       Interdisciplinary Collaboration:   o Physical Therapist  o Physical Therapy Assistant    After treatment position/precautions:   o Supine in bed  o Bed/Chair-wheels locked  o Bed in low position  o Caregiver at bedside  o Call light within reach  o Family at bedside    Compliance with Program/Exercises: Will assess as treatment progresses. No questions. Recommendations/Intent for next treatment session:  Treatment next visit will focus on increasing Ms. Werner's independence with bed mobility, transfers, gait training, strength/ROM exercises, modalities for pain, and patient education.       Total Treatment Duration:  PT Patient Time In/Time Out  Time In: 1300  Time Out: [address]avira, PT

## 2018-09-25 NOTE — PROGRESS NOTES
Problem: Mobility Impaired (Adult and Pediatric)  Goal: *Acute Goals and Plan of Care (Insert Text)  GOALS (1-4 days):  (1.)Ms. Nahum Benítez will move from supine to sit and sit to supine  in bed with STAND BY ASSIST.  (2.)Ms. Nahum Benítez will transfer from bed to chair and chair to bed with STAND BY ASSIST using the least restrictive device. (3.)Ms. Nahum Benítez will ambulate with STAND BY ASSIST for 150 feet with the least restrictive device. (4.)Ms. Nahum Benítez will ambulate up/down 2 steps with left railing with MINIMAL ASSIST with device as needed. (5.)Ms. Nahum Benítez will increase right knee ROM to 5°-80°.  ________________________________________________________________________________________________      PHYSICAL THERAPY [facility]: Daily Note, AM 9/25/2018  INPATIENT: Hospital Day: 2  [place] / [place] / Product Type: Medicare /      NAME/AGE/GENDER: Chhaya Brand is a 76 y.o. female   PRIMARY DIAGNOSIS:  Primary osteoarthritis of right knee [M17.11]   Procedure(s) and Anesthesia Type:     * KNEE ARTHROPLASTY TOTAL/ RIGHT/  - Spinal (Right)  ICD-10: Treatment Diagnosis:    · Pain in Right Knee (M25.561)  · Stiffness of Right Knee, Not elsewhere classified (M25.661)  · Difficulty in walking, Not elsewhere classified (R26.2)      ASSESSMENT:     Ms. Nahum Benítez presents with decreased rom and strength of right LE as well as decreased functional mobility and gait s/p right tka. She plans to go home with HHPT. Pt. Doing well this morning. Not much pain yet but she knows it will get worse. She ambulated in the ramires with walker and did tka exercises with cues. No questions. This section established at most recent assessment   PROBLEM LIST (Impairments causing functional limitations):  1. Decreased Strength  2. Decreased ADL/Functional Activities  3. Decreased Transfer Abilities  4. Decreased Ambulation Ability/Technique  5. Decreased Balance  6.  Increased Pain  7. Decreased Activity Tolerance  8. Decreased Flexibility/Joint Mobility  9. Decreased [place] with Home Exercise Program   INTERVENTIONS PLANNED: (Benefits and precautions of physical therapy have been discussed with the patient.)  1. Bed Mobility  2. Gait Training  3. Home Exercise Program (HEP)  4. Therapeutic Exercise/Strengthening  5. Transfer Training  6. Range of Motion: active/assisted/passive  7. Therapeutic Activities  8. Group Therapy     TREATMENT PLAN: Frequency/Duration: Follow patient BID for duration of hospital stay to address above goals. Rehabilitation Potential For Stated Goals: Good     RECOMMENDED REHABILITATION/EQUIPMENT: (at time of discharge pending progress): Continue Skilled Therapy and Home Health: Physical Therapy. HISTORY:   History of Present Injury/Illness (Reason for Referral):  S/p right tka  Past Medical History/Comorbidities:   Ms. Chase Rodriguez  has a past medical history of Arthritis; Chronic pain; Environmental allergies; Hypertension; Ill-defined condition; Migraine; Psychiatric disorder; RLS (restless legs syndrome); and Rosacea. She also has no past medical history of Adverse effect of anesthesia; Difficult intubation; Malignant hyperthermia due to anesthesia; Nausea & vomiting; or Pseudocholinesterase deficiency. Ms. Chase Rodriguez  has a past surgical history that includes pr abdomen surgery proc unlisted; hx appendectomy; and hx dilation and curettage.   Social History/Living Environment:   Home Environment: Private residence  # Steps to Enter: 1  One/Two Story Residence: Two story, live on 1st floor  Living Alone: No  Support Systems: Family member(s)  Patient Expects to be Discharged to[de-identified] Private residence  Current DME Used/Available at Home: None  Tub or Shower Type: Shower  Prior Level of Function/Work/Activity:  independnet   Number of Personal Factors/Comorbidities that affect the Plan of Care: 1-2: MODERATE COMPLEXITY   EXAMINATION:   Most Recent Physical Functioning:               RLE AROM  R Knee Flexion: 60  R Knee Extension: 10            Bed Mobility  Supine to Sit: Contact guard assistance    Transfers  Sit to Stand: Contact guard assistance  Stand to Sit: Contact guard assistance  Bed to Chair: Contact guard assistance    Balance  Sitting: Intact  Standing: With support              Weight Bearing Status  Right Side Weight Bearing: As tolerated  Distance (ft): 80 Feet (ft)  Ambulation - Level of Assistance: Contact guard assistance  Assistive Device: Walker, rolling  Speed/Tara: Delayed  Stance: Right decreased  Gait Abnormalities: Antalgic  Interventions: Safety awareness training;Verbal cues     Braces/Orthotics:     Right Knee Cold  Type: Cryocuff      Body Structures Involved:  1. Bones  2. Joints  3. Muscles  4. Ligaments Body Functions Affected:  1. Movement Related Activities and Participation Affected:  1. Mobility   Number of elements that affect the Plan of Care: 3: MODERATE COMPLEXITY   CLINICAL PRESENTATION:   Presentation: Stable and uncomplicated: LOW COMPLEXITY   CLINICAL DECISION MAKIN [facility] [id number] AM-PAC 6 Clicks   Basic Mobility Inpatient Short Form  How much difficulty does the patient currently have. .. Unable A Lot A Little None   1. Turning over in bed (including adjusting bedclothes, sheets and blankets)? [] 1   [] 2   [x] 3   [] 4   2. Sitting down on and standing up from a chair with arms ( e.g., wheelchair, bedside commode, etc.)   [] 1   [] 2   [x] 3   [] 4   3. Moving from lying on back to sitting on the side of the bed? [] 1   [] 2   [x] 3   [] 4   How much help from another person does the patient currently need. .. Total A Lot A Little None   4. Moving to and from a bed to a chair (including a wheelchair)? [] 1   [] 2   [x] 3   [] 4   5. Need to walk in hospital room? [] 1   [x] 2   [] 3   [] 4   6. Climbing 3-5 steps with a railing?    [] 1   [x] 2   [] 3   [] 4   © 2007, Trustees of [employer], under license to [employer]. All rights reserved        Score:  Initial: 16 Most Recent: X (Date: -- )    Interpretation of Tool:  Represents activities that are increasingly more difficult (i.e. Bed mobility, Transfers, Gait). Score 24 23 22-20 19-15 14-10 9-7 6     Modifier CH CI CJ CK CL CM CN      ? Mobility - Walking and Moving Around:     - CURRENT STATUS: CK - 40%-59% impaired, limited or restricted    - GOAL STATUS: CJ - 20%-39% impaired, limited or restricted    - D/C STATUS:  ---------------To be determined---------------  [place] / [place] / Product Type: Medicare /      Medical Necessity:     · Patient is expected to demonstrate progress in strength, range of motion and balance to decrease assistance required with theraputic exercises and functional mobility. Reason for Services/Other Comments:  · Patient continues to require present interventions due to patient's inability to perform theraputic exercises and functional mobility independently. Use of outcome tool(s) and clinical judgement create a POC that gives a: Clear prediction of patient's progress: LOW COMPLEXITY            TREATMENT:   (In addition to Assessment/Re-Assessment sessions the following treatments were rendered)     Pre-treatment Symptoms/Complaints:  Knee pain  Pain: Initial:      Post Session:  0     Therapeutic Exercise: (15 Minutes):  Exercises per grid below to improve mobility and strength. Required minimal visual, verbal and manual cues to promote proper body alignment, promote proper body posture and promote proper body mechanics. Progressed range and repetitions as indicated. Gait Training (10 Minutes):  Gait training to improve and/or restore physical functioning as related to mobility, strength and balance.   Ambulated 80 Feet (ft) with Contact guard assistance using a Walker, rolling and minimal Safety awareness training;Verbal cues related to their stance phase to promote proper body alignment, promote proper body posture and promote proper body mechanics. Date:  9/24 Date:  9/25 Date:     ACTIVITY/EXERCISE AM PM AM PM AM PM   GROUP THERAPY  []  []  []  []  []  []   Ankle Pumps  10a 10a      Quad Sets  10a 10a      Gluteal Sets  10a 10a      Hip ABd/ADduction  10a 10a      Straight Leg Raises   10a      Knee Slides  10a 10a      Short Arc Quads         Long Arc Quads         Chair Slides                  B = bilateral; AA = active assistive; A = active; P = passive      Treatment/Session Assessment:     Response to Treatment:  Pt. Doing well this am    Education:  [x] Home Exercises  [x] Fall Precautions  [] Hip Precautions [] D/C Instruction Review  [x] Knee/Hip Prosthesis Review  [x] Walker Management/Safety [] Adaptive Equipment as Needed       Interdisciplinary Collaboration:   o Registered Nurse    After treatment position/precautions:   o Up in chair  o Bed/Chair-wheels locked  o Bed in low position  o Caregiver at bedside  o Call light within reach  o Family at bedside    Compliance with Program/Exercises: Will assess as treatment progresses. No questions. Recommendations/Intent for next treatment session:  Treatment next visit will focus on increasing Ms. Werner's independence with bed mobility, transfers, gait training, strength/ROM exercises, modalities for pain, and patient education.       Total Treatment Duration:  PT Patient Time In/Time Out  Time In: [facility]  Time Out: [address] [facility],

## 2018-09-25 NOTE — PROGRESS NOTES
Pt ambulated to restroom with walker. Neurovascular and peripheral vascular checks are WDL to BLE. Instructed not to ambulate without assistance from staff. Pt verbalized understanding. Call light in reach.

## 2018-09-25 NOTE — PROGRESS NOTES
Patient had an uneventful night; slept well; pain well-managed with PO Dilaudid. Hourly rounds were performed throughout the shift. All needs met at this time. Report given to oncoming nurse.

## 2018-09-26 VITALS
WEIGHT: 149.91 LBS | SYSTOLIC BLOOD PRESSURE: 169 MMHG | HEIGHT: 64 IN | OXYGEN SATURATION: 95 % | DIASTOLIC BLOOD PRESSURE: 83 MMHG | RESPIRATION RATE: 18 BRPM | TEMPERATURE: 96.7 F | BODY MASS INDEX: 25.59 KG/M2 | HEART RATE: 65 BPM

## 2018-09-26 LAB
HGB BLD-MCNC: 10.5 G/DL (ref 11.7–15.4)
MM INDURATION POC: NORMAL MM (ref 0–5)
PPD POC: NORMAL NEGATIVE

## 2018-09-26 PROCEDURE — 74011250637 HC RX REV CODE- 250/637: Performed by: ORTHOPAEDIC SURGERY

## 2018-09-26 PROCEDURE — 97116 GAIT TRAINING THERAPY: CPT

## 2018-09-26 PROCEDURE — 74011250637 HC RX REV CODE- 250/637: Performed by: PHYSICIAN ASSISTANT

## 2018-09-26 PROCEDURE — 85018 HEMOGLOBIN: CPT

## 2018-09-26 PROCEDURE — 36415 COLL VENOUS BLD VENIPUNCTURE: CPT

## 2018-09-26 PROCEDURE — 97150 GROUP THERAPEUTIC PROCEDURES: CPT

## 2018-09-26 RX ADMIN — FLUOXETINE 10 MG: 10 CAPSULE ORAL at 08:48

## 2018-09-26 RX ADMIN — Medication 10 ML: at 05:14

## 2018-09-26 RX ADMIN — ACETAMINOPHEN 1000 MG: 500 TABLET, FILM COATED ORAL at 05:10

## 2018-09-26 RX ADMIN — HYDROMORPHONE HYDROCHLORIDE 2 MG: 2 TABLET ORAL at 08:49

## 2018-09-26 RX ADMIN — Medication 1 AMPULE: at 08:49

## 2018-09-26 RX ADMIN — ACETAMINOPHEN 1000 MG: 500 TABLET, FILM COATED ORAL at 01:17

## 2018-09-26 RX ADMIN — VERAPAMIL HYDROCHLORIDE 120 MG: 120 TABLET, FILM COATED, EXTENDED RELEASE ORAL at 07:21

## 2018-09-26 RX ADMIN — SENNOSIDES AND DOCUSATE SODIUM 2 TABLET: 8.6; 5 TABLET ORAL at 08:48

## 2018-09-26 RX ADMIN — HYDROMORPHONE HYDROCHLORIDE 2 MG: 2 TABLET ORAL at 01:17

## 2018-09-26 RX ADMIN — ASPIRIN 81 MG: 81 TABLET, COATED ORAL at 08:48

## 2018-09-26 RX ADMIN — HYDROMORPHONE HYDROCHLORIDE 2 MG: 2 TABLET ORAL at 05:10

## 2018-09-26 RX ADMIN — CELECOXIB 200 MG: 200 CAPSULE ORAL at 08:48

## 2018-09-26 NOTE — PROGRESS NOTES
Bp has lowered from early morning reading after Bp med given. [place] Bp 169/83, HR 65. Spouse at bedside. Call light in reach.

## 2018-09-26 NOTE — PROGRESS NOTES
Patient had an uneventful night; slept well; ambulating and voiding well; pain well-managed with po dilaudid. Hourly rounds were performed throughout the shift. All needs met at this time. Report given to oncoming nurse.

## 2018-09-26 NOTE — DISCHARGE INSTRUCTIONS
[address] [facility]   Patient Discharge Instructions    Marija Yeager / [id number] : 1950    Admitted 2018 Discharged: 2018     IF YOU HAVE ANY PROBLEMS ONCE YOU ARE AT HOME CALL THE FOLLOWING NUMBERS:   Main office number: [phone]      Medications    · The medications you are to continue on are listed on the medication reconciliation sheet. · Narcotic pain medications as well as supplemental iron can cause constipation. If this occurs try stopping the narcotic pain medication and/or the iron. · It is important that you take the medication exactly as they are prescribed. · Medications which increase your risk of blood clots are listed to stop for 5 weeks after surgery as well as medications or supplements which increase your risk of bleeding complications. · Keep your medication in the bottles provided by the pharmacist and keep a list of the medication names, dosages, and times to be taken in your wallet. · Do not take other medications without consulting your doctor. Important Information    Do NOT smoke as this will greatly increase your risk of infection! Resume your prehospital diet. If you have excessive nausea or vomitting call your doctor's office     Leg swelling and warmth is normal for 6 months after surgery. If you experience swelling in your leg elevate you leg while laying down with your toes above your heart. If you have sudden onset severe swelling with leg pain call our office. The stitches deep inside take approximately 6 months to dissolve. There will be sharp shooting, stinging and burning pain. This is normal and will resolve between 3-6 months after surgery. Difficulty sleeping is normal following total Knee and Hip replacement. You may try melatonin, an over-the-counter sleep aid or benadryl to help with sleep. Most patients will resume sleeping through the night 8 weeks after surgery. Home Physical Therapy is arranged. Home Health will contact you within 48 hrs of discharge that you have chosen. If you have not received a call within this time frame please contact that provider you chose. You should be given this information before you leave the hospital.     You are at a risk for falls. Use the rolling walker when walking. Patients who have had a joint replacement should not drive if they are still taking narcotic pain mediation during the daytime hours. Most patients wean themselves off of pain medication within 2-5 weeks after surgery. When to Call the office    - If you have a temperature greater then 101  - Uncontrolled vomiting   - Loose control of your bladder or bowel function  - Are unable to bear any weight   - Need a pain medication refill       DISCHARGE SUMMARY from Nurse    The following personal items collected during your admission are returned to you:   Dental Appliance: Dental Appliances: None  Vision: Visual Aid: Glasses, With patient  Hearing Aid:   na  Jewelry: Jewelry: None  Clothing:   self  Other Valuables: Other Valuables: Cell Phone (with Kate Verdugo)  Valuables sent to safe:   na    PATIENT INSTRUCTIONS:    After general anesthesia or intravenous sedation, for 24 hours or while taking prescription Narcotics:  · Limit your activities  · Do not drive and operate hazardous machinery  · Do not make important personal or business decisions  · Do  not drink alcoholic beverages  · If you have not urinated within 8 hours after discharge, please contact your surgeon on call.     Report the following to your surgeon:  · Excessive pain, swelling, redness or odor of or around the surgical area  · Temperature over 101  · Nausea and vomiting lasting longer than 4 hours or if unable to take medications  · Any signs of decreased circulation or nerve impairment to extremity: change in color, persistent  numbness, tingling, coldness or increase pain  · Any questions, call office @ [phone]      Keep scheduled follow up appointment. If need to change, call office @ [phone]. *  Please give a list of your current medications to your Primary Care Provider. *  Please update this list whenever your medications are discontinued, doses are      changed, or new medications (including over-the-counter products) are added. *  Please carry medication information at all times in case of emergency situations. Total Knee Replacement: What to Expect at [address] [place] [address]    When you leave the hospital, you should be able to move around with a walker or crutches. But you will need someone to help you at home for the next few weeks or until you have more energy and can move around better. If there is no one to help you at home, you may go to a rehabilitation center. You will go home with a bandage and stitches, staples, tissue glue, or tape strips. Change the bandage as your doctor tells you to. If you have stitches or staples, your doctor will remove them 10 to 21 days after your surgery. Glue or tape strips will fall off on their own over time. You may still have some mild pain, and the area may be swollen for 3 to 6 months after surgery. Your knee will continue to improve for 6 to 12 months. You will probably use a walker for 1 to 3 weeks and then use crutches. When you are ready, you can use a cane. You will probably be able to walk on your own in 4 to 8 weeks. You will need to do months of physical rehabilitation (rehab) after a knee replacement. Rehab will help you strengthen the muscles of the knee and help you regain movement. After you recover, your artificial knee will allow you to do normal daily activities with less pain or no pain at all. You may be able to hike, dance, ride a bike, and play golf. Talk to your doctor about whether you can do more strenuous activities. Always tell your caregivers that you have an artificial knee.   How long it will take to walk on your own, return to normal activities, and go back to work depends on your health and how well your rehabilitation (rehab) program goes. The better you do with your rehab exercises, the quicker you will get your strength and movement back. This care sheet gives you a general idea about how long it will take for you to recover. But each person recovers at a different pace. Follow the steps below to get better as quickly as possible. How can you care for yourself at home? Activity    · Rest when you feel tired. You may take a nap, but do not stay in bed all day. When you sit, use a chair with arms. You can use the arms to help you stand up.     · Work with your physical therapist to find the best way to exercise. You may be able to take frequent, short walks using crutches or a walker. What you can do as your knee heals will depend on whether your new knee is cemented or uncemented. You may not be able to do certain things for a while if your new knee is uncemented.     · After your knee has healed enough, you can do more strenuous activities with caution. ¨ You can golf, but use a golf cart, and do not wear shoes with spikes. ¨ You can bike on a flat road or on a stationary bike. Avoid biking up hills. ¨ Your doctor may suggest that you stay away from activities that put stress on your knee. These include tennis or badminton, squash or racquetball, contact sports like football, jumping (such as in basketball), jogging, or running. ¨ Avoid activities where you might fall. These include horseback riding, skiing, and mountain biking.     · Do not sit for more than 1 hour at a time. Get up and walk around for a while before you sit again. If you must sit for a long time, prop up your leg with a chair or footstool. This will help you avoid swelling.     · Ask your doctor when you can drive again. It may take up to 8 weeks after knee replacement surgery before it is safe for you to drive.     · When you get into a car, sit on the edge of the seat.  Then pull in your legs, and turn to face the front.     · You should be able to do many everyday activities 3 to 6 weeks after your surgery. You will probably need to take 4 to 16 weeks off from work. When you can go back to work depends on the type of work you do and how you feel.     · Ask your doctor when it is okay for you to have sex.     · Do not lift anything heavier than 10 pounds and do not lift weights for 12 weeks. Diet    · By the time you leave the hospital, you should be eating your normal diet. If your stomach is upset, try bland, low-fat foods like plain rice, broiled chicken, toast, and yogurt. Your doctor may suggest that you take iron and vitamin supplements.     · Drink plenty of fluids (unless your doctor tells you not to).   · Eat healthy foods, and watch your portion sizes. Try to stay at your ideal weight. Too much weight puts more stress on your new knee.     · You may notice that your bowel movements are not regular right after your surgery. This is common. Try to avoid constipation and straining with bowel movements. You may want to take a fiber supplement every day. If you have not had a bowel movement after a couple of days, ask your doctor about taking a mild laxative. Medicines    · Your doctor will tell you if and when you can restart your medicines. He or she will also give you instructions about taking any new medicines.     · If you take blood thinners, such as warfarin (Coumadin), clopidogrel (Plavix), or aspirin, be sure to talk to your doctor. He or she will tell you if and when to start taking those medicines again. Make sure that you understand exactly what your doctor wants you to do.     · Your doctor may give you a blood-thinning medicine to prevent blood clots. If you take a blood thinner, be sure you get instructions about how to take your medicine safely. Blood thinners can cause serious bleeding problems. This medicine could be in pill form or as a shot (injection).  If a shot is necessary, your doctor will tell you how to do this.     · Be safe with medicines. Take pain medicines exactly as directed. ¨ If the doctor gave you a prescription medicine for pain, take it as prescribed. ¨ If you are not taking a prescription pain medicine, ask your doctor if you can take an over-the-counter medicine. ¨ Plan to take your pain medicine 30 minutes before exercises. It is easier to prevent pain before it starts than to stop it once it has started.     · If you think your pain medicine is making you sick to your stomach:  ¨ Take your medicine after meals (unless your doctor has told you not to). ¨ Ask your doctor for a different pain medicine.     · If your doctor prescribed antibiotics, take them as directed. Do not stop taking them just because you feel better. You need to take the full course of antibiotics. Incision care    · If your doctor told you how to care for your cut (incision), follow your doctor's instructions. You will have a dressing over the cut. A dressing helps the incision heal and protects it. Your doctor will tell you how to take care of this.     · If you did not get instructions, follow this general advice:  ¨ If you have strips of tape on the cut the doctor made, leave the tape on for a week or until it falls off. ¨ If you have stitches or staples, your doctor will tell you when to come back to have them removed. ¨ If you have skin adhesive on the cut, leave it on until it falls off. Skin adhesive is also called glue or liquid stitches. ¨ Change the bandage every day. ¨ Wash the area daily with warm water, and pat it dry. Don't use hydrogen peroxide or alcohol. They can slow healing. ¨ You may cover the area with a gauze bandage if it oozes fluid or rubs against clothing. ¨ You may shower 24 to 48 hours after surgery. Pat the incision dry. Don't swim or take a bath for the first 2 weeks, or until your doctor tells you it is okay.    Exercise    · Your rehab program will give you a number of exercises to do to help you get back your knee's range of motion and strength. Always do them as your therapist tells you. Ice and elevation    · For pain and swelling, put ice or a cold pack on the area for 10 to 20 minutes at a time. Put a thin cloth between the ice and your skin. Other instructions    · Continue to wear your support stockings as your doctor says. These help to prevent blood clots. The length of time that you will have to wear them depends on your activity level and the amount of swelling.     · You have metal pieces in your knee. These may set off some airport metal detectors. Carry a medical alert card that says you have an artificial joint, just in case. Follow-up care is a key part of your treatment and safety. Be sure to make and go to all appointments, and call your doctor if you are having problems. It's also a good idea to know your test results and keep a list of the medicines you take. When should you call for help? Call 911 anytime you think you may need emergency care. For example, call if:    · You passed out (lost consciousness).     · You have severe trouble breathing.     · You have sudden chest pain and shortness of breath, or you cough up blood.    Call your doctor now or seek immediate medical care if:    · You have signs of infection, such as:  ¨ Increased pain, swelling, warmth, or redness. ¨ Red streaks leading from the incision. ¨ Pus draining from the incision. ¨ A fever.     · You have signs of a blood clot, such as:  ¨ Pain in your calf, back of the knee, thigh, or groin. ¨ Redness and swelling in your leg or groin.     · Your incision comes open and begins to bleed, or the bleeding increases.     · You have pain that does not get better after you take pain medicine.    Watch closely for changes in your health, and be sure to contact your doctor if:    · You do not have a bowel movement after taking a laxative.    Where can you learn more? Go to http://velia-johana.info/. Enter U599 in the search box to learn more about \"Total Knee Replacement: What to Expect at Home. \"  Current as of: November 29, 2017  Content Version: 11.7  © 7185-3702 [employer]. Care instructions adapted under license by [employer] (which disclaims liability or warranty for this information). If you have questions about a medical condition or this instruction, always ask your healthcare professional. [address] any warranty or liability for your use of this information. These are general instructions for a healthy lifestyle:    No smoking/ No tobacco products/ Avoid exposure to second hand smoke    Surgeon General's Warning:  Quitting smoking now greatly reduces serious risk to your health. Obesity, smoking, and sedentary lifestyle greatly increases your risk for illness    A healthy diet, regular physical exercise & weight monitoring are important for maintaining a healthy lifestyle    You may be retaining fluid if you have a history of heart failure or if you experience any of the following symptoms:  Weight gain of 3 pounds or more overnight or 5 pounds in a week, increased swelling in our hands or feet or shortness of breath while lying flat in bed. Please call your doctor as soon as you notice any of these symptoms; do not wait until your next office visit. Recognize signs and symptoms of STROKE:    F-face looks uneven    A-arms unable to move or move even    S-speech slurred or non-existent    T-time-call 911 as soon as signs and symptoms begin-DO NOT go       Back to bed or wait to see if you get better-TIME IS BRAIN. The discharge information has been reviewed with the patient. The patient verbalized understanding. Information obtained by :  I understand that if any problems occur once I am at home I am to contact my physician.     I understand and acknowledge receipt of the instructions indicated above.                                                                                                                                            Physician's or R.N.'s Signature                                                                  Date/Time                                                                                                                                              Patient or Representative Signature                                                          Date/Time

## 2018-09-26 NOTE — PROGRESS NOTES
Shift Assessment:    Patient resting quietly, alert and oriented, no distress noted.        Patient able to plantar and dorsi flex well.      Pain 2/10.      Dressing to surgical site on R knee clean/dry/intact.      Output: clear, yellow urine; voiding.       Spouse at bedside.      Patient encouraged to use incentive spirometer.        Fresh ice placed in iceman.        Neurovascular and peripheral vascular checks WNL.        Bed low and locked position; bed alarm activated.       Call light within reach.        Patient instructed to call for assistance, verbalizes understanding.        Nursing assessment complete.

## 2018-10-16 ENCOUNTER — HOSPITAL ENCOUNTER (OUTPATIENT)
Dept: PHYSICAL THERAPY | Age: 68
Discharge: HOME | End: 2018-10-16
Payer: MEDICARE

## 2018-10-16 PROCEDURE — G8978 MOBILITY CURRENT STATUS: HCPCS

## 2018-10-16 PROCEDURE — 97110 THERAPEUTIC EXERCISES: CPT

## 2018-10-16 PROCEDURE — 97161 PT EVAL LOW COMPLEX 20 MIN: CPT

## 2018-10-16 PROCEDURE — G8979 MOBILITY GOAL STATUS: HCPCS

## 2018-10-17 NOTE — PROGRESS NOTES
Ambulatory/Rehab Services H2 Model Falls Risk Assessment Risk Factor Pts. ·  
Confusion/Disorientation/Impulsivity  []    4 · Symptomatic Depression  []   2 · Altered Elimination  []   1 · Dizziness/Vertigo  []   1 · Gender (Male)  []   1 · Any administered antiepileptics (anticonvulsants):  []   2 · Any administered benzodiazepines:  []   1 · Visual Impairment (specify):  []   1 · Portable Oxygen Use  []   1 · Orthostatic ? BP  []   1 · History of Recent Falls (within 3 mos.)  []   5 Ability to Rise from Chair (choose one) Pts. ·  
Ability to rise in a single movement  []   0 · Pushes up, successful in one attempt  [x]   1 · Multiple attempts, but successful  []   3 · Unable to rise without assistance  []   4 Total: (5 or greater = High Risk) 1 Falls Prevention Plan: 
 []                Physical Limitations to Exercise (specify): 
 []                Mobility Assistance Device (type): 
 []                Exercise/Equipment Adaptation (specify): 
 
©2010 [facility] [address] Patent #[id number]. Federal Law prohibits the replication, distribution or use without written permission from [facility] [employer]

## 2018-10-17 NOTE — THERAPY EVALUATION
Cinda Cabrera : 1950 [place] / [place] / Product Type: Medicare /  [address] [place]  at [address] 
[address], [address] [place] [address] [address], [address] [phone]   [phone] OUTPATIENT PHYSICAL THERAPY:Initial Assessment 10/16/2018 ICD-10: Treatment Diagnosis:  
R26.2 Difficulty in walking, not elsewhere classified M25.661 Stiffness of right knee, not elsewhere classified M25.561 Pain in right knee Precautions/Allergies:  
Pcn [penicillins] Fall Risk Score: 1 (? 5 = High Risk) MD Orders: Eval and treat MEDICAL/REFERRING DIAGNOSIS: 
Presence of right artificial knee joint [Z96.651] DATE OF ONSET:  REFERRING PHYSICIAN: Irene Lora., * RETURN PHYSICIAN APPOINTMENT: 2 weeks INITIAL ASSESSMENT:  Ms. Petra Nevarez presents with decreased ROM and strength post R TKA on 18. Pt with fair gait with straight cane. Will need to work to increase ROM to normalize gait. Will need to work to increase ROM and strength to enable return to prior activity level. Pt is anxious to walk normally. Should progress well. PROBLEM LIST (Impacting functional limitations): 1. Decreased Strength 2. Decreased ADL/Functional Activities 3. Decreased Ambulation Ability/Technique 4. Decreased Balance 5. Increased Pain 6. Decreased Flexibility/Joint Mobility 7. Edema/Girth 8. Decreased [place] with Home Exercise Program INTERVENTIONS PLANNED: 
1. Gait Training 2. Home Exercise Program (HEP) 3. Manual Therapy 4. Range of Motion (ROM) 5. Therapeutic Activites 6. Therapeutic Exercise/Strengthening TREATMENT PLAN: 
Effective Dates: 10/16/2018 TO 1/15/2019 (90 days). Frequency/Duration: 2 times a week for 90and upon reassessment, will adjust frequency and duration as progress indicates. GOALS: (Goals have been discussed and agreed upon with patient.) Short-Term Functional Goals: Time Frame: 30 days 1. Establish independent HEP with no cueing to increase ROM and strength 2. Increase R knee ROM to 0-115 to increase ease of sitting and ambulation 3. Increase LE strength to enable initiation of reciprocal pattern on stairs 4. Able to ambulate 10-15 min with minimal to no increase in R knee pain . Melinda Maher Discharge Goals: Time Frame: 90 days 1. Improve score on LEFS by 9 points to enable prolonged sitting, standing and ambulation 2. Increase R knee ROM to 0-125 to normalize gait 3. Increase R LE strength 1/2 to 1 grade to enable reciprocal pattern on stairs 4. Able to ambulate 20-30 min with minimal to no increase in R knee pain Rehabilitation Potential For Stated Goals: Good Regarding Purvi Werner's therapy, I certify that the treatment plan above will be carried out by a therapist or under their direction. Thank you for this referral, Herlinda Salazar, PT Referring Physician Signature: Steph Rosenthal., *            Date The information in this section was collected on 10/16/18 (except where otherwise noted). HISTORY:  
History of Present Injury/Illness (Reason for Referral): 
Pt is post R TKA on Sept 24,2018. Pt had Home PT until 10//13/18. Pt presents with decreased ROM and strength of R knee and LE. Pt with fair gait. Pt is referred to outpatient PT for aggressive ROM and strengthening of R knee and LE. Past Medical History/Comorbidities: Ms. Deepak Vee  has a past medical history of Arthritis; Chronic pain; Environmental allergies; Hypertension; Ill-defined condition; Migraine; Psychiatric disorder; RLS (restless legs syndrome); and Rosacea. She also has no past medical history of Adverse effect of anesthesia; Difficult intubation; Malignant hyperthermia due to anesthesia; Nausea & vomiting; or Pseudocholinesterase deficiency.   Ms. Deepak Vee  has a past surgical history that includes pr abdomen surgery proc unlisted; hx appendectomy; and hx dilation and curettage. Social History/Living Environment:  
  Lives with spouse in 2 story home Prior Level of Function/Work/Activity: 
Retired. Cares for home. Wants to walk normally and without pain Dominant Side:  
      RIGHT Current Medications:   
  
Current Outpatient Prescriptions:  
  aspirin delayed-release 81 mg tablet, Take 1 Tab by mouth every twelve (12) hours every twelve (12) hours for 35 days. , Disp: 70 Tab, Rfl: 0 
  HYDROmorphone (DILAUDID) 2 mg tablet, Take 1 Tab by mouth every four (4) hours as needed. Max Daily Amount: 12 mg., Disp: 40 Tab, Rfl: 0 
  FLUoxetine (PROZAC) 10 mg capsule, Take 10 mg by mouth daily. , Disp: , Rfl:  
  gabapentin (NEURONTIN) 300 mg capsule, Take 300 mg by mouth nightly., Disp: , Rfl:  
  cetirizine (ZYRTEC) 10 mg tablet, Take  by mouth nightly., Disp: , Rfl:  
  rOPINIRole (REQUIP) 1 mg tablet, Take 1 mg by mouth nightly. Indications: Restless Legs Syndrome, Disp: , Rfl:  
  fluticasone (FLONASE) 50 mcg/actuation nasal spray, 2 Sprays by Both Nostrils route as needed for Rhinitis., Disp: , Rfl:  
  metroNIDAZOLE (METROCREAM) 0.75 % topical cream, Apply  to affected area nightly. Use a thin layer to affected areas after washing, Disp: , Rfl:  
  verapamil ER (CALAN-SR) 120 mg tablet, Take 120 mg by mouth daily. , Disp: , Rfl:   
Date Last Reviewed:  10/16/18 Number of Personal Factors/Comorbidities that affect the Plan of Care: 1-2: MODERATE COMPLEXITY EXAMINATION:  
Observation/Orthostatic Postural Assessment: To PT with straight cane. PT with slow gait and short step length. Limp to R. Pt with decreased knee ext at heel strike and stance. Tends to ambulate with flexed knee. Decreased toe off with decreased knee flexion. Pt with moderate edema. Pt with tight HS. In 90/90, HS are 55. Palpation:   
      Tender medial knee and back of knee Functional Mobility:  
      Gait/Ambulation:  Independent with straight cane. Stairs:  Difficulty with stairs ROM:  
  R knee -5 to 100 L knee 0-130 Strength:  
  Knee ext  R 3+/5,  L 4/5 Knee flexion  R 4-/5, L 4/5 Hip flexion R 4-/5,  L 4/5 Hip abd  R 3+/5,  L 4-/5 Special Tests:  
Neurological Screen: Intact to light touch Balance:  Fair Body Structures Involved: 1. Bones 2. Joints 3. Muscles 4. Ligaments Body Functions Affected: 1. Sensory/Pain 2. Neuromusculoskeletal 
3. Movement Related Activities and Participation Affected: 1. Learning and Applying Knowledge 2. General Tasks and Demands 3. Mobility 4. Domestic Life 5. Community, Social and [place] Number of elements (examined above) that affect the Plan of Care: 3: MODERATE COMPLEXITY CLINICAL PRESENTATION:  
Presentation: Evolving clinical presentation with changing clinical characteristics: MODERATE COMPLEXITY CLINICAL DECISION MAKING:  
Outcome Measure: Tool Used: Lower Extremity Functional Scale (LEFS) Score:  Initial: 19/80 Most Recent: X/80 (Date: -- ) Interpretation of Score: 20 questions each scored on a 5 point scale with 0 representing \"extreme difficulty or unable to perform\" and 4 representing \"no difficulty\". The lower the score, the greater the functional disability. 80/80 represents no disability. Minimal detectable change is 9 points. Score 80 79-63 62-48 47-32 31-16 15-1 0 Modifier CH CI CJ CK CL CM CN  
 
? Mobility - Walking and Moving Around:  
  - CURRENT STATUS: CL - 60%-79% impaired, limited or restricted  - GOAL STATUS: CK - 40%-59% impaired, limited or restricted  - D/C STATUS:  ---------------To be determined--------------- Medical Necessity:  
· Patient is expected to demonstrate progress in strength, range of motion and gait and pain  to increase independence with home and community activities . Reason for Services/Other Comments: 
· Patient continues to require skilled intervention due to decreased ROM and strength of R knee and LE. Fair gait . Use of outcome tool(s) and clinical judgement create a POC that gives a: Clear prediction of patient's progress: LOW COMPLEXITY  
  
 
 
 
TREATMENT:  
(In addition to Assessment/Re-Assessment sessions the following treatments were rendered) Pre-treatment Symptoms/Complaints:  Pain and stiffness of R knee. Weakness of LE's. Fair gait Pain: Initial:  
Pain Intensity 1: 7  Post Session:  6 THERAPEUTIC EXERCISE: (15 minutes):  Exercises per grid below to improve mobility, strength and gait. Required moderate verbal and manual cues to promote proper body alignment, promote proper body posture and promote proper body mechanics. Progressed resistance, range and repetitions as indicated. Instructed in exercise program of QS (quadriceps sets), SLR (straight leg raises), hip abd, SAQ's (short arc quadriceps), and LAQ's (long arc quadriceps). Instructed in HS and HC stretch. Instructed in 6 in step up x 20. Worked on heel toe gait with straight cane emphasizing increased knee ext at heel strike and stance. Evaluation (  xx   ): 
 
Manual Therapy (      ): Patella and patella tendon mobs. Soft tissue work to [employer] and HS. PROM for knee flex and extn. Therapeutic Modalities: HEP: As above; handouts given to patient for all exercises. [employer] 
 
Treatment/Session Assessment:   Pt is post R TKA on Sept 24,2018. Pt presents with decreased ROM and strength. Pt with fair gait with straight cane. Will need to work to increase ROM to normalize gait. Will need to work to increase ROM and strength to enable return to prior activity level. Pt is anxious to walk normally. Should progress well. · Response to Treatment:  Understands exercises . · Compliance with Program/Exercises: Will assess as treatment progresses. · Recommendations/Intent for next treatment session: \"Next visit will focus on ROM and strengthening of R knee and LE. Gait training. \". Total Treatment Duration: PT Patient Time In/Time Out Time In: 0900 Time Out: 1000 Treatment number  1 Shawn Marcano PT

## 2018-10-19 ENCOUNTER — HOSPITAL ENCOUNTER (OUTPATIENT)
Dept: PHYSICAL THERAPY | Age: 68
Discharge: HOME | End: 2018-10-19
Payer: MEDICARE

## 2018-10-19 PROCEDURE — 97110 THERAPEUTIC EXERCISES: CPT

## 2018-10-19 PROCEDURE — 97140 MANUAL THERAPY 1/> REGIONS: CPT

## 2018-10-19 NOTE — PROGRESS NOTES
Katy Favors : 1950 [place] / Product Type: Medicare /  [address]  at [address] 
[address], [address] [place], [place] [address]en, [address] [phone]   [phone] OUTPATIENT PHYSICAL THERAPY:Daily Note 10/19/2018 ICD-10: Treatment Diagnosis:  
R26.2 Difficulty in walking, not elsewhere classified M25.661 Stiffness of right knee, not elsewhere classified M25.561 Pain in right knee Precautions/Allergies:  
Pcn [penicillins] Fall Risk Score: 1 (? 5 = High Risk) MD Orders: Eval and treat MEDICAL/REFERRING DIAGNOSIS: 
Presence of right artificial knee joint [Z96.651] DATE OF ONSET:  REFERRING PHYSICIAN: Emily Vasques, * RETURN PHYSICIAN APPOINTMENT: 2 weeks INITIAL ASSESSMENT:  Ms. Cathalene Mcardle presents with decreased ROM and strength post R TKA on 18. Pt with fair gait with straight cane. Will need to work to increase ROM to normalize gait. Will need to work to increase ROM and strength to enable return to prior activity level. Pt is anxious to walk normally. Should progress well. PROBLEM LIST (Impacting functional limitations): 1. Decreased Strength 2. Decreased ADL/Functional Activities 3. Decreased Ambulation Ability/Technique 4. Decreased Balance 5. Increased Pain 6. Decreased Flexibility/Joint Mobility 7. Edema/Girth 8. Decreased [place] with Home Exercise Program INTERVENTIONS PLANNED: 
1. Gait Training 2. Home Exercise Program (HEP) 3. Manual Therapy 4. Range of Motion (ROM) 5. Therapeutic Activites 6. Therapeutic Exercise/Strengthening TREATMENT PLAN: 
Effective Dates: 10/16/2018 TO 1/15/2019 (90 days). Frequency/Duration: 2 times a week for 90and upon reassessment, will adjust frequency and duration as progress indicates. GOALS: (Goals have been discussed and agreed upon with patient.) Short-Term Functional Goals: Time Frame: 30 days 1. Establish independent HEP with no cueing to increase ROM and strength 2. Increase R knee ROM to 0-115 to increase ease of sitting and ambulation 3. Increase LE strength to enable initiation of reciprocal pattern on stairs 4. Able to ambulate 10-15 min with minimal to no increase in R knee pain . Sue [facility] Discharge Goals: Time Frame: 90 days 1. Improve score on LEFS by 9 points to enable prolonged sitting, standing and ambulation 2. Increase R knee ROM to 0-125 to normalize gait 3. Increase R LE strength 1/2 to 1 grade to enable reciprocal pattern on stairs 4. Able to ambulate 20-30 min with minimal to no increase in R knee pain Rehabilitation Potential For Stated Goals: Good The information in this section was collected on 10/16/18 (except where otherwise noted). HISTORY:  
History of Present Injury/Illness (Reason for Referral): 
Pt is post R TKA on Sept 24,2018. Pt had Home PT until 10//13/18. Pt presents with decreased ROM and strength of R knee and LE. Pt with fair gait. Pt is referred to outpatient PT for aggressive ROM and strengthening of R knee and LE. Past Medical History/Comorbidities: Ms. Juan Sanchez  has a past medical history of Arthritis, Chronic pain, Environmental allergies, Hypertension, Ill-defined condition, Migraine, Psychiatric disorder, RLS (restless legs syndrome), and Rosacea. Ms. Juan Sanchez  has a past surgical history that includes pr abdomen surgery proc unlisted; hx appendectomy; hx dilation and curettage; and KNEE ARTHROPLASTY TOTAL/ RIGHT/ (Right, 9/24/2018). Social History/Living Environment:  
  Lives with spouse in 2 story home Prior Level of Function/Work/Activity: 
Retired. Cares for home. Wants to walk normally and without pain Dominant Side:  
      RIGHT Current Medications:   
  
Current Outpatient Medications:  
  aspirin delayed-release 81 mg tablet, Take 1 Tab by mouth every twelve (12) hours every twelve (12) hours for 35 days. , Disp: 70 Tab, Rfl: 0 
  HYDROmorphone (DILAUDID) 2 mg tablet, Take 1 Tab by mouth every four (4) hours as needed. Max Daily Amount: 12 mg., Disp: 40 Tab, Rfl: 0 
  FLUoxetine (PROZAC) 10 mg capsule, Take 10 mg by mouth daily. , Disp: , Rfl:  
  gabapentin (NEURONTIN) 300 mg capsule, Take 300 mg by mouth nightly., Disp: , Rfl:  
  cetirizine (ZYRTEC) 10 mg tablet, Take  by mouth nightly., Disp: , Rfl:  
  rOPINIRole (REQUIP) 1 mg tablet, Take 1 mg by mouth nightly. Indications: Restless Legs Syndrome, Disp: , Rfl:  
  fluticasone (FLONASE) 50 mcg/actuation nasal spray, 2 Sprays by Both Nostrils route as needed for Rhinitis., Disp: , Rfl:  
  metroNIDAZOLE (METROCREAM) 0.75 % topical cream, Apply  to affected area nightly. Use a thin layer to affected areas after washing, Disp: , Rfl:  
  verapamil ER (CALAN-SR) 120 mg tablet, Take 120 mg by mouth daily. , Disp: , Rfl:   
Tylenol for pain at times Date Last Reviewed:  10/16/18 Number of Personal Factors/Comorbidities that affect the Plan of Care: 1-2: MODERATE COMPLEXITY EXAMINATION:  
Observation/Orthostatic Postural Assessment: To PT with straight cane. PT with slow gait and short step length. Limp to R. Pt with decreased knee ext at heel strike and stance. Tends to ambulate with flexed knee. Decreased toe off with decreased knee flexion. Pt with moderate edema. Pt with tight HS. In 90/90, HS are 55. Palpation:   
      Tender medial knee and back of knee Functional Mobility:  
      Gait/Ambulation:  Independent with straight cane. Stairs:  Difficulty with stairs ROM:  
  R knee -5 to 100 L knee 0-130 hyperext 5 Strength:  
  Knee ext  R 3+/5,  L 4/5 Knee flexion  R 4-/5, L 4/5 Hip flexion R 4-/5,  L 4/5 Hip abd  R 3+/5,  L 4-/5 Special Tests:  
Neurological Screen: Intact to light touch Balance:  Fair Body Structures Involved: 1. Bones 2. Joints 3. Muscles 4. Ligaments Body Functions Affected: 1. Sensory/Pain 2. Neuromusculoskeletal 
3. Movement Related Activities and Participation Affected: 1. Learning and Applying Knowledge 2. General Tasks and Demands 3. Mobility 4. Domestic Life 5. Community, Social and [place] Number of elements (examined above) that affect the Plan of Care: 3: MODERATE COMPLEXITY CLINICAL PRESENTATION:  
Presentation: Evolving clinical presentation with changing clinical characteristics: MODERATE COMPLEXITY CLINICAL DECISION MAKING:  
Outcome Measure: Tool Used: Lower Extremity Functional Scale (LEFS) Score:  Initial: 19/80 Most Recent: X/80 (Date: -- ) Interpretation of Score: 20 questions each scored on a 5 point scale with 0 representing \"extreme difficulty or unable to perform\" and 4 representing \"no difficulty\". The lower the score, the greater the functional disability. 80/80 represents no disability. Minimal detectable change is 9 points. Score 80 79-63 62-48 47-32 31-16 15-1 0 Modifier CH CI CJ CK CL CM CN  
 
? Mobility - Walking and Moving Around:  
  - CURRENT STATUS: CL - 60%-79% impaired, limited or restricted  - GOAL STATUS: CK - 40%-59% impaired, limited or restricted  - D/C STATUS:  ---------------To be determined--------------- Medical Necessity:  
· Patient is expected to demonstrate progress in strength, range of motion and gait and pain  to increase independence with home and community activities . Reason for Services/Other Comments: 
· Patient continues to require skilled intervention due to decreased ROM and strength of R knee and LE. Fair gait . Use of outcome tool(s) and clinical judgement create a POC that gives a: Clear prediction of patient's progress: LOW COMPLEXITY  
  
 
 
 
TREATMENT:  
(In addition to Assessment/Re-Assessment sessions the following treatments were rendered) Pre-treatment Symptoms/Complaints:  R knee is sore. Not confident in gait with cane but improving. May need to have R shoulder surgery currently scheduled for ealry Dec.  
Pain: Initial:  
Pain Intensity 1: 5  Post Session:  4  
 
THERAPEUTIC EXERCISE: (30 minutes):  Exercises per grid below to improve mobility, strength and gait. Required moderate verbal and manual cues to promote proper body alignment, promote proper body posture and promote proper body mechanics. Progressed resistance, range and repetitions as indicated. Reviewed  exercise program of QS (quadriceps sets), SAQ's (short arc quadriceps), and LAQ's (long arc quadriceps) x 20. Seated HSC at 15 # x 3 sets of 10. Worked on cable for hip flex and abd at 17# x 2 sets of 15. HS and HC stretch. Worked on 6 in step up x 10 then instructed in step up and over x 10. .  Worked on heel toe gait with straight cane emphasizing increased knee ext at heel strike and stance. Improving gait . Manual Therapy ( 30 min  ): Patella and patella tendon mobs. Soft tissue work to [employer] and HS. PROM for knee flex and extn. Sitting knee flexion to 120. Full passive ext after stretching. Therapeutic Modalities: HEP: As above; handouts given to patient for all exercises. [employer] Portal 
 
Treatment/Session Assessment:   Pt is post R TKA on Sept 24,2018. Pt presents with decreased ROM and strength. Pt with improved gait with straight cane. Will work to progress to no assistive device. Steady increase in ROM. Pain is limiting factor. .  Will need to work to increase ROM to normalize gait and increase confidence in gait. .   Working to increase ROM and strength to enable return to prior activity level. Pt is anxious to walk normally. Should progress well. · Response to Treatment:  Understands exercises . · Compliance with Program/Exercises: doing exercises . · Recommendations/Intent for next treatment session:  \"Next visit will focus on ROM and strengthening of R knee and LE. Gait training. \". Total Treatment Duration: PT Patient Time In/Time Out Time In: 1100 Time Out: 1200 Treatment number  1 Charbel Man, PT

## 2018-10-22 ENCOUNTER — HOSPITAL ENCOUNTER (OUTPATIENT)
Dept: PHYSICAL THERAPY | Age: 68
Discharge: HOME | End: 2018-10-22
Payer: MEDICARE

## 2018-10-22 PROCEDURE — 97140 MANUAL THERAPY 1/> REGIONS: CPT

## 2018-10-22 PROCEDURE — 97110 THERAPEUTIC EXERCISES: CPT

## 2018-10-23 NOTE — PROGRESS NOTES
Denzel Ruvalcaba : 1950 [place] / [place] / Product Type: Medicare /  [address]  at [address] 
[address], [address] [place], [place] [address]en, [address] [phone]   [phone] OUTPATIENT PHYSICAL THERAPY:Daily Note 10/22/2018 ICD-10: Treatment Diagnosis:  
R26.2 Difficulty in walking, not elsewhere classified M25.661 Stiffness of right knee, not elsewhere classified M25.561 Pain in right knee Precautions/Allergies:  
Pcn [penicillins] Fall Risk Score: 1 (? 5 = High Risk) MD Orders: Eval and treat MEDICAL/REFERRING DIAGNOSIS: 
Presence of right artificial knee joint [Z96.651] DATE OF ONSET:  REFERRING PHYSICIAN: Roselia Perez, * RETURN PHYSICIAN APPOINTMENT: 2 weeks INITIAL ASSESSMENT:  Ms. Jackeline Mccain presents with decreased ROM and strength post R TKA on 18. Pt with fair gait with straight cane. Will need to work to increase ROM to normalize gait. Will need to work to increase ROM and strength to enable return to prior activity level. Pt is anxious to walk normally. Should progress well. PROBLEM LIST (Impacting functional limitations): 1. Decreased Strength 2. Decreased ADL/Functional Activities 3. Decreased Ambulation Ability/Technique 4. Decreased Balance 5. Increased Pain 6. Decreased Flexibility/Joint Mobility 7. Edema/Girth 8. Decreased [place] with Home Exercise Program INTERVENTIONS PLANNED: 
1. Gait Training 2. Home Exercise Program (HEP) 3. Manual Therapy 4. Range of Motion (ROM) 5. Therapeutic Activites 6. Therapeutic Exercise/Strengthening TREATMENT PLAN: 
Effective Dates: 10/16/2018 TO 1/15/2019 (90 days). Frequency/Duration: 2 times a week for 90and upon reassessment, will adjust frequency and duration as progress indicates. GOALS: (Goals have been discussed and agreed upon with patient.) Short-Term Functional Goals: Time Frame: 30 days 1. Establish independent HEP with no cueing to increase ROM and strength 2. Increase R knee ROM to 0-115 to increase ease of sitting and ambulation 3. Increase LE strength to enable initiation of reciprocal pattern on stairs 4. Able to ambulate 10-15 min with minimal to no increase in R knee pain . Mine Walsh Discharge Goals: Time Frame: 90 days 1. Improve score on LEFS by 9 points to enable prolonged sitting, standing and ambulation 2. Increase R knee ROM to 0-125 to normalize gait 3. Increase R LE strength 1/2 to 1 grade to enable reciprocal pattern on stairs 4. Able to ambulate 20-30 min with minimal to no increase in R knee pain Rehabilitation Potential For Stated Goals: Good The information in this section was collected on 10/16/18 (except where otherwise noted). HISTORY:  
History of Present Injury/Illness (Reason for Referral): 
Pt is post R TKA on Sept 24,2018. Pt had Home PT until 10//13/18. Pt presents with decreased ROM and strength of R knee and LE. Pt with fair gait. Pt is referred to outpatient PT for aggressive ROM and strengthening of R knee and LE. Past Medical History/Comorbidities: Ms. Og Paul  has a past medical history of Arthritis, Chronic pain, Environmental allergies, Hypertension, Ill-defined condition, Migraine, Psychiatric disorder, RLS (restless legs syndrome), and Rosacea. Ms. Og Paul  has a past surgical history that includes pr abdomen surgery proc unlisted; hx appendectomy; hx dilation and curettage; and KNEE ARTHROPLASTY TOTAL/ RIGHT/ (Right, 9/24/2018). Social History/Living Environment:  
  Lives with spouse in 2 story home Prior Level of Function/Work/Activity: 
Retired. Cares for home. Wants to walk normally and without pain Dominant Side:  
      RIGHT Current Medications:   
  
Current Outpatient Medications:  
  aspirin delayed-release 81 mg tablet, Take 1 Tab by mouth every twelve (12) hours every twelve (12) hours for 35 days. , Disp: 70 Tab, Rfl: 0 
  HYDROmorphone (DILAUDID) 2 mg tablet, Take 1 Tab by mouth every four (4) hours as needed. Max Daily Amount: 12 mg., Disp: 40 Tab, Rfl: 0 
  FLUoxetine (PROZAC) 10 mg capsule, Take 10 mg by mouth daily. , Disp: , Rfl:  
  gabapentin (NEURONTIN) 300 mg capsule, Take 300 mg by mouth nightly., Disp: , Rfl:  
  cetirizine (ZYRTEC) 10 mg tablet, Take  by mouth nightly., Disp: , Rfl:  
  rOPINIRole (REQUIP) 1 mg tablet, Take 1 mg by mouth nightly. Indications: Restless Legs Syndrome, Disp: , Rfl:  
  fluticasone (FLONASE) 50 mcg/actuation nasal spray, 2 Sprays by Both Nostrils route as needed for Rhinitis., Disp: , Rfl:  
  metroNIDAZOLE (METROCREAM) 0.75 % topical cream, Apply  to affected area nightly. Use a thin layer to affected areas after washing, Disp: , Rfl:  
  verapamil ER (CALAN-SR) 120 mg tablet, Take 120 mg by mouth daily. , Disp: , Rfl:   
Tylenol for pain at times Date Last Reviewed:  10/22/2018 Number of Personal Factors/Comorbidities that affect the Plan of Care: 1-2: MODERATE COMPLEXITY EXAMINATION:  
Observation/Orthostatic Postural Assessment: To PT with straight cane. PT with slow gait and short step length. Limp to R. Pt with decreased knee ext at heel strike and stance. Tends to ambulate with flexed knee. Decreased toe off with decreased knee flexion. Pt with moderate edema. Pt with tight HS. In 90/90, HS are 55. Palpation:   
      Tender medial knee and back of knee Functional Mobility:  
      Gait/Ambulation:  Independent with straight cane. Stairs:  Difficulty with stairs ROM:  
  R knee -5 to 100 L knee 0-130 hyperext 5 Strength:  
  Knee ext  R 3+/5,  L 4/5 Knee flexion  R 4-/5, L 4/5 Hip flexion R 4-/5,  L 4/5 Hip abd  R 3+/5,  L 4-/5 Special Tests:  
Neurological Screen: Intact to light touch Balance:  Fair Body Structures Involved: 1. Bones 2. Joints 3. Muscles 4. Ligaments Body Functions Affected: 1. Sensory/Pain 2. Neuromusculoskeletal 
3. Movement Related Activities and Participation Affected: 1. Learning and Applying Knowledge 2. General Tasks and Demands 3. Mobility 4. Domestic Life 5. Community, Social and [place] Number of elements (examined above) that affect the Plan of Care: 3: MODERATE COMPLEXITY CLINICAL PRESENTATION:  
Presentation: Evolving clinical presentation with changing clinical characteristics: MODERATE COMPLEXITY CLINICAL DECISION MAKING:  
Outcome Measure: Tool Used: Lower Extremity Functional Scale (LEFS) Score:  Initial: 19/80 Most Recent: X/80 (Date: -- ) Interpretation of Score: 20 questions each scored on a 5 point scale with 0 representing \"extreme difficulty or unable to perform\" and 4 representing \"no difficulty\". The lower the score, the greater the functional disability. 80/80 represents no disability. Minimal detectable change is 9 points. Score 80 79-63 62-48 47-32 31-16 15-1 0 Modifier CH CI CJ CK CL CM CN  
 
? Mobility - Walking and Moving Around:  
  - CURRENT STATUS: CL - 60%-79% impaired, limited or restricted  - GOAL STATUS: CK - 40%-59% impaired, limited or restricted  - D/C STATUS:  ---------------To be determined--------------- Medical Necessity:  
· Patient is expected to demonstrate progress in strength, range of motion and gait and pain  to increase independence with home and community activities . Reason for Services/Other Comments: 
· Patient continues to require skilled intervention due to decreased ROM and strength of R knee and LE. Fair gait . Use of outcome tool(s) and clinical judgement create a POC that gives a: Clear prediction of patient's progress: LOW COMPLEXITY  
  
 
 
 
TREATMENT:  
(In addition to Assessment/Re-Assessment sessions the following treatments were rendered) Pre-treatment Symptoms/Complaints:  R knee is sore but I think I am walking better. Still feels tight. . Probable R shoulder surgery currently scheduled for ealry Dec.  
Pain: Initial:  
Pain Intensity 1: 5  Post Session:  4  
 
THERAPEUTIC EXERCISE: (30 minutes):  Exercises per grid below to improve mobility, strength and gait. Required moderate verbal and manual cues to promote proper body alignment, promote proper body posture and promote proper body mechanics. Progressed resistance, range and repetitions as indicated. Performed  exercise program of QS (quadriceps sets), SAQ's (short arc quadriceps), and LAQ's (long arc quadriceps) x 20. Seated HSC at 15 # x 4 sets of 10. Worked on cable for hip flex and abd at 17# and hip ext at 25# x 2 sets of 15. HS and HC stretch. Worked on 6 in step up x 10 then  step up and over x 15. .  Worked on heel toe gait with straight cane emphasizing increased knee ext at heel strike and stance. Improving gait . NuStep x 8 min at level  3. Manual Therapy ( 30 min  ): Patella and patella tendon mobs. Soft tissue work to [employer] and HS. PROM for knee flex and extn. Sitting knee flexion to 122. Full passive ext after stretching. Therapeutic Modalities: HEP: As above; handouts given to patient for all exercises. [employer] [facility] 
 
Treatment/Session Assessment:   Pt is post R TKA on Sept 24,2018. Pt presents with decreased ROM and strength. Pt with improved gait with straight cane and progressing to no assistive device. . Low but stteady increase in ROM. Pain is limiting factor. .  Working to increase ROM to normalize gait and increase confidence in gait. .   Working to increase ROM and strength to enable return to prior activity level. Pt is anxious to walk normally. Should progress well. · Response to Treatment:  Understands exercises . · Compliance with Program/Exercises: doing exercises . · Recommendations/Intent for next treatment session: \"Next visit will focus on ROM and strengthening of R knee and LE. Gait training. \". Total Treatment Duration: PT Patient Time In/Time Out Time In: 0900 Time Out: 1000 Treatment number  3 Sabra Bhatt, PT

## 2018-10-24 ENCOUNTER — HOSPITAL ENCOUNTER (OUTPATIENT)
Dept: PHYSICAL THERAPY | Age: 68
Discharge: HOME | End: 2018-10-24
Payer: MEDICARE

## 2018-10-24 PROCEDURE — 97110 THERAPEUTIC EXERCISES: CPT

## 2018-10-24 PROCEDURE — 97140 MANUAL THERAPY 1/> REGIONS: CPT

## 2018-10-24 NOTE — PROGRESS NOTES
Christelle Jeramy : 1950 [place] / Product Type: Medicare /  [address]  at [address] 
[address], [address] [place] [address] Jason, [address] [phone]   [phone] OUTPATIENT PHYSICAL THERAPY:Daily Note 10/24/2018 ICD-10: Treatment Diagnosis:  
R26.2 Difficulty in walking, not elsewhere classified M25.661 Stiffness of right knee, not elsewhere classified M25.561 Pain in right knee Precautions/Allergies:  
Pcn [penicillins] Fall Risk Score: 1 (? 5 = High Risk) MD Orders: Eval and treat MEDICAL/REFERRING DIAGNOSIS: 
Presence of right artificial knee joint [Z96.651] DATE OF ONSET:  REFERRING PHYSICIAN: Sridevi Rothman., * RETURN PHYSICIAN APPOINTMENT: 2 weeks INITIAL ASSESSMENT:  Ms. Alexis De Los Santos presents with decreased ROM and strength post R TKA on 18. Pt with fair gait with straight cane. Will need to work to increase ROM to normalize gait. Will need to work to increase ROM and strength to enable return to prior activity level. Pt is anxious to walk normally. Should progress well. PROBLEM LIST (Impacting functional limitations): 1. Decreased Strength 2. Decreased ADL/Functional Activities 3. Decreased Ambulation Ability/Technique 4. Decreased Balance 5. Increased Pain 6. Decreased Flexibility/Joint Mobility 7. Edema/Girth 8. Decreased [place] with Home Exercise Program INTERVENTIONS PLANNED: 
1. Gait Training 2. Home Exercise Program (HEP) 3. Manual Therapy 4. Range of Motion (ROM) 5. Therapeutic Activites 6. Therapeutic Exercise/Strengthening TREATMENT PLAN: 
Effective Dates: 10/16/2018 TO 1/15/2019 (90 days). Frequency/Duration: 2 times a week for 90and upon reassessment, will adjust frequency and duration as progress indicates. GOALS: (Goals have been discussed and agreed upon with patient.) Short-Term Functional Goals: Time Frame: 30 days 1. Establish independent HEP with no cueing to increase ROM and strength 2. Increase R knee ROM to 0-115 to increase ease of sitting and ambulation 3. Increase LE strength to enable initiation of reciprocal pattern on stairs 4. Able to ambulate 10-15 min with minimal to no increase in R knee pain . Mine Walsh Discharge Goals: Time Frame: 90 days 1. Improve score on LEFS by 9 points to enable prolonged sitting, standing and ambulation 2. Increase R knee ROM to 0-125 to normalize gait 3. Increase R LE strength 1/2 to 1 grade to enable reciprocal pattern on stairs 4. Able to ambulate 20-30 min with minimal to no increase in R knee pain Rehabilitation Potential For Stated Goals: Good The information in this section was collected on 10/16/18 (except where otherwise noted). HISTORY:  
History of Present Injury/Illness (Reason for Referral): 
Pt is post R TKA on Sept 24,2018. Pt had Home PT until 10//13/18. Pt presents with decreased ROM and strength of R knee and LE. Pt with fair gait. Pt is referred to outpatient PT for aggressive ROM and strengthening of R knee and LE. Past Medical History/Comorbidities: Ms. Og Paul  has a past medical history of Arthritis, Chronic pain, Environmental allergies, Hypertension, Ill-defined condition, Migraine, Psychiatric disorder, RLS (restless legs syndrome), and Rosacea. Ms. Og Paul  has a past surgical history that includes pr abdomen surgery proc unlisted; hx appendectomy; hx dilation and curettage; and KNEE ARTHROPLASTY TOTAL/ RIGHT/ (Right, 9/24/2018). Social History/Living Environment:  
  Lives with spouse in 2 story home Prior Level of Function/Work/Activity: 
Retired. Cares for home. Wants to walk normally and without pain Dominant Side:  
      RIGHT Current Medications:   
  
Current Outpatient Medications:  
  aspirin delayed-release 81 mg tablet, Take 1 Tab by mouth every twelve (12) hours every twelve (12) hours for 35 days. , Disp: 70 Tab, Rfl: 0 
  HYDROmorphone (DILAUDID) 2 mg tablet, Take 1 Tab by mouth every four (4) hours as needed. Max Daily Amount: 12 mg., Disp: 40 Tab, Rfl: 0 
  FLUoxetine (PROZAC) 10 mg capsule, Take 10 mg by mouth daily. , Disp: , Rfl:  
  gabapentin (NEURONTIN) 300 mg capsule, Take 300 mg by mouth nightly., Disp: , Rfl:  
  cetirizine (ZYRTEC) 10 mg tablet, Take  by mouth nightly., Disp: , Rfl:  
  rOPINIRole (REQUIP) 1 mg tablet, Take 1 mg by mouth nightly. Indications: Restless Legs Syndrome, Disp: , Rfl:  
  fluticasone (FLONASE) 50 mcg/actuation nasal spray, 2 Sprays by Both Nostrils route as needed for Rhinitis., Disp: , Rfl:  
  metroNIDAZOLE (METROCREAM) 0.75 % topical cream, Apply  to affected area nightly. Use a thin layer to affected areas after washing, Disp: , Rfl:  
  verapamil ER (CALAN-SR) 120 mg tablet, Take 120 mg by mouth daily. , Disp: , Rfl:   
Tylenol for pain at times Date Last Reviewed:  10/24/2018 Number of Personal Factors/Comorbidities that affect the Plan of Care: 1-2: MODERATE COMPLEXITY EXAMINATION:  
Observation/Orthostatic Postural Assessment: To PT with straight cane. PT with slow gait and short step length. Limp to R. Pt with decreased knee ext at heel strike and stance. Tends to ambulate with flexed knee. Decreased toe off with decreased knee flexion. Pt with moderate edema. Pt with tight HS. In 90/90, HS are 55. Palpation:   
      Tender medial knee and back of knee Functional Mobility:  
      Gait/Ambulation:  Independent with straight cane. Stairs:  Difficulty with stairs ROM:  
  R knee -5 to 100 L knee 0-130 hyperext 5 Strength:  
  Knee ext  R 3+/5,  L 4/5 Knee flexion  R 4-/5, L 4/5 Hip flexion R 4-/5,  L 4/5 Hip abd  R 3+/5,  L 4-/5 Special Tests:  
Neurological Screen: Intact to light touch Balance:  Fair Body Structures Involved: 1. Bones 2. Joints 3. Muscles 4. Ligaments Body Functions Affected: 1. Sensory/Pain 2. Neuromusculoskeletal 
3. Movement Related Activities and Participation Affected: 1. Learning and Applying Knowledge 2. General Tasks and Demands 3. Mobility 4. Domestic Life 5. Community, Social and [place] Number of elements (examined above) that affect the Plan of Care: 3: MODERATE COMPLEXITY CLINICAL PRESENTATION:  
Presentation: Evolving clinical presentation with changing clinical characteristics: MODERATE COMPLEXITY CLINICAL DECISION MAKING:  
Outcome Measure: Tool Used: Lower Extremity Functional Scale (LEFS) Score:  Initial: 19/80 Most Recent: X/80 (Date: -- ) Interpretation of Score: 20 questions each scored on a 5 point scale with 0 representing \"extreme difficulty or unable to perform\" and 4 representing \"no difficulty\". The lower the score, the greater the functional disability. 80/80 represents no disability. Minimal detectable change is 9 points. Score 80 79-63 62-48 47-32 31-16 15-1 0 Modifier CH CI CJ CK CL CM CN  
 
? Mobility - Walking and Moving Around:  
  - CURRENT STATUS: CL - 60%-79% impaired, limited or restricted  - GOAL STATUS: CK - 40%-59% impaired, limited or restricted  - D/C STATUS:  ---------------To be determined--------------- Medical Necessity:  
· Patient is expected to demonstrate progress in strength, range of motion and gait and pain  to increase independence with home and community activities . Reason for Services/Other Comments: 
· Patient continues to require skilled intervention due to decreased ROM and strength of R knee and LE. Fair gait . Use of outcome tool(s) and clinical judgement create a POC that gives a: Clear prediction of patient's progress: LOW COMPLEXITY  
  
 
 
 
TREATMENT:  
(In addition to Assessment/Re-Assessment sessions the following treatments were rendered) Pre-treatment Symptoms/Complaints:  R knee is sore but I think I am walking better. Tight in AM and after sitting. .  
Pain: Initial:  
Pain Intensity 1: 4  Post Session:  4  
 
THERAPEUTIC EXERCISE: (30 minutes):  Exercises per grid below to improve mobility, strength and gait. Required moderate verbal and manual cues to promote proper body alignment, promote proper body posture and promote proper body mechanics. Progressed resistance, range and repetitions as indicated. Performed  exercise program of QS (quadriceps sets), SAQ's (short arc quadriceps), and LAQ's (long arc quadriceps) x 20. Seated HSC at 15 # x 4 sets of 10. Worked on cable for hip flex and abd at 17# and hip ext at 25# x 2 sets of 15. HS and HC stretch. Worked on 6 in step up x 10 then  step up and over x 15. .  Worked on heel toe gait with straight cane emphasizing increased knee ext at heel strike and stance. Improving gait . NuStep x 8 min at level  3. Manual Therapy ( 30 min  ): Patella and patella tendon mobs. Soft tissue work to [employer] and HS. PROM for knee flex and extn. Sitting knee flexion to 122. Full passive ext after stretching. Therapeutic Modalities: HEP: As above; handouts given to patient for all exercises. [email or web address] Portal 
 
Treatment/Session Assessment:   Pt is post R TKA on Sept 24,2018. Pt presents with decreased ROM and strength. Pt with improved gait with straight cane. Progressing to no assistive device. Not using cane in home. .Slow but steady increase in ROM. Good ext. Work on end range flexion. Pain is limiting factor. .  Working to increase ROM to normalize gait and increase confidence in gait. Confidence is improving. .   Working to increase ROM and strength to enable return to prior activity level. Pt is anxious to walk normally. Should progress well. · Response to Treatment:  Understands exercises . · Compliance with Program/Exercises: doing exercises . · Recommendations/Intent for next treatment session: \"Next visit will focus on ROM and strengthening of R knee and LE. Gait training. \". Total Treatment Duration: PT Patient Time In/Time Out Time In: 1100 Time Out: 1200 Treatment number  4 Camille Rodriguez, PT

## 2018-10-30 ENCOUNTER — HOSPITAL ENCOUNTER (OUTPATIENT)
Dept: PHYSICAL THERAPY | Age: 68
Discharge: HOME | End: 2018-10-30
Payer: MEDICARE

## 2018-10-30 PROCEDURE — 97110 THERAPEUTIC EXERCISES: CPT

## 2018-10-30 PROCEDURE — 97140 MANUAL THERAPY 1/> REGIONS: CPT

## 2018-10-30 NOTE — PROGRESS NOTES
Milly Current : 1950 [place] / Product Type: Medicare /  [address]  at [address] 
[address], [address] [place] [address] Jason, [address] [phone]   [phone] OUTPATIENT PHYSICAL THERAPY:Daily Note 10/30/2018 ICD-10: Treatment Diagnosis:  
R26.2 Difficulty in walking, not elsewhere classified M25.661 Stiffness of right knee, not elsewhere classified M25.561 Pain in right knee Precautions/Allergies:  
Pcn [penicillins] Fall Risk Score: 1 (? 5 = High Risk) MD Orders: Eval and treat MEDICAL/REFERRING DIAGNOSIS: 
Presence of right artificial knee joint [Z96.651] DATE OF ONSET:  REFERRING PHYSICIAN: Joyce Elizabeth., * RETURN PHYSICIAN APPOINTMENT: 2 weeks INITIAL ASSESSMENT:  Ms. Khan Prior presents with decreased ROM and strength post R TKA on 18. Pt with fair gait with straight cane. Will need to work to increase ROM to normalize gait. Will need to work to increase ROM and strength to enable return to prior activity level. Pt is anxious to walk normally. Should progress well. PROBLEM LIST (Impacting functional limitations): 1. Decreased Strength 2. Decreased ADL/Functional Activities 3. Decreased Ambulation Ability/Technique 4. Decreased Balance 5. Increased Pain 6. Decreased Flexibility/Joint Mobility 7. Edema/Girth 8. Decreased [place] with Home Exercise Program INTERVENTIONS PLANNED: 
1. Gait Training 2. Home Exercise Program (HEP) 3. Manual Therapy 4. Range of Motion (ROM) 5. Therapeutic Activites 6. Therapeutic Exercise/Strengthening TREATMENT PLAN: 
Effective Dates: 10/16/2018 TO 1/15/2019 (90 days). Frequency/Duration: 2 times a week for 90and upon reassessment, will adjust frequency and duration as progress indicates. GOALS: (Goals have been discussed and agreed upon with patient.) Short-Term Functional Goals: Time Frame: 30 days 1. Establish independent HEP with no cueing to increase ROM and strength 2. Increase R knee ROM to 0-115 to increase ease of sitting and ambulation 3. Increase LE strength to enable initiation of reciprocal pattern on stairs 4. Able to ambulate 10-15 min with minimal to no increase in R knee pain . Beatris Ahn Discharge Goals: Time Frame: 90 days 1. Improve score on LEFS by 9 points to enable prolonged sitting, standing and ambulation 2. Increase R knee ROM to 0-125 to normalize gait 3. Increase R LE strength 1/2 to 1 grade to enable reciprocal pattern on stairs 4. Able to ambulate 20-30 min with minimal to no increase in R knee pain Rehabilitation Potential For Stated Goals: Good The information in this section was collected on 10/16/18 (except where otherwise noted). HISTORY:  
History of Present Injury/Illness (Reason for Referral): 
Pt is post R TKA on Sept 24,2018. Pt had Home PT until 10//13/18. Pt presents with decreased ROM and strength of R knee and LE. Pt with fair gait. Pt is referred to outpatient PT for aggressive ROM and strengthening of R knee and LE. Past Medical History/Comorbidities: Ms. Cathalene Mcardle  has a past medical history of Arthritis, Chronic pain, Environmental allergies, Hypertension, Ill-defined condition, Migraine, Psychiatric disorder, RLS (restless legs syndrome), and Rosacea. Ms. Cathalene Mcardle  has a past surgical history that includes pr abdomen surgery proc unlisted; hx appendectomy; hx dilation and curettage; and KNEE ARTHROPLASTY TOTAL/ RIGHT/ (Right, 9/24/2018). Social History/Living Environment:  
  Lives with spouse in 2 story home Prior Level of Function/Work/Activity: 
Retired. Cares for home. Wants to walk normally and without pain Dominant Side:  
      RIGHT Current Medications:   
  
Current Outpatient Medications:  
  HYDROmorphone (DILAUDID) 2 mg tablet, Take 1 Tab by mouth every four (4) hours as needed. Max Daily Amount: 12 mg., Disp: 40 Tab, Rfl: 0 
  FLUoxetine (PROZAC) 10 mg capsule, Take 10 mg by mouth daily. , Disp: , Rfl:  
  gabapentin (NEURONTIN) 300 mg capsule, Take 300 mg by mouth nightly., Disp: , Rfl:  
  cetirizine (ZYRTEC) 10 mg tablet, Take  by mouth nightly., Disp: , Rfl:  
  rOPINIRole (REQUIP) 1 mg tablet, Take 1 mg by mouth nightly. Indications: Restless Legs Syndrome, Disp: , Rfl:  
  fluticasone (FLONASE) 50 mcg/actuation nasal spray, 2 Sprays by Both Nostrils route as needed for Rhinitis., Disp: , Rfl:  
  metroNIDAZOLE (METROCREAM) 0.75 % topical cream, Apply  to affected area nightly. Use a thin layer to affected areas after washing, Disp: , Rfl:  
  verapamil ER (CALAN-SR) 120 mg tablet, Take 120 mg by mouth daily. , Disp: , Rfl:   
Tylenol for pain at times Date Last Reviewed:  10/30/2018 Number of Personal Factors/Comorbidities that affect the Plan of Care: 1-2: MODERATE COMPLEXITY EXAMINATION:  
Observation/Orthostatic Postural Assessment: To PT with straight cane. PT with slow gait and short step length. Limp to R. Pt with decreased knee ext at heel strike and stance. Tends to ambulate with flexed knee. Decreased toe off with decreased knee flexion. Pt with moderate edema. Pt with tight HS. In 90/90, HS are 55. Palpation:   
      Tender medial knee and back of knee Functional Mobility:  
      Gait/Ambulation:  Independent with straight cane. Stairs:  Difficulty with stairs ROM:  
  R knee -5 to 100 L knee 0-130 hyperext 5 Strength:  
  Knee ext  R 3+/5,  L 4/5 Knee flexion  R 4-/5, L 4/5 Hip flexion R 4-/5,  L 4/5 Hip abd  R 3+/5,  L 4-/5 Special Tests:  
Neurological Screen: Intact to light touch Balance:  Fair Body Structures Involved: 1. Bones 2. Joints 3. Muscles 4. Ligaments Body Functions Affected: 1. Sensory/Pain 2.  Neuromusculoskeletal 
 3. Movement Related Activities and Participation Affected: 1. Learning and Applying Knowledge 2. General Tasks and Demands 3. Mobility 4. Domestic Life 5. Community, Social and [place] Number of elements (examined above) that affect the Plan of Care: 3: MODERATE COMPLEXITY CLINICAL PRESENTATION:  
Presentation: Evolving clinical presentation with changing clinical characteristics: MODERATE COMPLEXITY CLINICAL DECISION MAKING:  
Outcome Measure: Tool Used: Lower Extremity Functional Scale (LEFS) Score:  Initial: 19/80 Most Recent: X/80 (Date: -- ) Interpretation of Score: 20 questions each scored on a 5 point scale with 0 representing \"extreme difficulty or unable to perform\" and 4 representing \"no difficulty\". The lower the score, the greater the functional disability. 80/80 represents no disability. Minimal detectable change is 9 points. Score 80 79-63 62-48 47-32 31-16 15-1 0 Modifier CH CI CJ CK CL CM CN  
 
? Mobility - Walking and Moving Around:  
  - CURRENT STATUS: CL - 60%-79% impaired, limited or restricted  - GOAL STATUS: CK - 40%-59% impaired, limited or restricted  - D/C STATUS:  ---------------To be determined--------------- Medical Necessity:  
· Patient is expected to demonstrate progress in strength, range of motion and gait and pain  to increase independence with home and community activities . Reason for Services/Other Comments: 
· Patient continues to require skilled intervention due to decreased ROM and strength of R knee and LE. Fair gait . Use of outcome tool(s) and clinical judgement create a POC that gives a: Clear prediction of patient's progress: LOW COMPLEXITY  
  
 
 
 
TREATMENT:  
(In addition to Assessment/Re-Assessment sessions the following treatments were rendered) Pre-treatment Symptoms/Complaints:  R knee is sore but I think I am walking better. Tight in AM and after sitting. .  
Pain: Initial: Pain Intensity 1: 4  Post Session:  4  
 
THERAPEUTIC EXERCISE: (30 minutes):  Exercises per grid below to improve mobility, strength and gait. Required moderate verbal and manual cues to promote proper body alignment, promote proper body posture and promote proper body mechanics. Progressed resistance, range and repetitions as indicated. Performed  exercise program of QS (quadriceps sets), SAQ's (short arc quadriceps), and LAQ's (long arc quadriceps) x 20. Seated HSC at 15 # x 4 sets of 10. Worked on cable for hip flex and abd at 17# and hip ext at 25# x 2 sets of 15. HS and HC stretch. Worked on 6 in step up x 10 then  step up and over x 15. .  Worked on heel toe gait with straight cane emphasizing increased knee ext at heel strike and stance. Improving gait . NuStep x 8 min at level  3. Manual Therapy ( 30 min  ): Patella and patella tendon mobs. Soft tissue work to [employer] and HS. PROM for knee flex and extn. Sitting knee flexion to 122. Full passive ext after stretching. Therapeutic Modalities: HEP: As above; handouts given to patient for all exercises. [email or web address] Portal 
 
Treatment/Session Assessment:   Pt is post R TKA on Sept 24,2018. Pt presents with decreased ROM and strength. Pt with improved gait with straight cane. Progressing to no assistive device. Not using cane in home. .Slow but steady increase in ROM. Good ext. Work on end range flexion. Pain is limiting factor. .  Working to increase ROM to normalize gait and increase confidence in gait. Confidence is improving. .   Working to increase ROM and strength to enable return to prior activity level. Pt is anxious to walk normally. Should progress well. · Response to Treatment:  Understands exercises . · Compliance with Program/Exercises: doing exercises . · Recommendations/Intent for next treatment session: \"Next visit will focus on ROM and strengthening of R knee and LE. Gait training.  \". 
 Total Treatment Duration: PT Patient Time In/Time Out Time In: 0100 Time Out: 0200 Treatment number  4 Bree Robins

## 2018-11-02 ENCOUNTER — HOSPITAL ENCOUNTER (OUTPATIENT)
Dept: PHYSICAL THERAPY | Age: 68
Discharge: HOME | End: 2018-11-02
Payer: MEDICARE

## 2018-11-02 PROCEDURE — 97110 THERAPEUTIC EXERCISES: CPT

## 2018-11-02 PROCEDURE — 97140 MANUAL THERAPY 1/> REGIONS: CPT

## 2018-11-02 NOTE — PROGRESS NOTES
Derrell Atkinson : 1950 [place] / Product Type: Medicare /  [address]  at [address] 
[address], [address] [place], [place] [address]en, [address] [phone]   [phone] OUTPATIENT PHYSICAL THERAPY:Daily Note 2018 ICD-10: Treatment Diagnosis:  
R26.2 Difficulty in walking, not elsewhere classified M25.661 Stiffness of right knee, not elsewhere classified M25.561 Pain in right knee Precautions/Allergies:  
Pcn [penicillins] Fall Risk Score: 1 (? 5 = High Risk) MD Orders: Eval and treat MEDICAL/REFERRING DIAGNOSIS: 
Presence of right artificial knee joint [Z96.651] DATE OF ONSET:  REFERRING PHYSICIAN: Frankie Minor., * RETURN PHYSICIAN APPOINTMENT: 2 weeks INITIAL ASSESSMENT:  Ms. Mirta Valadez presents with decreased ROM and strength post R TKA on 18. Pt with fair gait with straight cane. Will need to work to increase ROM to normalize gait. Will need to work to increase ROM and strength to enable return to prior activity level. Pt is anxious to walk normally. Should progress well. PROBLEM LIST (Impacting functional limitations): 1. Decreased Strength 2. Decreased ADL/Functional Activities 3. Decreased Ambulation Ability/Technique 4. Decreased Balance 5. Increased Pain 6. Decreased Flexibility/Joint Mobility 7. Edema/Girth 8. Decreased [place] with Home Exercise Program INTERVENTIONS PLANNED: 
1. Gait Training 2. Home Exercise Program (HEP) 3. Manual Therapy 4. Range of Motion (ROM) 5. Therapeutic Activites 6. Therapeutic Exercise/Strengthening TREATMENT PLAN: 
Effective Dates: 10/16/2018 TO 1/15/2019 (90 days). Frequency/Duration: 2 times a week for 90and upon reassessment, will adjust frequency and duration as progress indicates. GOALS: (Goals have been discussed and agreed upon with patient.) Short-Term Functional Goals: Time Frame: 30 days 1. Establish independent HEP with no cueing to increase ROM and strength 2. Increase R knee ROM to 0-115 to increase ease of sitting and ambulation 3. Increase LE strength to enable initiation of reciprocal pattern on stairs 4. Able to ambulate 10-15 min with minimal to no increase in R knee pain . Osvaldo Inch Discharge Goals: Time Frame: 90 days 1. Improve score on LEFS by 9 points to enable prolonged sitting, standing and ambulation 2. Increase R knee ROM to 0-125 to normalize gait 3. Increase R LE strength 1/2 to 1 grade to enable reciprocal pattern on stairs 4. Able to ambulate 20-30 min with minimal to no increase in R knee pain Rehabilitation Potential For Stated Goals: Good The information in this section was collected on 10/16/18 (except where otherwise noted). HISTORY:  
History of Present Injury/Illness (Reason for Referral): 
Pt is post R TKA on Sept 24,2018. Pt had Home PT until 10//13/18. Pt presents with decreased ROM and strength of R knee and LE. Pt with fair gait. Pt is referred to outpatient PT for aggressive ROM and strengthening of R knee and LE. Past Medical History/Comorbidities: Ms. Khan Prior  has a past medical history of Arthritis, Chronic pain, Environmental allergies, Hypertension, Ill-defined condition, Migraine, Psychiatric disorder, RLS (restless legs syndrome), and Rosacea. Ms. Khan Prior  has a past surgical history that includes pr abdomen surgery proc unlisted; hx appendectomy; hx dilation and curettage; and KNEE ARTHROPLASTY TOTAL/ RIGHT/ (Right, 9/24/2018). Social History/Living Environment:  
  Lives with spouse in 2 story home Prior Level of Function/Work/Activity: 
Retired. Cares for home. Wants to walk normally and without pain Dominant Side:  
      RIGHT Current Medications:   
  
Current Outpatient Medications:  
  HYDROmorphone (DILAUDID) 2 mg tablet, Take 1 Tab by mouth every four (4) hours as needed. Max Daily Amount: 12 mg., Disp: 40 Tab, Rfl: 0 
  FLUoxetine (PROZAC) 10 mg capsule, Take 10 mg by mouth daily. , Disp: , Rfl:  
  gabapentin (NEURONTIN) 300 mg capsule, Take 300 mg by mouth nightly., Disp: , Rfl:  
  cetirizine (ZYRTEC) 10 mg tablet, Take  by mouth nightly., Disp: , Rfl:  
  rOPINIRole (REQUIP) 1 mg tablet, Take 1 mg by mouth nightly. Indications: Restless Legs Syndrome, Disp: , Rfl:  
  fluticasone (FLONASE) 50 mcg/actuation nasal spray, 2 Sprays by Both Nostrils route as needed for Rhinitis., Disp: , Rfl:  
  metroNIDAZOLE (METROCREAM) 0.75 % topical cream, Apply  to affected area nightly. Use a thin layer to affected areas after washing, Disp: , Rfl:  
  verapamil ER (CALAN-SR) 120 mg tablet, Take 120 mg by mouth daily. , Disp: , Rfl:   
Tylenol for pain at times Date Last Reviewed:  11/2/2018 Number of Personal Factors/Comorbidities that affect the Plan of Care: 1-2: MODERATE COMPLEXITY EXAMINATION:  
Observation/Orthostatic Postural Assessment: To PT with straight cane. PT with slow gait and short step length. Limp to R. Pt with decreased knee ext at heel strike and stance. Tends to ambulate with flexed knee. Decreased toe off with decreased knee flexion. Pt with moderate edema. Pt with tight HS. In 90/90, HS are 55. Palpation:   
      Tender medial knee and back of knee Functional Mobility:  
      Gait/Ambulation:  Independent with straight cane. Stairs:  Difficulty with stairs ROM:  
  R knee -5 to 100 L knee 0-130 hyperext 5 Strength:  
  Knee ext  R 3+/5,  L 4/5 Knee flexion  R 4-/5, L 4/5 Hip flexion R 4-/5,  L 4/5 Hip abd  R 3+/5,  L 4-/5 Special Tests:  
Neurological Screen: Intact to light touch Balance:  Fair Body Structures Involved: 1. Bones 2. Joints 3. Muscles 4. Ligaments Body Functions Affected: 1. Sensory/Pain 2.  Neuromusculoskeletal 
 3. Movement Related Activities and Participation Affected: 1. Learning and Applying Knowledge 2. General Tasks and Demands 3. Mobility 4. Domestic Life 5. Community, Social and [place] Number of elements (examined above) that affect the Plan of Care: 3: MODERATE COMPLEXITY CLINICAL PRESENTATION:  
Presentation: Evolving clinical presentation with changing clinical characteristics: MODERATE COMPLEXITY CLINICAL DECISION MAKING:  
Outcome Measure: Tool Used: Lower Extremity Functional Scale (LEFS) Score:  Initial: 19/80 Most Recent: X/80 (Date: -- ) Interpretation of Score: 20 questions each scored on a 5 point scale with 0 representing \"extreme difficulty or unable to perform\" and 4 representing \"no difficulty\". The lower the score, the greater the functional disability. 80/80 represents no disability. Minimal detectable change is 9 points. Score 80 79-63 62-48 47-32 31-16 15-1 0 Modifier CH CI CJ CK CL CM CN  
 
? Mobility - Walking and Moving Around:  
  - CURRENT STATUS: CL - 60%-79% impaired, limited or restricted  - GOAL STATUS: CK - 40%-59% impaired, limited or restricted  - D/C STATUS:  ---------------To be determined--------------- Medical Necessity:  
· Patient is expected to demonstrate progress in strength, range of motion and gait and pain  to increase independence with home and community activities . Reason for Services/Other Comments: 
· Patient continues to require skilled intervention due to decreased ROM and strength of R knee and LE. Fair gait . Use of outcome tool(s) and clinical judgement create a POC that gives a: Clear prediction of patient's progress: LOW COMPLEXITY  
  
 
 
 
TREATMENT:  
(In addition to Assessment/Re-Assessment sessions the following treatments were rendered) Pre-treatment Symptoms/Complaints:  I tripped coming down the stairs at our house. My knee feels fine but I grabbed with my R arm and my R shoulder really hurts. Yasmine Guaman Pain: Initial:  
Pain Intensity 1: 3  Post Session:  4  
 
THERAPEUTIC EXERCISE: (30 minutes):  Exercises per grid below to improve mobility, strength and gait. Required moderate verbal and manual cues to promote proper body alignment, promote proper body posture and promote proper body mechanics. Progressed resistance, range and repetitions as indicated. Performed  QS (quadriceps sets), SAQ's (short arc quadriceps), and LAQ's (long arc quadriceps) x 20. Seated HSC at 15 # x 4 sets of 10. Worked on cable for hip flex and abd at 17# and hip ext at 25# x 2 sets of 15. HS and HC stretch. Worked on 6 in   step up and over x 20. .  Worked on heel toe gait without straight cane emphasizing increased knee ext at heel strike and stance. Improving gait . NuStep x 10 min at level  3. Instructed how to protect arm at home. Manual Therapy ( 30 min  ): Patella and patella tendon mobs. Soft tissue work to [employer] and HS. PROM for knee flex and extn. Sitting knee flexion to 128. Full passive ext after stretching. Therapeutic Modalities: HEP: As above; handouts given to patient for all exercises. [employer] Portal 
 
Treatment/Session Assessment:   Pt is post R TKA on Sept 24,2018. Pt presents with decreased ROM and strength. Pt with improved gait with straight cane and now progressing to no assistive device. No longer using cane in home. .Slow but steady increase in ROM. Good ext. Work on end range flexion. Pain is limiting factor. Pt fell against injured shoulder and now with increased R shoulder pain. Working to increase ROM to normalize gait and increase confidence in gait. Confidence is improving as gait improves. .  Working to increase ROM and strength to enable return to prior activity level. Pt is anxious to walk normally. Should progress well. · Response to Treatment:  Understands exercises . · Compliance with Program/Exercises: doing exercises . · Recommendations/Intent for next treatment session: \"Next visit will focus on ROM and strengthening of R knee and LE. Gait training. \". Total Treatment Duration: PT Patient Time In/Time Out Time In: 0900 Time Out: 1000 Treatment number  5 Gene Quintero, PT

## 2018-11-06 ENCOUNTER — HOSPITAL ENCOUNTER (OUTPATIENT)
Dept: PHYSICAL THERAPY | Age: 68
Discharge: HOME | End: 2018-11-06
Payer: MEDICARE

## 2018-11-06 PROCEDURE — 97140 MANUAL THERAPY 1/> REGIONS: CPT

## 2018-11-06 PROCEDURE — 97110 THERAPEUTIC EXERCISES: CPT

## 2018-11-06 NOTE — PROGRESS NOTES
Emanuel Lavinia : 1950 [place] / [place] / Product Type: Medicare /  [address]  at [address] 
[address], [address] [place] [address], [address] [phone]   [phone] OUTPATIENT PHYSICAL THERAPY:Daily Note 2018 ICD-10: Treatment Diagnosis:  
R26.2 Difficulty in walking, not elsewhere classified M25.661 Stiffness of right knee, not elsewhere classified M25.561 Pain in right knee Precautions/Allergies:  
Pcn [penicillins] Fall Risk Score: 1 (? 5 = High Risk) MD Orders: Eval and treat MEDICAL/REFERRING DIAGNOSIS: 
Presence of right artificial knee joint [Z96.651] DATE OF ONSET:  REFERRING PHYSICIAN: Chadd Layton., * RETURN PHYSICIAN APPOINTMENT: 2 weeks INITIAL ASSESSMENT:  Ms. Loretta Joy presents with decreased ROM and strength post R TKA on 18. Pt with fair gait with straight cane. Will need to work to increase ROM to normalize gait. Will need to work to increase ROM and strength to enable return to prior activity level. Pt is anxious to walk normally. Should progress well. PROBLEM LIST (Impacting functional limitations): 1. Decreased Strength 2. Decreased ADL/Functional Activities 3. Decreased Ambulation Ability/Technique 4. Decreased Balance 5. Increased Pain 6. Decreased Flexibility/Joint Mobility 7. Edema/Girth 8. Decreased [place] with Home Exercise Program INTERVENTIONS PLANNED: 
1. Gait Training 2. Home Exercise Program (HEP) 3. Manual Therapy 4. Range of Motion (ROM) 5. Therapeutic Activites 6. Therapeutic Exercise/Strengthening TREATMENT PLAN: 
Effective Dates: 10/16/2018 TO 1/15/2019 (90 days). Frequency/Duration: 2 times a week for 90and upon reassessment, will adjust frequency and duration as progress indicates. GOALS: (Goals have been discussed and agreed upon with patient.) Short-Term Functional Goals: Time Frame: 30 days 1. Establish independent HEP with no cueing to increase ROM and strength 2. Increase R knee ROM to 0-115 to increase ease of sitting and ambulation 3. Increase LE strength to enable initiation of reciprocal pattern on stairs 4. Able to ambulate 10-15 min with minimal to no increase in R knee pain . Catarino Kellogg Discharge Goals: Time Frame: 90 days 1. Improve score on LEFS by 9 points to enable prolonged sitting, standing and ambulation 2. Increase R knee ROM to 0-125 to normalize gait 3. Increase R LE strength 1/2 to 1 grade to enable reciprocal pattern on stairs 4. Able to ambulate 20-30 min with minimal to no increase in R knee pain Rehabilitation Potential For Stated Goals: Good The information in this section was collected on 10/16/18 (except where otherwise noted). HISTORY:  
History of Present Injury/Illness (Reason for Referral): 
Pt is post R TKA on Sept 24,2018. Pt had Home PT until 10//13/18. Pt presents with decreased ROM and strength of R knee and LE. Pt with fair gait. Pt is referred to outpatient PT for aggressive ROM and strengthening of R knee and LE. Past Medical History/Comorbidities: Ms. Kiko Silva  has a past medical history of Arthritis, Chronic pain, Environmental allergies, Hypertension, Ill-defined condition, Migraine, Psychiatric disorder, RLS (restless legs syndrome), and Rosacea. Ms. Kiko Silva  has a past surgical history that includes pr abdomen surgery proc unlisted; hx appendectomy; hx dilation and curettage; and KNEE ARTHROPLASTY TOTAL/ RIGHT/ (Right, 9/24/2018). Social History/Living Environment:  
  Lives with spouse in 2 story home Prior Level of Function/Work/Activity: 
Retired. Cares for home. Wants to walk normally and without pain Dominant Side:  
      RIGHT Current Medications:   
  
Current Outpatient Medications:  
  HYDROmorphone (DILAUDID) 2 mg tablet, Take 1 Tab by mouth every four (4) hours as needed. Max Daily Amount: 12 mg., Disp: 40 Tab, Rfl: 0 
  FLUoxetine (PROZAC) 10 mg capsule, Take 10 mg by mouth daily. , Disp: , Rfl:  
  gabapentin (NEURONTIN) 300 mg capsule, Take 300 mg by mouth nightly., Disp: , Rfl:  
  cetirizine (ZYRTEC) 10 mg tablet, Take  by mouth nightly., Disp: , Rfl:  
  rOPINIRole (REQUIP) 1 mg tablet, Take 1 mg by mouth nightly. Indications: Restless Legs Syndrome, Disp: , Rfl:  
  fluticasone (FLONASE) 50 mcg/actuation nasal spray, 2 Sprays by Both Nostrils route as needed for Rhinitis., Disp: , Rfl:  
  metroNIDAZOLE (METROCREAM) 0.75 % topical cream, Apply  to affected area nightly. Use a thin layer to affected areas after washing, Disp: , Rfl:  
  verapamil ER (CALAN-SR) 120 mg tablet, Take 120 mg by mouth daily. , Disp: , Rfl:   
Tylenol for pain at times Date Last Reviewed:  11/6/2018 Number of Personal Factors/Comorbidities that affect the Plan of Care: 1-2: MODERATE COMPLEXITY EXAMINATION:  
Observation/Orthostatic Postural Assessment: To PT with straight cane. PT with slow gait and short step length. Limp to R. Pt with decreased knee ext at heel strike and stance. Tends to ambulate with flexed knee. Decreased toe off with decreased knee flexion. Pt with moderate edema. Pt with tight HS. In 90/90, HS are 55. Palpation:   
      Tender medial knee and back of knee Functional Mobility:  
      Gait/Ambulation:  Independent with straight cane. Stairs:  Difficulty with stairs ROM:  
  R knee -5 to 100 L knee 0-130 hyperext 5 Strength:  
  Knee ext  R 3+/5,  L 4/5 Knee flexion  R 4-/5, L 4/5 Hip flexion R 4-/5,  L 4/5 Hip abd  R 3+/5,  L 4-/5 Special Tests:  
Neurological Screen: Intact to light touch Balance:  Fair Body Structures Involved: 1. Bones 2. Joints 3. Muscles 4. Ligaments Body Functions Affected: 1. Sensory/Pain 2.  Neuromusculoskeletal 
 3. Movement Related Activities and Participation Affected: 1. Learning and Applying Knowledge 2. General Tasks and Demands 3. Mobility 4. Domestic Life 5. Community, Social and [place] Number of elements (examined above) that affect the Plan of Care: 3: MODERATE COMPLEXITY CLINICAL PRESENTATION:  
Presentation: Evolving clinical presentation with changing clinical characteristics: MODERATE COMPLEXITY CLINICAL DECISION MAKING:  
Outcome Measure: Tool Used: Lower Extremity Functional Scale (LEFS) Score:  Initial: 19/80 Most Recent: X/80 (Date: -- ) Interpretation of Score: 20 questions each scored on a 5 point scale with 0 representing \"extreme difficulty or unable to perform\" and 4 representing \"no difficulty\". The lower the score, the greater the functional disability. 80/80 represents no disability. Minimal detectable change is 9 points. Score 80 79-63 62-48 47-32 31-16 15-1 0 Modifier CH CI CJ CK CL CM CN  
 
? Mobility - Walking and Moving Around:  
  - CURRENT STATUS: CL - 60%-79% impaired, limited or restricted  - GOAL STATUS: CK - 40%-59% impaired, limited or restricted  - D/C STATUS:  ---------------To be determined--------------- Medical Necessity:  
· Patient is expected to demonstrate progress in strength, range of motion and gait and pain  to increase independence with home and community activities . Reason for Services/Other Comments: 
· Patient continues to require skilled intervention due to decreased ROM and strength of R knee and LE. Fair gait . Use of outcome tool(s) and clinical judgement create a POC that gives a: Clear prediction of patient's progress: LOW COMPLEXITY  
  
 
 
 
TREATMENT:  
(In addition to Assessment/Re-Assessment sessions the following treatments were rendered) Pre-treatment Symptoms/Complaints:  I tripped coming down the stairs at our house. My knee feels fine but I grabbed with my R arm and my R shoulder really hurts. Stefanie Portillo Pain: Initial:  
Pain Intensity 1: 3  Post Session:  4  
 
THERAPEUTIC EXERCISE: (30 minutes):  Exercises per grid below to improve mobility, strength and gait. Required moderate verbal and manual cues to promote proper body alignment, promote proper body posture and promote proper body mechanics. Progressed resistance, range and repetitions as indicated. Performed  QS (quadriceps sets), SAQ's (short arc quadriceps), and LAQ's (long arc quadriceps) x 20. Seated HSC at 15 # x 4 sets of 10. Worked on cable for hip flex and abd at 17# and hip ext at 25# x 2 sets of 15. HS and HC stretch. Worked on 6 in   step up and over x 20. .  Worked on heel toe gait without straight cane emphasizing increased knee ext at heel strike and stance. Improving gait . NuStep x 10 min at level  3. Instructed how to protect arm at home. Manual Therapy ( 30 min  ): Patella and patella tendon mobs. Soft tissue work to [employer] and HS. PROM for knee flex and extn. Sitting knee flexion to 128. Full passive ext after stretching. Therapeutic Modalities: HEP: As above; handouts given to patient for all exercises. [employer] 
 
Treatment/Session Assessment:   Pt is post R TKA on Sept 24,2018. Pt presents with decreased ROM and strength. Pt with improved gait with straight cane and now progressing to no assistive device. No longer using cane in home. .Slow but steady increase in ROM. Good ext. Work on end range flexion. Pain is limiting factor. Pt fell against injured shoulder and now with increased R shoulder pain. Working to increase ROM to normalize gait and increase confidence in gait. Confidence is improving as gait improves. .  Working to increase ROM and strength to enable return to prior activity level. Pt is anxious to walk normally. Should progress well. · Response to Treatment:  Understands exercises . · Compliance with Program/Exercises: doing exercises . · Recommendations/Intent for next treatment session: \"Next visit will focus on ROM and strengthening of R knee and LE. Gait training. \". Total Treatment Duration: PT Patient Time In/Time Out Time In: 0200 Time Out: 0300 Treatment number  6 Gene Quintero, PT

## 2018-11-08 ENCOUNTER — HOSPITAL ENCOUNTER (OUTPATIENT)
Dept: PHYSICAL THERAPY | Age: 68
Discharge: HOME | End: 2018-11-08
Payer: MEDICARE

## 2018-11-08 PROCEDURE — 97140 MANUAL THERAPY 1/> REGIONS: CPT

## 2018-11-08 PROCEDURE — 97110 THERAPEUTIC EXERCISES: CPT

## 2018-11-08 NOTE — PROGRESS NOTES
Edmar Akers : 1950 [place] / Product Type: Medicare /  [address]  [address] 
[address], [address] [place], [place] [address], [address] [phone]   [phone] OUTPATIENT PHYSICAL THERAPY:Daily Note 2018 ICD-10: Treatment Diagnosis:  
R26.2 Difficulty in walking, not elsewhere classified M25.661 Stiffness of right knee, not elsewhere classified M25.561 Pain in right knee Precautions/Allergies:  
Pcn [penicillins] Fall Risk Score: 1 (? 5 = High Risk) MD Orders: Eval and treat MEDICAL/REFERRING DIAGNOSIS: 
Presence of right artificial knee joint [Z96.651] DATE OF ONSET:  REFERRING PHYSICIAN: Catalina Lorenz., * RETURN PHYSICIAN APPOINTMENT: 2 weeks INITIAL ASSESSMENT:  Ms. Kiko Silva presents with decreased ROM and strength post R TKA on 18. Pt with fair gait with straight cane. Will need to work to increase ROM to normalize gait. Will need to work to increase ROM and strength to enable return to prior activity level. Pt is anxious to walk normally. Should progress well. PROBLEM LIST (Impacting functional limitations): 1. Decreased Strength 2. Decreased ADL/Functional Activities 3. Decreased Ambulation Ability/Technique 4. Decreased Balance 5. Increased Pain 6. Decreased Flexibility/Joint Mobility 7. Edema/Girth 8. Decreased [place] with Home Exercise Program INTERVENTIONS PLANNED: 
1. Gait Training 2. Home Exercise Program (HEP) 3. Manual Therapy 4. Range of Motion (ROM) 5. Therapeutic Activites 6. Therapeutic Exercise/Strengthening TREATMENT PLAN: 
Effective Dates: 10/16/2018 TO 1/15/2019 (90 days). Frequency/Duration: 2 times a week for 90and upon reassessment, will adjust frequency and duration as progress indicates. GOALS: (Goals have been discussed and agreed upon with patient.) Short-Term Functional Goals: Time Frame: 30 days 1. Establish independent HEP with no cueing to increase ROM and strength 2. Increase R knee ROM to 0-115 to increase ease of sitting and ambulation 3. Increase LE strength to enable initiation of reciprocal pattern on stairs 4. Able to ambulate 10-15 min with minimal to no increase in R knee pain . [place] Avila Discharge Goals: Time Frame: 90 days 1. Improve score on LEFS by 9 points to enable prolonged sitting, standing and ambulation 2. Increase R knee ROM to 0-125 to normalize gait 3. Increase R LE strength 1/2 to 1 grade to enable reciprocal pattern on stairs 4. Able to ambulate 20-30 min with minimal to no increase in R knee pain Rehabilitation Potential For Stated Goals: Good The information in this section was collected on 10/16/18 (except where otherwise noted). HISTORY:  
History of Present Injury/Illness (Reason for Referral): 
Pt is post R TKA on Sept 24,2018. Pt had Home PT until 10//13/18. Pt presents with decreased ROM and strength of R knee and LE. Pt with fair gait. Pt is referred to outpatient PT for aggressive ROM and strengthening of R knee and LE. Past Medical History/Comorbidities: Ms. Negar Henderson  has a past medical history of Arthritis, Chronic pain, Environmental allergies, Hypertension, Ill-defined condition, Migraine, Psychiatric disorder, RLS (restless legs syndrome), and Rosacea. Ms. Negar Henderson  has a past surgical history that includes pr abdomen surgery proc unlisted; hx appendectomy; hx dilation and curettage; and KNEE ARTHROPLASTY TOTAL/ RIGHT/ (Right, 9/24/2018). Social History/Living Environment:  
  Lives with spouse in 2 story home Prior Level of Function/Work/Activity: 
Retired. Cares for home. Wants to walk normally and without pain Dominant Side:  
      RIGHT Current Medications:   
  
Current Outpatient Medications:  
  HYDROmorphone (DILAUDID) 2 mg tablet, Take 1 Tab by mouth every four (4) hours as needed. Max Daily Amount: 12 mg., Disp: 40 Tab, Rfl: 0 
  FLUoxetine (PROZAC) 10 mg capsule, Take 10 mg by mouth daily. , Disp: , Rfl:  
  gabapentin (NEURONTIN) 300 mg capsule, Take 300 mg by mouth nightly., Disp: , Rfl:  
  cetirizine (ZYRTEC) 10 mg tablet, Take  by mouth nightly., Disp: , Rfl:  
  rOPINIRole (REQUIP) 1 mg tablet, Take 1 mg by mouth nightly. Indications: Restless Legs Syndrome, Disp: , Rfl:  
  fluticasone (FLONASE) 50 mcg/actuation nasal spray, 2 Sprays by Both Nostrils route as needed for Rhinitis., Disp: , Rfl:  
  metroNIDAZOLE (METROCREAM) 0.75 % topical cream, Apply  to affected area nightly. Use a thin layer to affected areas after washing, Disp: , Rfl:  
  verapamil ER (CALAN-SR) 120 mg tablet, Take 120 mg by mouth daily. , Disp: , Rfl:   
Tylenol for pain at times Date Last Reviewed:  11/8/2018 Number of Personal Factors/Comorbidities that affect the Plan of Care: 1-2: MODERATE COMPLEXITY EXAMINATION:  
Observation/Orthostatic Postural Assessment: To PT with straight cane. PT with slow gait and short step length. Limp to R. Pt with decreased knee ext at heel strike and stance. Tends to ambulate with flexed knee. Decreased toe off with decreased knee flexion. Pt with moderate edema. Pt with tight HS. In 90/90, HS are 55. Palpation:   
      Tender medial knee and back of knee Functional Mobility:  
      Gait/Ambulation:  Independent with straight cane. Stairs:  Difficulty with stairs ROM:  
  R knee -5 to 100 L knee 0-130 hyperext 5 Strength:  
  Knee ext  R 3+/5,  L 4/5 Knee flexion  R 4-/5, L 4/5 Hip flexion R 4-/5,  L 4/5 Hip abd  R 3+/5,  L 4-/5 Special Tests:  
Neurological Screen: Intact to light touch Balance:  Fair Body Structures Involved: 1. Bones 2. Joints 3. Muscles 4. Ligaments Body Functions Affected: 1. Sensory/Pain 2.  Neuromusculoskeletal 
 3. Movement Related Activities and Participation Affected: 1. Learning and Applying Knowledge 2. General Tasks and Demands 3. Mobility 4. Domestic Life 5. Community, Social and [place] Number of elements (examined above) that affect the Plan of Care: 3: MODERATE COMPLEXITY CLINICAL PRESENTATION:  
Presentation: Evolving clinical presentation with changing clinical characteristics: MODERATE COMPLEXITY CLINICAL DECISION MAKING:  
Outcome Measure: Tool Used: Lower Extremity Functional Scale (LEFS) Score:  Initial: 19/80 Most Recent: X/80 (Date: -- ) Interpretation of Score: 20 questions each scored on a 5 point scale with 0 representing \"extreme difficulty or unable to perform\" and 4 representing \"no difficulty\". The lower the score, the greater the functional disability. 80/80 represents no disability. Minimal detectable change is 9 points. Score 80 79-63 62-48 47-32 31-16 15-1 0 Modifier CH CI CJ CK CL CM CN  
 
? Mobility - Walking and Moving Around:  
  - CURRENT STATUS: CL - 60%-79% impaired, limited or restricted  - GOAL STATUS: CK - 40%-59% impaired, limited or restricted  - D/C STATUS:  ---------------To be determined--------------- Medical Necessity:  
· Patient is expected to demonstrate progress in strength, range of motion and gait and pain  to increase independence with home and community activities . Reason for Services/Other Comments: 
· Patient continues to require skilled intervention due to decreased ROM and strength of R knee and LE. Fair gait . Use of outcome tool(s) and clinical judgement create a POC that gives a: Clear prediction of patient's progress: LOW COMPLEXITY  
  
 
 
 
TREATMENT:  
(In addition to Assessment/Re-Assessment sessions the following treatments were rendered) Pre-treatment Symptoms/Complaints: Patient reports just aching today. Pain: Initial:  
Pain Intensity 1: 3  Post Session:  3 THERAPEUTIC EXERCISE: (25 minutes):  Exercises per grid below to improve mobility, strength and gait. Required moderate verbal and manual cues to promote proper body alignment, promote proper body posture and promote proper body mechanics. Progressed resistance, range and repetitions as indicated. Performed  QS (quadriceps sets), SAQ's (short arc quadriceps), and LAQ's (long arc quadriceps) x 20. Seated HSC at 15 # x 4 sets of 10. Worked on cable for hip flex and abd at 17# and hip ext at 25# x 2 sets of 15. HS and HC stretch. Worked on 6 in   step up and over x 20. Osvaldo Inch .NuStep x 10 min at level  3. Instructed how to protect arm at home. Manual Therapy ( 30 min  ): Patella and patella tendon mobs. Soft tissue work to [employer] and HS. PROM for knee flex and extn. Sitting knee flexion to 128. Full passive ext after stretching. Therapeutic Modalities: HEP: As directed. [employer] 
 
Treatment/Session Assessment:   Pt is post R TKA on Sept 24,2018. Pt presents with decreased ROM and strength. Pt with improved gait with straight cane and now progressing to no assistive device. No longer using cane in home. .Slow but steady increase in ROM. Good ext. Work on end range flexion. Pain is limiting factor. Pt fell against injured shoulder and now with increased R shoulder pain. Working to increase ROM to normalize gait and increase confidence in gait. Working to increase ROM and strength to enable return to prior activity level. Instructed patient to continue home exercises and stretches as directed. · Response to Treatment:  Understands exercises . · Compliance with Program/Exercises: doing exercises . · Recommendations/Intent for next treatment session: \"Next visit will focus on ROM and strengthening of R knee and LE. Gait training. \". Total Treatment Duration: PT Patient Time In/Time Out Time In: 0200 Time Out: 0198 Treatment number  6 Bree Robins PTA

## 2018-11-12 ENCOUNTER — HOSPITAL ENCOUNTER (OUTPATIENT)
Dept: PHYSICAL THERAPY | Age: 68
Discharge: HOME | End: 2018-11-12
Payer: MEDICARE

## 2018-11-12 PROCEDURE — G8980 MOBILITY D/C STATUS: HCPCS

## 2018-11-12 PROCEDURE — 97140 MANUAL THERAPY 1/> REGIONS: CPT

## 2018-11-12 PROCEDURE — 97110 THERAPEUTIC EXERCISES: CPT

## 2018-11-12 PROCEDURE — G8979 MOBILITY GOAL STATUS: HCPCS

## 2018-11-12 NOTE — PROGRESS NOTES
Ivan Issa : 1950 [place] / [place] / Product Type: Medicare /  [address]  at [address] [facility] 
[address], [address] [place] [address], [address] [phone]   [phone] OUTPATIENT PHYSICAL THERAPY:Daily Note and Discharge 2018 ICD-10: Treatment Diagnosis:  
R26.2 Difficulty in walking, not elsewhere classified M25.661 Stiffness of right knee, not elsewhere classified M25.561 Pain in right knee Precautions/Allergies:  
Pcn [penicillins] Fall Risk Score: 1 (? 5 = High Risk) MD Orders: Eval and treat MEDICAL/REFERRING DIAGNOSIS: 
Presence of right artificial knee joint [Z96.651] DATE OF ONSET:  REFERRING PHYSICIAN: Alexander Rod., * RETURN PHYSICIAN APPOINTMENT: 2 weeks INITIAL ASSESSMENT:  Ms. Winnie Gonzalez presents with decreased ROM and strength post R TKA on 18. Pt with fair gait with straight cane. Will need to work to increase ROM to normalize gait. Will need to work to increase ROM and strength to enable return to prior activity level. Pt is anxious to walk normally. Should progress well. PROBLEM LIST (Impacting functional limitations): 1. Decreased Strength 2. Decreased ADL/Functional Activities 3. Decreased Ambulation Ability/Technique 4. Decreased Balance 5. Increased Pain 6. Decreased Flexibility/Joint Mobility 7. Edema/Girth 8. Decreased [place] with Home Exercise Program INTERVENTIONS PLANNED: 
1. Gait Training 2. Home Exercise Program (HEP) 3. Manual Therapy 4. Range of Motion (ROM) 5. Therapeutic Activites 6. Therapeutic Exercise/Strengthening TREATMENT PLAN: 
Effective Dates: 10/16/2018 TO 1/15/2019 (90 days). Frequency/Duration: 2 times a week for 90and upon reassessment, will adjust frequency and duration as progress indicates. GOALS: (Goals have been discussed and agreed upon with patient.) Short-Term Functional Goals: Time Frame: 30 days 1. Establish independent HEP with no cueing to increase ROM and strength 2. Increase R knee ROM to 0-115 to increase ease of sitting and ambulation 3. Increase LE strength to enable initiation of reciprocal pattern on stairs 4. Able to ambulate 10-15 min with minimal to no increase in R knee pain . Osvaldo Inch Discharge Goals: Time Frame: 90 days 1. Improve score on LEFS by 9 points to enable prolonged sitting, standing and ambulation 2. Increase R knee ROM to 0-125 to normalize gait 3. Increase R LE strength 1/2 to 1 grade to enable reciprocal pattern on stairs 4. Able to ambulate 20-30 min with minimal to no increase in R knee pain Rehabilitation Potential For Stated Goals: Good The information in this section was collected on 10/16/18 (except where otherwise noted). HISTORY:  
History of Present Injury/Illness (Reason for Referral): 
Pt is post R TKA on Sept 24,2018. Pt had Home PT until 10//13/18. Pt presents with decreased ROM and strength of R knee and LE. Pt with fair gait. Pt is referred to outpatient PT for aggressive ROM and strengthening of R knee and LE. Past Medical History/Comorbidities: Ms. Khan Prior  has a past medical history of Arthritis, Chronic pain, Environmental allergies, Hypertension, Ill-defined condition, Migraine, Psychiatric disorder, RLS (restless legs syndrome), and Rosacea. Ms. Khan Prior  has a past surgical history that includes pr abdomen surgery proc unlisted; hx appendectomy; hx dilation and curettage; and KNEE ARTHROPLASTY TOTAL/ RIGHT/ (Right, 9/24/2018). Social History/Living Environment:  
  Lives with spouse in 2 story home Prior Level of Function/Work/Activity: 
Retired. Cares for home. Wants to walk normally and without pain Dominant Side:  
      RIGHT Current Medications:   
  
Current Outpatient Medications:  
  HYDROmorphone (DILAUDID) 2 mg tablet, Take 1 Tab by mouth every four (4) hours as needed. Max Daily Amount: 12 mg., Disp: 40 Tab, Rfl: 0 
  FLUoxetine (PROZAC) 10 mg capsule, Take 10 mg by mouth daily. , Disp: , Rfl:  
  gabapentin (NEURONTIN) 300 mg capsule, Take 300 mg by mouth nightly., Disp: , Rfl:  
  cetirizine (ZYRTEC) 10 mg tablet, Take  by mouth nightly., Disp: , Rfl:  
  rOPINIRole (REQUIP) 1 mg tablet, Take 1 mg by mouth nightly. Indications: Restless Legs Syndrome, Disp: , Rfl:  
  fluticasone (FLONASE) 50 mcg/actuation nasal spray, 2 Sprays by Both Nostrils route as needed for Rhinitis., Disp: , Rfl:  
  metroNIDAZOLE (METROCREAM) 0.75 % topical cream, Apply  to affected area nightly. Use a thin layer to affected areas after washing, Disp: , Rfl:  
  verapamil ER (CALAN-SR) 120 mg tablet, Take 120 mg by mouth daily. , Disp: , Rfl:   
Tylenol for pain at times Date Last Reviewed:  11/12/2018 Number of Personal Factors/Comorbidities that affect the Plan of Care: 1-2: MODERATE COMPLEXITY EXAMINATION:  
Observation/Orthostatic Postural Assessment: To PT with straight cane. PT with slow gait and short step length. Limp to R. Pt with decreased knee ext at heel strike and stance. Tends to ambulate with flexed knee. Decreased toe off with decreased knee flexion. Pt with moderate edema. Pt with tight HS. In 90/90, HS are 55. Palpation:   
      Tender medial knee and back of knee Functional Mobility:  
      Gait/Ambulation:  Independent with straight cane. Stairs:  Difficulty with stairs ROM:  
  R knee 0-133 L knee 0-130 hyperext 5 Strength:  
  Knee ext  R 3+/5,  L 4/5 Knee flexion  R 4-/5, L 4/5 Hip flexion R 4-/5,  L 4/5 Hip abd  R 3+/5,  L 4-/5 Special Tests:  
Neurological Screen: Intact to light touch Balance:  Fair Body Structures Involved: 1. Bones 2. Joints 3. Muscles 4. Ligaments Body Functions Affected: 1. Sensory/Pain 2.  Neuromusculoskeletal 
 3. Movement Related Activities and Participation Affected: 1. Learning and Applying Knowledge 2. General Tasks and Demands 3. Mobility 4. Domestic Life 5. Community, Social and [place] Number of elements (examined above) that affect the Plan of Care: 3: MODERATE COMPLEXITY CLINICAL PRESENTATION:  
Presentation: Evolving clinical presentation with changing clinical characteristics: MODERATE COMPLEXITY CLINICAL DECISION MAKING:  
Outcome Measure: Tool Used: Lower Extremity Functional Scale (LEFS) Score:  Initial: 19/80 Most Recent: 47/80 (Date: 11/12/18 ) Interpretation of Score: 20 questions each scored on a 5 point scale with 0 representing \"extreme difficulty or unable to perform\" and 4 representing \"no difficulty\". The lower the score, the greater the functional disability. 80/80 represents no disability. Minimal detectable change is 9 points. Score 80 79-63 62-48 47-32 31-16 15-1 0 Modifier CH CI CJ CK CL CM CN  
 
? Mobility - Walking and Moving Around:  
  - CURRENT STATUS: CK - 40%-59% impaired, limited or restricted  - GOAL STATUS: CK - 40%-59% impaired, limited or restricted  - D/C STATUS:  CK - 40%-59% impaired, limited or restricted Medical Necessity:  
· Patient is expected to demonstrate progress in strength, range of motion and gait and pain  to increase independence with home and community activities . Reason for Services/Other Comments: 
· Patient continues to require skilled intervention due to decreased ROM and strength of R knee and LE. Fair gait . Use of outcome tool(s) and clinical judgement create a POC that gives a: Clear prediction of patient's progress: LOW COMPLEXITY  
  
 
 
 
TREATMENT:  
(In addition to Assessment/Re-Assessment sessions the following treatments were rendered) Pre-treatment Symptoms/Complaints: Patient reports just aching today. Pain: Initial:  
Pain Intensity 1: 2  Post Session:  3 THERAPEUTIC EXERCISE: (25 minutes):  Exercises per grid below to improve mobility, strength and gait. Required moderate verbal and manual cues to promote proper body alignment, promote proper body posture and promote proper body mechanics. Progressed resistance, range and repetitions as indicated. Performed  QS (quadriceps sets), SAQ's (short arc quadriceps), and LAQ's (long arc quadriceps) x 20. Seated HSC at 15 # x 4 sets of 10. Worked on cable for hip flex and abd at 17# and hip ext at 25# x 2 sets of 15. HS and HC stretch. Worked on 6 in   step up and over x 20. Yohan Ann .NuStep x 10 min at level  3. Instructed how to protect arm at home. Manual Therapy ( 30 min  ): Patella and patella tendon mobs. Soft tissue work to [employer] and HS. PROM for knee flex and extn. Sitting knee flexion to 133. Full passive ext after stretching. Therapeutic Modalities: HEP: As directed. [employer] 
 
Treatment/Session Assessment:   Pt is post R TKA on Sept 24,2018. Pt presents with decreased ROM and strength. Pt with improved gait with straight cane and now progressing to no assistive device. No longer using cane in home. .Slow but steady increase in ROM. Good ext. Work on end range flexion. Pain is limiting factor. Pt fell against injured shoulder and now with increased R shoulder pain. Working to increase ROM to normalize gait and increase confidence in gait. Working to increase ROM and strength to enable return to prior activity level. Instructed patient to continue home exercises and stretches as directed. · Response to Treatment:  Understands exercises . · Compliance with Program/Exercises: doing exercises . · Recommendations/Intent for next treatment session: \"Next visit will focus on ROM and strengthening of R knee and LE. Gait training. \". Total Treatment Duration: PT Patient Time In/Time Out Time In: 0900 Time Out: 1000 Treatment number  9 Shawn Marcano PT

## 2018-11-15 ENCOUNTER — APPOINTMENT (OUTPATIENT)
Dept: PHYSICAL THERAPY | Age: 68
End: 2018-11-15
Payer: MEDICARE

## 2018-12-03 ENCOUNTER — HOSPITAL ENCOUNTER (EMERGENCY)
Age: 68
Discharge: HOME | End: 2018-12-03
Attending: EMERGENCY MEDICINE
Payer: MEDICARE

## 2018-12-03 VITALS
SYSTOLIC BLOOD PRESSURE: 195 MMHG | TEMPERATURE: 98 F | WEIGHT: 148 LBS | OXYGEN SATURATION: 100 % | BODY MASS INDEX: 25.27 KG/M2 | RESPIRATION RATE: 16 BRPM | DIASTOLIC BLOOD PRESSURE: 96 MMHG | HEART RATE: 63 BPM | HEIGHT: 64 IN

## 2018-12-03 DIAGNOSIS — I82.431 ACUTE DEEP VEIN THROMBOSIS (DVT) OF POPLITEAL VEIN OF RIGHT LOWER EXTREMITY (HCC): Primary | ICD-10-CM

## 2018-12-03 LAB
ANION GAP SERPL CALC-SCNC: 8 MMOL/L
BUN SERPL-MCNC: 20 MG/DL (ref 8–23)
CALCIUM SERPL-MCNC: 9.5 MG/DL (ref 8.3–10.4)
CHLORIDE SERPL-SCNC: 107 MMOL/L (ref 98–107)
CO2 SERPL-SCNC: 25 MMOL/L (ref 21–32)
CREAT SERPL-MCNC: 1.03 MG/DL (ref 0.6–1)
GLUCOSE SERPL-MCNC: 89 MG/DL (ref 65–100)
POTASSIUM SERPL-SCNC: 4.9 MMOL/L (ref 3.5–5.1)
SODIUM SERPL-SCNC: 140 MMOL/L (ref 136–145)

## 2018-12-03 PROCEDURE — 80048 BASIC METABOLIC PNL TOTAL CA: CPT

## 2018-12-03 PROCEDURE — 99283 EMERGENCY DEPT VISIT LOW MDM: CPT | Performed by: EMERGENCY MEDICINE

## 2018-12-03 PROCEDURE — 74011250637 HC RX REV CODE- 250/637: Performed by: EMERGENCY MEDICINE

## 2018-12-03 RX ORDER — LOSARTAN POTASSIUM 50 MG/1
TABLET ORAL DAILY
COMMUNITY

## 2018-12-03 RX ADMIN — RIVAROXABAN 15 MG: 15 TABLET, FILM COATED ORAL at 19:30

## 2018-12-03 NOTE — ED TRIAGE NOTES
Pt states she has a blood clot behind right knee and was called by Dr. Ewing Side office to come to the ER. Pt has the disc with the ultrasound on it.

## 2018-12-04 NOTE — ED NOTES
Pt states she takes bp meds and will see bp md in am. Did not want bp reported to md and was \"not concerned\". I have reviewed discharge instructions with the spouse. The patient verbalized understanding. Patient left ED via Discharge Method: ambulatory to Home with . Opportunity for questions and clarification provided. Patient given 2 scripts. To continue your aftercare when you leave the hospital, you may receive an automated call from our care team to check in on how you are doing. This is a free service and part of our promise to provide the best care and service to meet your aftercare needs.  If you have questions, or wish to unsubscribe from this service please call [phone]. Thank you for Choosing our [facility] Department.

## 2018-12-04 NOTE — DISCHARGE INSTRUCTIONS
Learning About Deep Vein Thrombosis  What is deep vein thrombosis? A deep vein thrombosis (DVT) is a blood clot in certain veins of the legs, pelvis, or arms. The clot is usually in the legs. DVT may damage the vein and cause the area to ache, swell, and change color. DVT also can lead to sores. DVT in these veins needs to be treated because the clots can get bigger, break loose, and travel through the bloodstream to the lungs. A blood clot in a lung can cause death. Blood clots can form in the veins when you are not active for a long period of time. For example, they can form if you need to stay in bed because of a health problem or must sit for a long time on an airplane or in a car. Surgery or an injury can damage your blood vessels and cause a clot to form. Cancer also can cause DVT. And some people have blood that clots too easily, which is a problem that may run in families. A risk factor is something that makes you more likely to develop a disease. Here are some major risk factors for DVT:  · You have surgery. · You have to stay in bed for more than 3 days (such as in the hospital). · Your blood is likely to clot because of an injury, cancer, or inherited condition. Here are some minor risk factors for DVT:  · You take birth control hormones. · You are pregnant. · You are in a car or airplane for a long trip. What are the symptoms? Symptoms of DVT may include:  · Swelling in the affected area. · Redness and warmth in the affected area. · Pain or tenderness. You may have pain only when you touch the affected area or when you stand or walk. If your doctor thinks you may have DVT, you will probably have an ultrasound test. You may have other tests as well. How can you prevent DVT? · Exercise your lower leg muscles to help blood flow in your legs. Point your toes up toward your head so the calves of your legs are stretched, then relax and repeat.  This is a good exercise to do when you are sitting for long periods of time. · Get out of bed as soon as you can after an illness or surgery. If you need to stay in bed, do the leg exercise noted above every hour when you are awake. · Use special stockings called compression stockings. These stockings are tight at the feet with a gradually looser fit on the leg. Many doctors recommend that you wear compression stockings during a journey longer than 8 hours. · Take breaks when you are on long trips. Stop the car and walk around. On long airplane flights, walk up and down the aisle hourly, and flex and point your feet every 20 minutes while sitting. · Take blood-thinning medicines after some types of surgery if your doctor recommends it. Blood thinners also may be used if you are likely to develop clots. How is DVT treated? Treatment for DVT usually involves taking blood thinners. These medicines are given through a vein (intravenously, or IV) or as a pill. Talk with your doctor about which medicine is right for you. Your doctor also may suggest that you prop up or elevate your leg when possible, take walks, and wear compression stockings. These measures may help reduce the pain and swelling that can happen with DVT. Follow-up care is a key part of your treatment and safety. Be sure to make and go to all appointments, and call your doctor if you are having problems. It's also a good idea to know your test results and keep a list of the medicines you take. Where can you learn more? Go to http://velia-johana.info/. Enter E035 in the search box to learn more about \"Learning About Deep Vein Thrombosis. \"  Current as of: November 21, 2017  Content Version: 11.8  © 2517-4617 [employer]. Care instructions adapted under license by [employer] (which disclaims liability or warranty for this information).  If you have questions about a medical condition or this instruction, always ask your healthcare professional. [employer] disclaims any warranty or liability for your use of this information.

## 2018-12-10 NOTE — ED PROVIDER NOTES
Chief complaint : new, 1st time dvt HISTORY OF PRESENT ILLNESS : 
Location : rigt popliteal vein Quality : mild aching Quantity : constant Timing : a week or do, found today on office ordered doppler us Severity : mild Alleviating / exacerbating factors : no cp/sob Associated Symptoms : mild leg edema Just had right tka in september Past Medical History:  
Diagnosis Date  Arthritis  Chronic pain  Environmental allergies  Hypertension  Ill-defined condition   
 pt reports some numbness, nerve discomfort lower leg -controlled with medication  Migraine  Psychiatric disorder   
 pt takes med for anxiety, depression  RLS (restless legs syndrome)  Rosacea Past Surgical History:  
Procedure Laterality Date [address] UNLISTED  HX APPENDECTOMY  HX DILATION AND CURETTAGE History reviewed. No pertinent family history. Social History Socioeconomic History  Marital status:  Spouse name: Not on file  Number of children: Not on file  Years of education: Not on file  Highest education level: Not on file Social Needs  Financial resource strain: Not on file  Food insecurity - worry: Not on file  Food insecurity - inability: Not on file  Transportation needs - medical: Not on file  Transportation needs - non-medical: Not on file Occupational History  Not on file Tobacco Use  Smoking status: Never Smoker  Smokeless tobacco: Never Used Substance and Sexual Activity  Alcohol use: Yes Comment: occ  Drug use: No  
 Sexual activity: Not on file Other Topics Concern  Not on file Social History Narrative  Not on file ALLERGIES: Pcn [penicillins] Review of Systems Respiratory: Negative for shortness of breath. Cardiovascular: Positive for leg swelling. Negative for chest pain. Musculoskeletal: Positive for neck pain. Vitals: 12/03/18 1730 12/03/18 1923 12/03/18 1924 12/03/18 1945 BP: (!) 177/95 192/86  (!) 195/96 Pulse: 64   63 Resp: 16   16 Temp: 98 °F (36.7 °C)   98 °F (36.7 °C) SpO2: 99%  98% 100% Weight: 67.1 kg (148 lb) Height: 5' 4\" (1.626 m) Physical Exam  
Constitutional: She is oriented to person, place, and time. She appears well-developed and well-nourished. No distress. HENT:  
Head: Normocephalic and atraumatic. Eyes: Conjunctivae and EOM are normal. Right eye exhibits no discharge. Left eye exhibits no discharge. Neck: Normal range of motion. Neck supple. Pulmonary/Chest: Effort normal. No respiratory distress. Musculoskeletal: Normal range of motion. She exhibits edema and tenderness. Legs: 
Neurological: She is alert and oriented to person, place, and time. She has normal strength. She exhibits normal muscle tone. cni 2-12 grossly Nl gait, Nl speech Skin: Skin is warm and dry. No rash noted. She is not diaphoretic. Psychiatric: She has a normal mood and affect. Her behavior is normal.  
Nursing note and vitals reviewed. MDM Number of Diagnoses or Management Options Acute deep vein thrombosis (DVT) of popliteal vein of right lower extremity [facility]):  
Diagnosis management comments: Medical decision making note: 
dvt [facility] Indications to return discussed This concludes the \"medical decision making note\" part of this emergency department visit note. Procedures

## 2018-12-13 ENCOUNTER — HOSPITAL ENCOUNTER (OUTPATIENT)
Dept: PHYSICAL THERAPY | Age: 68
Discharge: HOME | End: 2018-12-13
Payer: MEDICARE

## 2018-12-13 ENCOUNTER — APPOINTMENT (OUTPATIENT)
Dept: PHYSICAL THERAPY | Age: 68
End: 2018-12-13

## 2018-12-13 PROCEDURE — G8978 MOBILITY CURRENT STATUS: HCPCS

## 2018-12-13 PROCEDURE — 97140 MANUAL THERAPY 1/> REGIONS: CPT

## 2018-12-13 PROCEDURE — G8979 MOBILITY GOAL STATUS: HCPCS

## 2018-12-13 PROCEDURE — 97110 THERAPEUTIC EXERCISES: CPT

## 2018-12-14 NOTE — PROGRESS NOTES
Marcell Gowers  : 1950  [place] / [place] / Product Type: Medicare /  [address]  at [facility] Therapy  [address], [address]  [phone]   [phone]         OUTPATIENT PHYSICAL THERAPY:Daily Note and Progress Report 2018    ICD-10: Treatment Diagnosis:   R26.2 Difficulty in walking, not elsewhere classified     M25.661 Stiffness of right knee, not elsewhere classified     M25.561 Pain in right knee     Precautions/Allergies:   Pcn [penicillins]   Fall Risk Score: 1 (? 5 = High Risk)  MD Orders: Eval and treat MEDICAL/REFERRING DIAGNOSIS:  Presence of right artificial knee joint [Z96.651]   DATE OF ONSET:   REFERRING PHYSICIAN: Alissa Suarez, *  RETURN PHYSICIAN APPOINTMENT: 2 weeks      INITIAL ASSESSMENT:  Ms. Ritesh Bejnamin presents with decreased ROM and strength post R TKA on 18. Pt with fair gait with straight cane. Will need to work to increase ROM to normalize gait. Will need to work to increase ROM and strength to enable return to prior activity level. Pt is anxious to walk normally. Should progress well. PROBLEM LIST (Impacting functional limitations):  1. Decreased Strength  2. Decreased ADL/Functional Activities  3. Decreased Ambulation Ability/Technique  4. Decreased Balance  5. Increased Pain  6. Decreased Flexibility/Joint Mobility  7. Edema/Girth  8. Decreased [place] with Home Exercise Program INTERVENTIONS PLANNED:  1. Gait Training  2. Home Exercise Program (HEP)  3. Manual Therapy  4. Range of Motion (ROM)  5. Therapeutic Activites  6. Therapeutic Exercise/Strengthening   TREATMENT PLAN:  Effective Dates: 10/16/2018 TO 1/15/2019 (90 days). Frequency/Duration: 2 times a week for 90and upon reassessment, will adjust frequency and duration as progress indicates.         GOALS: (Goals have been discussed and agreed upon with patient.)  Short-Term Functional Goals: Time Frame: 30 days   1. Establish independent HEP with no cueing to increase ROM and strength MET  2. Increase R knee ROM to 0-115 to increase ease of sitting and ambulation  MET  3. Increase LE strength to enable initiation of reciprocal pattern on stairs   MET  4. Able to ambulate 10-15 min with minimal to no increase in R knee pain . ONGOING. Discharge Goals: Time Frame: 90 days  1. Improve score on LEFS by 9 points to enable prolonged sitting, standing and ambulation MET  2. Increase R knee ROM to 0-125 to normalize gait ONGOING  3. Increase R LE strength 1/2 to 1 grade to enable reciprocal pattern on stairs ONGOING  4. Able to ambulate 20-30 min with minimal to no increase in R knee pain  ONGOING    NEW GOAL  1. Decrease sensitivity of R knee to decrease pain   Rehabilitation Potential For Stated Goals: Good              The information in this section was collected on 10/16/18 (except where otherwise noted). HISTORY:   History of Present Injury/Illness (Reason for Referral):  Pt is post R TKA on Sept 24,2018. Pt had Home PT until 10//13/18. Pt presents with decreased ROM and strength of R knee and LE. Pt with fair gait. Pt is referred to outpatient PT for aggressive ROM and strengthening of R knee and LE. Past Medical History/Comorbidities:   Ms. Marcie Rosario  has a past medical history of Arthritis, Chronic pain, Environmental allergies, Hypertension, Ill-defined condition, Migraine, Psychiatric disorder, RLS (restless legs syndrome), and Rosacea. Ms. Marcie Rosario  has a past surgical history that includes pr abdomen surgery proc unlisted; hx appendectomy; hx dilation and curettage; and KNEE ARTHROPLASTY TOTAL/ RIGHT/ (Right, 9/24/2018). Social History/Living Environment:     Lives with spouse in 2 [place] home   Prior Level of Function/Work/Activity:  Retired. Cares for home.   Wants to walk normally and without pain   Dominant Side:         RIGHT  Current Medications:       Current Outpatient Medications:     losartan (COZAAR) 50 mg tablet, Take  by mouth daily. , Disp: , Rfl:     rivaroxaban (XARELTO) 15 mg tab tablet, Take 1 Tab by mouth two (2) times daily (with meals). , Disp: 42 Tab, Rfl: 0    rivaroxaban (XARELTO) 20 mg tab tablet, Take 1 Tab by mouth daily (with breakfast). , Disp: 31 Tab, Rfl: 11    FLUoxetine (PROZAC) 10 mg capsule, Take 10 mg by mouth daily. , Disp: , Rfl:     gabapentin (NEURONTIN) 300 mg capsule, Take 300 mg by mouth nightly., Disp: , Rfl:     cetirizine (ZYRTEC) 10 mg tablet, Take  by mouth nightly., Disp: , Rfl:     rOPINIRole (REQUIP) 1 mg tablet, Take 1 mg by mouth nightly. Indications: Restless Legs Syndrome, Disp: , Rfl:     fluticasone (FLONASE) 50 mcg/actuation nasal spray, 2 Sprays by Both Nostrils route as needed for Rhinitis., Disp: , Rfl:     metroNIDAZOLE (METROCREAM) 0.75 % topical cream, Apply  to affected area nightly. Use a thin layer to affected areas after washing, Disp: , Rfl:    Tylenol for pain at times    Date Last Reviewed:  12/13/2018   Number of Personal Factors/Comorbidities that affect the Plan of Care: 1-2: MODERATE COMPLEXITY   EXAMINATION:   Observation/Orthostatic Postural Assessment: To PT without assistive device  PT with slow gait. Heel toe gait however she is holding R limb stiff. Limp to R. Pt with decreased knee ext at heel strike and stance. Tends to ambulate with knee. slightly flexed   Pt with mild edema. Pt with tight HS. In 90/90, HS are 60.     Palpation:          Tender medial knee and back of knee   Functional Mobility:         Gait/Ambulation:  Independent          Stairs:  Difficulty with going down stairs   ROM:     R knee 0-125   L knee 0-130 hyperext 5                                    Strength:     Knee ext  R 4-/5,  L 4/5  Knee flexion  R 4-/5, L 4/5         Hip flexion R 4/5,  L 4/5  Hip abd  R 4-/5,  L 4-/5            Special Tests:   Neurological Screen: Intact to light touch   Balance: Fair    Body Structures Involved:  1. Bones  2. Joints  3. Muscles  4. Ligaments Body Functions Affected:  1. Sensory/Pain  2. Neuromusculoskeletal  3. Movement Related Activities and Participation Affected:  1. Learning and Applying Knowledge  2. General Tasks and Demands  3. Mobility  4. Domestic Life  5. Community, Social and [place]   Number of elements (examined above) that affect the Plan of Care: 3: MODERATE COMPLEXITY   CLINICAL PRESENTATION:   Presentation: Evolving clinical presentation with changing clinical characteristics: MODERATE COMPLEXITY   CLINICAL DECISION MAKING:   Outcome Measure: Tool Used: Lower Extremity Functional Scale (LEFS)  Score:  Initial: 19/80 Most Recent: 49/80 (Date: 12/13/18 )   Interpretation of Score: 20 questions each scored on a 5 point scale with 0 representing \"extreme difficulty or unable to perform\" and 4 representing \"no difficulty\". The lower the score, the greater the functional disability. 80/80 represents no disability. Minimal detectable change is 9 points. Score 80 79-63 62-48 47-32 31-16 15-1 0   Modifier CH CI CJ CK CL CM CN     ? Mobility - Walking and Moving Around:     - CURRENT STATUS: CJ - 20%-39% impaired, limited or restricted    - GOAL STATUS: CK - 40%-59% impaired, limited or restricted    - D/C STATUS:  ---------------To be determined---------------    Medical Necessity:   · Patient is expected to demonstrate progress in strength, range of motion and gait and pain  to increase independence with home and community activities . Reason for Services/Other Comments:  · Patient continues to require skilled intervention due to decreased ROM and strength of R knee and LE. Fair gait .    Use of outcome tool(s) and clinical judgement create a POC that gives a: Clear prediction of patient's progress: LOW COMPLEXITY            TREATMENT:   (In addition to Assessment/Re-Assessment sessions the following treatments were rendered)  Pre-treatment Symptoms/Complaints: Patient reports just aching today. Pain: Initial:   Pain Intensity 1: 6  Post Session:  3     THERAPEUTIC EXERCISE: (25 minutes):  Exercises per grid below to improve mobility, strength and gait. Required moderate verbal and manual cues to promote proper body alignment, promote proper body posture and promote proper body mechanics. Progressed resistance, range and repetitions as indicated. Performed  QS (quadriceps sets), SAQ's (short arc quadriceps), and LAQ's (long arc quadriceps) x 20. Seated HSC at 15 # x 4 sets of 10. Worked on cable for hip flex and abd at 17# and hip ext at 25# x 2 sets of 15. HS and HC stretch. Worked on 6 in   step up and over x 20. [place] .NuStep x 10 min at level  3. Instructed how to protect arm at home. Manual Therapy ( 35 min  ): Patella and patella tendon mobs. Soft tissue work to [employer] and HS. PROM for knee flex and extn. Sitting knee flexion to 133. Full passive ext after stretching. Therapeutic Modalities:    HEP: As directed. [employer] Portal    Treatment/Session Assessment:   Pt is post R TKA on Sept 24,2018. Pt presents with decreased ROM and strength. Pt with improved gait with straight cane and now progressing to no assistive device. No longer using cane in home. .Slow but steady increase in ROM. Good ext. Work on end range flexion. Pain is limiting factor. Pt fell against injured shoulder and now with increased R shoulder pain. Working to increase ROM to normalize gait and increase confidence in gait. Working to increase ROM and strength to enable return to prior activity level. Instructed patient to continue home exercises and stretches as directed. · Response to Treatment:  Understands exercises . · Compliance with Program/Exercises: doing exercises . · Recommendations/Intent for next treatment session: \"Next visit will focus on ROM and strengthening of R knee and LE. Gait training. \".   Total Treatment Duration:  PT Patient Time In/Time Out  Time In: 0900  Time Out: 1000  Treatment number  [facility],

## 2018-12-20 ENCOUNTER — HOSPITAL ENCOUNTER (OUTPATIENT)
Dept: PHYSICAL THERAPY | Age: 68
Discharge: HOME | End: 2018-12-20
Payer: MEDICARE

## 2018-12-20 PROCEDURE — 97110 THERAPEUTIC EXERCISES: CPT

## 2018-12-20 PROCEDURE — 97140 MANUAL THERAPY 1/> REGIONS: CPT

## 2018-12-21 NOTE — PROGRESS NOTES
Tiffany Angel  : 1950  [place] / Product Type: Medicare /  [address]  at [place] [address] Therapy  [address] [place] [address], [facility] [address]  [phone]   [phone]         OUTPATIENT PHYSICAL THERAPY:Daily Note 2018    ICD-10: Treatment Diagnosis:   R26.2 Difficulty in walking, not elsewhere classified     M25.661 Stiffness of right knee, not elsewhere classified     M25.561 Pain in right knee     Precautions/Allergies:   Pcn [penicillins]   Fall Risk Score: 1 (? 5 = High Risk)  MD Orders: Eval and treat MEDICAL/REFERRING DIAGNOSIS:  Presence of right artificial knee joint [Z96.651]   DATE OF ONSET:   REFERRING PHYSICIAN: Giselle Villanueva., *  RETURN PHYSICIAN APPOINTMENT: 2 weeks      INITIAL ASSESSMENT:  Ms. Joyce Gillespie presents with decreased ROM and strength post R TKA on 18. Pt with fair gait with straight cane. Will need to work to increase ROM to normalize gait. Will need to work to increase ROM and strength to enable return to prior activity level. Pt is anxious to walk normally. Should progress well. PROBLEM LIST (Impacting functional limitations):  1. Decreased Strength  2. Decreased ADL/Functional Activities  3. Decreased Ambulation Ability/Technique  4. Decreased Balance  5. Increased Pain  6. Decreased Flexibility/Joint Mobility  7. Edema/Girth  8. Decreased [place] with Home Exercise Program INTERVENTIONS PLANNED:  1. Gait Training  2. Home Exercise Program (HEP)  3. Manual Therapy  4. Range of Motion (ROM)  5. Therapeutic Activites  6. Therapeutic Exercise/Strengthening   TREATMENT PLAN:  Effective Dates: 10/16/2018 TO 1/15/2019 (90 days). Frequency/Duration: 2 times a week for 90and upon reassessment, will adjust frequency and duration as progress indicates.         GOALS: (Goals have been discussed and agreed upon with patient.)  Short-Term Functional Goals: Time Frame: 30 days   1. Establish independent HEP with no cueing to increase ROM and strength MET  2. Increase R knee ROM to 0-115 to increase ease of sitting and ambulation  MET  3. Increase LE strength to enable initiation of reciprocal pattern on stairs   MET  4. Able to ambulate 10-15 min with minimal to no increase in R knee pain . ONGOING. Discharge Goals: Time Frame: 90 days  1. Improve score on LEFS by 9 points to enable prolonged sitting, standing and ambulation MET  2. Increase R knee ROM to 0-125 to normalize gait ONGOING  3. Increase R LE strength 1/2 to 1 grade to enable reciprocal pattern on stairs ONGOING  4. Able to ambulate 20-30 min with minimal to no increase in R knee pain  ONGOING    NEW GOAL  1. Decrease sensitivity of R knee to decrease pain   Rehabilitation Potential For Stated Goals: Good              The information in this section was collected on 10/16/18 (except where otherwise noted). HISTORY:   History of Present Injury/Illness (Reason for Referral):  Pt is post R TKA on Sept 24,2018. Pt had Home PT until 10//13/18. Pt presents with decreased ROM and strength of R knee and LE. Pt with fair gait. Pt is referred to outpatient PT for aggressive ROM and strengthening of R knee and LE. Past Medical History/Comorbidities:   Ms. Negar [place]  has a past medical history of Arthritis, Chronic pain, Environmental allergies, Hypertension, Ill-defined condition, Migraine, Psychiatric disorder, RLS (restless legs syndrome), and Rosacea. Ms. Negar [place]  has a past surgical history that includes pr abdomen surgery proc unlisted; hx appendectomy; hx dilation and curettage; and KNEE ARTHROPLASTY TOTAL/ RIGHT/ (Right, 9/24/2018). Social History/Living Environment:     Lives with spouse in 2 [place] home   Prior Level of Function/Work/Activity:  Retired. Cares for home.   Wants to walk normally and without pain   Dominant Side:         RIGHT  Current Medications:       Current Outpatient Medications:   losartan (COZAAR) 50 mg tablet, Take  by mouth daily. , Disp: , Rfl:     rivaroxaban (XARELTO) 15 mg tab tablet, Take 1 Tab by mouth two (2) times daily (with meals). , Disp: 42 Tab, Rfl: 0    rivaroxaban (XARELTO) 20 mg tab tablet, Take 1 Tab by mouth daily (with breakfast). , Disp: 31 Tab, Rfl: 11    FLUoxetine (PROZAC) 10 mg capsule, Take 10 mg by mouth daily. , Disp: , Rfl:     gabapentin (NEURONTIN) 300 mg capsule, Take 300 mg by mouth nightly., Disp: , Rfl:     cetirizine (ZYRTEC) 10 mg tablet, Take  by mouth nightly., Disp: , Rfl:     rOPINIRole (REQUIP) 1 mg tablet, Take 1 mg by mouth nightly. Indications: Restless Legs Syndrome, Disp: , Rfl:     fluticasone (FLONASE) 50 mcg/actuation nasal spray, 2 Sprays by Both Nostrils route as needed for Rhinitis., Disp: , Rfl:     metroNIDAZOLE (METROCREAM) 0.75 % topical cream, Apply  to affected area nightly. Use a thin layer to affected areas after washing, Disp: , Rfl:    Tylenol for pain at times    Date Last Reviewed:  12/20/2018   Number of Personal Factors/Comorbidities that affect the Plan of Care: 1-2: MODERATE COMPLEXITY   EXAMINATION:   Observation/Orthostatic Postural Assessment: To PT without assistive device  PT with slow gait. Heel toe gait however she is holding R limb stiff. Limp to R. Pt with decreased knee ext at heel strike and stance. Tends to ambulate with knee. slightly flexed   Pt with mild edema. Pt with tight HS. In 90/90, HS are 60.     Palpation:          Tender medial knee and back of knee   Functional Mobility:         Gait/Ambulation:  Independent          Stairs:  Difficulty with going down stairs   ROM:     R knee 0-125   L knee 0-130 hyperext 5                                    Strength:     Knee ext  R 4-/5,  L 4/5  Knee flexion  R 4-/5, L 4/5         Hip flexion R 4/5,  L 4/5  Hip abd  R 4-/5,  L 4-/5            Special Tests:   Neurological Screen: Intact to light touch   Balance:  Fair    Body Structures Involved:  1. Bones  2. Joints  3. Muscles  4. Ligaments Body Functions Affected:  1. Sensory/Pain  2. Neuromusculoskeletal  3. Movement Related Activities and Participation Affected:  1. Learning and Applying Knowledge  2. General Tasks and Demands  3. Mobility  4. Domestic Life  5. Community, Social and [place]   Number of elements (examined above) that affect the Plan of Care: 3: MODERATE COMPLEXITY   CLINICAL PRESENTATION:   Presentation: Evolving clinical presentation with changing clinical characteristics: MODERATE COMPLEXITY   CLINICAL DECISION MAKING:   Outcome Measure: Tool Used: Lower Extremity Functional Scale (LEFS)  Score:  Initial: 19/80 Most Recent: 49/80 (Date: 12/13/18 )   Interpretation of Score: 20 questions each scored on a 5 point scale with 0 representing \"extreme difficulty or unable to perform\" and 4 representing \"no difficulty\". The lower the score, the greater the functional disability. 80/80 represents no disability. Minimal detectable change is 9 points. Score 80 79-63 62-48 47-32 31-16 15-1 0   Modifier CH CI CJ CK CL CM CN     ? Mobility - Walking and Moving Around:     - CURRENT STATUS: CJ - 20%-39% impaired, limited or restricted    - GOAL STATUS: CK - 40%-59% impaired, limited or restricted    - D/C STATUS:  ---------------To be determined---------------    Medical Necessity:   · Patient is expected to demonstrate progress in strength, range of motion and gait and pain  to increase independence with home and community activities . Reason for Services/Other Comments:  · Patient continues to require skilled intervention due to decreased ROM and strength of R knee and LE. Fair gait .    Use of outcome tool(s) and clinical judgement create a POC that gives a: Clear prediction of patient's progress: LOW COMPLEXITY            TREATMENT:   (In addition to Assessment/Re-Assessment sessions the following treatments were rendered)  Pre-treatment Symptoms/Complaints: Patient reports just aching today. Pain: Initial:   Pain Intensity 1: 4  Post Session:  3     THERAPEUTIC EXERCISE: (25 minutes):  Exercises per grid below to improve mobility, strength and gait. Required moderate verbal and manual cues to promote proper body alignment, promote proper body posture and promote proper body mechanics. Progressed resistance, range and repetitions as indicated. Performed  QS (quadriceps sets), SAQ's (short arc quadriceps), and LAQ's (long arc quadriceps) x 20. Seated HSC at 15 # x 4 sets of 10. Worked on cable for hip flex and abd at 17# and hip ext at 25# x 2 sets of 15. HS and HC stretch. Worked on 6 in   step up and over x 20. Jenet Lanius .NuStep x 10 min at level  3. Instructed how to protect arm at home. Manual Therapy ( 35 min  ): Patella and patella tendon mobs. Soft tissue work to [employer] and HS. PROM for knee flex and extn. Sitting knee flexion to 133. Full passive ext after stretching. Therapeutic Modalities:    HEP: As directed. [facility]    Treatment/Session Assessment:   Pt is post R TKA on Sept 24,2018. Pt presents with decreased ROM and strength. Pt with improved gait with straight cane and now progressing to no assistive device. No longer using cane in home. .Slow but steady increase in ROM. Good ext. Work on end range flexion. Pain is limiting factor. Pt fell against injured shoulder and now with increased R shoulder pain. Working to increase ROM to normalize gait and increase confidence in gait. Working to increase ROM and strength to enable return to prior activity level. Instructed patient to continue home exercises and stretches as directed. · Response to Treatment:  Understands exercises . · Compliance with Program/Exercises: doing exercises . · Recommendations/Intent for next treatment session: \"Next visit will focus on ROM and strengthening of R knee and LE. Gait training. \".   Total Treatment Duration:  PT Patient Time In/Time Out  Time In: 1000  Time Out: 1100  Treatment number  [facility], PT

## 2019-01-09 ENCOUNTER — HOSPITAL ENCOUNTER (OUTPATIENT)
Dept: PHYSICAL THERAPY | Age: 69
Discharge: HOME | End: 2019-01-09
Payer: MEDICARE

## 2019-01-09 PROCEDURE — 97140 MANUAL THERAPY 1/> REGIONS: CPT

## 2019-01-09 PROCEDURE — 97110 THERAPEUTIC EXERCISES: CPT

## 2019-01-10 NOTE — PROGRESS NOTES
Estee Echeverria  : 1950  [place] / Product Type: Medicare /  [address]  at [address]  [address] [place] [address], [facility] [address]  [phone]   [phone]         OUTPATIENT PHYSICAL THERAPY:Daily Note 2019    ICD-10: Treatment Diagnosis:   R26.2 Difficulty in walking, not elsewhere classified     M25.661 Stiffness of right knee, not elsewhere classified     M25.561 Pain in right knee     Precautions/Allergies:   Pcn [penicillins]   Fall Risk Score: 1 (? 5 = High Risk)  MD Orders: Eval and treat MEDICAL/REFERRING DIAGNOSIS:  Presence of right artificial knee joint [Z96.651]   DATE OF ONSET:   REFERRING PHYSICIAN: Rishabh Paulino., *  RETURN PHYSICIAN APPOINTMENT: 2 weeks      INITIAL ASSESSMENT:  Ms. Kay Waldron presents with decreased ROM and strength post R TKA on 18. Pt with fair gait with straight cane. Will need to work to increase ROM to normalize gait. Will need to work to increase ROM and strength to enable return to prior activity level. Pt is anxious to walk normally. Should progress well. PROBLEM LIST (Impacting functional limitations):  1. Decreased Strength  2. Decreased ADL/Functional Activities  3. Decreased Ambulation Ability/Technique  4. Decreased Balance  5. Increased Pain  6. Decreased Flexibility/Joint Mobility  7. Edema/Girth  8. Decreased [place] with Home Exercise Program INTERVENTIONS PLANNED:  1. Gait Training  2. Home Exercise Program (HEP)  3. Manual Therapy  4. Range of Motion (ROM)  5. Therapeutic Activites  6. Therapeutic Exercise/Strengthening   TREATMENT PLAN:  Effective Dates: 10/16/2018 TO 1/15/2019 (90 days). Frequency/Duration: 2 times a week for 90and upon reassessment, will adjust frequency and duration as progress indicates.         GOALS: (Goals have been discussed and agreed upon with patient.)  Short-Term Functional Goals: Time Frame: 30 days   1. Establish independent HEP with no cueing to increase ROM and strength MET  2. Increase R knee ROM to 0-115 to increase ease of sitting and ambulation  MET  3. Increase LE strength to enable initiation of reciprocal pattern on stairs   MET  4. Able to ambulate 10-15 min with minimal to no increase in R knee pain . ONGOING. Discharge Goals: Time Frame: 90 days  1. Improve score on LEFS by 9 points to enable prolonged sitting, standing and ambulation MET  2. Increase R knee ROM to 0-125 to normalize gait ONGOING  3. Increase R LE strength 1/2 to 1 grade to enable reciprocal pattern on stairs ONGOING  4. Able to ambulate 20-30 min with minimal to no increase in R knee pain  ONGOING    NEW GOAL  1. Decrease sensitivity of R knee to decrease pain   Rehabilitation Potential For Stated Goals: Good              The information in this section was collected on 10/16/18 (except where otherwise noted). HISTORY:   History of Present Injury/Illness (Reason for Referral):  Pt is post R TKA on Sept 24,2018. Pt had Home PT until 10//13/18. Pt presents with decreased ROM and strength of R knee and LE. Pt with fair gait. Pt is referred to outpatient PT for aggressive ROM and strengthening of R knee and LE. Past Medical History/Comorbidities:   Ms. Aria Suazo  has a past medical history of Arthritis, Chronic pain, Environmental allergies, Hypertension, Ill-defined condition, Migraine, Psychiatric disorder, RLS (restless legs syndrome), and Rosacea. Ms. Aria Suazo  has a past surgical history that includes pr abdomen surgery proc unlisted; hx appendectomy; hx dilation and curettage; and KNEE ARTHROPLASTY TOTAL/ RIGHT/ (Right, 9/24/2018). Social History/Living Environment:     Lives with spouse in 2 [place] home   Prior Level of Function/Work/Activity:  Retired. Cares for home.   Wants to walk normally and without pain   Dominant Side:         RIGHT  Current Medications:       Current Outpatient Medications:    losartan (COZAAR) 50 mg tablet, Take  by mouth daily. , Disp: , Rfl:     rivaroxaban (XARELTO) 15 mg tab tablet, Take 1 Tab by mouth two (2) times daily (with meals). , Disp: 42 Tab, Rfl: 0    rivaroxaban (XARELTO) 20 mg tab tablet, Take 1 Tab by mouth daily (with breakfast). , Disp: 31 Tab, Rfl: 11    FLUoxetine (PROZAC) 10 mg capsule, Take 10 mg by mouth daily. , Disp: , Rfl:     gabapentin (NEURONTIN) 300 mg capsule, Take 300 mg by mouth nightly., Disp: , Rfl:     cetirizine (ZYRTEC) 10 mg tablet, Take  by mouth nightly., Disp: , Rfl:     rOPINIRole (REQUIP) 1 mg tablet, Take 1 mg by mouth nightly. Indications: Restless Legs Syndrome, Disp: , Rfl:     fluticasone (FLONASE) 50 mcg/actuation nasal spray, 2 Sprays by Both Nostrils route as needed for Rhinitis., Disp: , Rfl:     metroNIDAZOLE (METROCREAM) 0.75 % topical cream, Apply  to affected area nightly. Use a thin layer to affected areas after washing, Disp: , Rfl:    Tylenol for pain at times    Date Last Reviewed:  1/9/2019   Number of Personal Factors/Comorbidities that affect the Plan of Care: 1-2: MODERATE COMPLEXITY   EXAMINATION:   Observation/Orthostatic Postural Assessment: To PT without assistive device  PT with slow gait. Heel toe gait however she is holding R limb stiff. Limp to R. Pt with decreased knee ext at heel strike and stance. Tends to ambulate with knee. slightly flexed   Pt with mild edema. Pt with tight HS. In 90/90, HS are 60.     Palpation:          Tender medial knee and back of knee   Functional Mobility:         Gait/Ambulation:  Independent          Stairs:  Difficulty with going down stairs   ROM:     R knee 0-125   L knee 0-130 hyperext 5                                    Strength:     Knee ext  R 4-/5,  L 4/5  Knee flexion  R 4-/5, L 4/5         Hip flexion R 4/5,  L 4/5  Hip abd  R 4-/5,  L 4-/5            Special Tests:   Neurological Screen: Intact to light touch   Balance:  Fair    Body Structures Involved:  1. Bones  2. Joints  3. Muscles  4. Ligaments Body Functions Affected:  1. Sensory/Pain  2. Neuromusculoskeletal  3. Movement Related Activities and Participation Affected:  1. Learning and Applying Knowledge  2. General Tasks and Demands  3. Mobility  4. Domestic Life  5. Community, Social and [place]   Number of elements (examined above) that affect the Plan of Care: 3: MODERATE COMPLEXITY   CLINICAL PRESENTATION:   Presentation: Evolving clinical presentation with changing clinical characteristics: MODERATE COMPLEXITY   CLINICAL DECISION MAKING:   Outcome Measure: Tool Used: Lower Extremity Functional Scale (LEFS)  Score:  Initial: 19/80 Most Recent: 49/80 (Date: 12/13/18 )   Interpretation of Score: 20 questions each scored on a 5 point scale with 0 representing \"extreme difficulty or unable to perform\" and 4 representing \"no difficulty\". The lower the score, the greater the functional disability. 80/80 represents no disability. Minimal detectable change is 9 points. Score 80 79-63 62-48 47-32 31-16 15-1 0   Modifier CH CI CJ CK CL CM CN     ? Mobility - Walking and Moving Around:     - CURRENT STATUS: CJ - 20%-39% impaired, limited or restricted    - GOAL STATUS: CK - 40%-59% impaired, limited or restricted    - D/C STATUS:  ---------------To be determined---------------    Medical Necessity:   · Patient is expected to demonstrate progress in strength, range of motion and gait and pain  to increase independence with home and community activities . Reason for Services/Other Comments:  · Patient continues to require skilled intervention due to decreased ROM and strength of R knee and LE. Fair gait .    Use of outcome tool(s) and clinical judgement create a POC that gives a: Clear prediction of patient's progress: LOW COMPLEXITY            TREATMENT:   (In addition to Assessment/Re-Assessment sessions the following treatments were rendered)  Pre-treatment Symptoms/Complaints: Patient reports less pain with use of  Gabapentin      Pain: Initial:   Pain Intensity 1: 3  Post Session:  2     THERAPEUTIC EXERCISE: (25 minutes):  Exercises per grid below to improve mobility, strength and gait. Required moderate verbal and manual cues to promote proper body alignment, promote proper body posture and promote proper body mechanics. Progressed resistance, range and repetitions as indicated. Performed  QS (quadriceps sets), SAQ's (short arc quadriceps), and LAQ's (long arc quadriceps) x 20. Seated HSC at 15 # x 4 sets of 10. Worked on cable for hip flex and abd at 17# and hip ext at 25# x 2 sets of 15. HS and HC stretch. Worked on 6 in   step up and over x 20. Lorrain Passey .NuStep x 10 min at level  3. Manual Therapy ( 35 min  ): Patella and patella tendon mobs. Soft tissue work to [employer] and HS. PROM for knee flex and extn. Sitting knee flexion to 135. Full passive ext after stretching. Therapeutic Modalities:    HEP: As directed. [facility] Portal    Treatment/Session Assessment:   Pt is post R TKA on Sept 24,2018. Pt presented with decreased ROM and strength. Pt with improved gait without assistive device. Less pain with use of gabapentin and soft tissue work. Less sensitivity at R LE. Lorrain Passey .Slow but steady increase in ROM. Good ext. Pain is limiting factor but is steadily decreasing. Pt fell against injured shoulder and now with increased R shoulder pain. She is to have surgery in March. Working to increase ROM to normalize gait and increase confidence in gait. Working to increase ROM and strength to enable return to prior activity level. Instructed patient to continue home exercises and stretches as directed. · Response to Treatment:  Understands exercises . · Compliance with Program/Exercises: doing exercises . · Recommendations/Intent for next treatment session: \"Next visit will focus on ROM and strengthening of R knee and LE. Gait training. \".   Total Treatment Duration:  PT Patient Time In/Time Out  Time In: 1100  Time Out: 1200  Treatment number  [id number] Micah Mueller PT

## 2019-01-11 ENCOUNTER — HOSPITAL ENCOUNTER (OUTPATIENT)
Dept: PHYSICAL THERAPY | Age: 69
Discharge: HOME | End: 2019-01-11
Payer: MEDICARE

## 2019-01-11 ENCOUNTER — HOSPITAL ENCOUNTER (OUTPATIENT)
Dept: ULTRASOUND IMAGING | Age: 69
Discharge: HOME | End: 2019-01-11
Attending: INTERNAL MEDICINE
Payer: MEDICARE

## 2019-01-11 DIAGNOSIS — I82.409 DVT OF LOWER EXTREMITY (DEEP VENOUS THROMBOSIS) (HCC): ICD-10-CM

## 2019-01-11 PROCEDURE — 97140 MANUAL THERAPY 1/> REGIONS: CPT

## 2019-01-11 PROCEDURE — 93971 EXTREMITY STUDY: CPT

## 2019-01-11 PROCEDURE — 97110 THERAPEUTIC EXERCISES: CPT

## 2019-01-11 NOTE — PROGRESS NOTES
Diana Oconnor  : 1950  [place] / Product Type: Medicare /  [address]  at [address], [address]   [phone]   [phone]         OUTPATIENT PHYSICAL THERAPY:Daily Note 2019    ICD-10: Treatment Diagnosis:   R26.2 Difficulty in walking, not elsewhere classified     M25.661 Stiffness of right knee, not elsewhere classified     M25.561 Pain in right knee     Precautions/Allergies:   Pcn [penicillins]   Fall Risk Score: 1 (? 5 = High Risk)  MD Orders: Eval and treat MEDICAL/REFERRING DIAGNOSIS:  Presence of right artificial knee joint [Z96.651]   DATE OF ONSET:   REFERRING PHYSICIAN: Lennie Malagon., *  RETURN PHYSICIAN APPOINTMENT: 2 weeks      INITIAL ASSESSMENT:  Ms. Fortino Medina presents with decreased ROM and strength post R TKA on 18. Pt with fair gait with straight cane. Will need to work to increase ROM to normalize gait. Will need to work to increase ROM and strength to enable return to prior activity level. Pt is anxious to walk normally. Should progress well. PROBLEM LIST (Impacting functional limitations):  1. Decreased Strength  2. Decreased ADL/Functional Activities  3. Decreased Ambulation Ability/Technique  4. Decreased Balance  5. Increased Pain  6. Decreased Flexibility/Joint Mobility  7. Edema/Girth  8. Decreased [place] with Home Exercise Program INTERVENTIONS PLANNED:  1. Gait Training  2. Home Exercise Program (HEP)  3. Manual Therapy  4. Range of Motion (ROM)  5. Therapeutic Activites  6. Therapeutic Exercise/Strengthening   TREATMENT PLAN:  Effective Dates: 10/16/2018 TO 1/15/2019 (90 days). Frequency/Duration: 2 times a week for 90and upon reassessment, will adjust frequency and duration as progress indicates.         GOALS: (Goals have been discussed and agreed upon with patient.)  Short-Term Functional Goals: Time Frame: 30 days   1. Establish independent HEP with no cueing to increase ROM and strength MET  2. Increase R knee ROM to 0-115 to increase ease of sitting and ambulation  MET  3. Increase LE strength to enable initiation of reciprocal pattern on stairs   MET  4. Able to ambulate 10-15 min with minimal to no increase in R knee pain . ONGOING. Discharge Goals: Time Frame: 90 days  1. Improve score on LEFS by 9 points to enable prolonged sitting, standing and ambulation MET  2. Increase R knee ROM to 0-125 to normalize gait ONGOING  3. Increase R LE strength 1/2 to 1 grade to enable reciprocal pattern on stairs ONGOING  4. Able to ambulate 20-30 min with minimal to no increase in R knee pain  ONGOING    NEW GOAL  1. Decrease sensitivity of R knee to decrease pain   Rehabilitation Potential For Stated Goals: Good              The information in this section was collected on 10/16/18 (except where otherwise noted). HISTORY:   History of Present Injury/Illness (Reason for Referral):  Pt is post R TKA on Sept 24,2018. Pt had Home PT until 10//13/18. Pt presents with decreased ROM and strength of R knee and LE. Pt with fair gait. Pt is referred to outpatient PT for aggressive ROM and strengthening of R knee and LE. Past Medical History/Comorbidities:   Ms. Roberta Mcwilliams  has a past medical history of Arthritis, Chronic pain, Environmental allergies, Hypertension, Ill-defined condition, Migraine, Psychiatric disorder, RLS (restless legs syndrome), and Rosacea. Ms. Roberta Mcwilliams  has a past surgical history that includes pr abdomen surgery proc unlisted; hx appendectomy; hx dilation and curettage; and KNEE ARTHROPLASTY TOTAL/ RIGHT/ (Right, 9/24/2018). Social History/Living Environment:     Lives with spouse in 2 [place] home   Prior Level of Function/Work/Activity:  Retired. Cares for home.   Wants to walk normally and without pain   Dominant Side:         RIGHT  Current Medications:       Current Outpatient Medications:    losartan (COZAAR) 50 mg tablet, Take  by mouth daily. , Disp: , Rfl:     rivaroxaban (XARELTO) 15 mg tab tablet, Take 1 Tab by mouth two (2) times daily (with meals). , Disp: 42 Tab, Rfl: 0    rivaroxaban (XARELTO) 20 mg tab tablet, Take 1 Tab by mouth daily (with breakfast). , Disp: 31 Tab, Rfl: 11    FLUoxetine (PROZAC) 10 mg capsule, Take 10 mg by mouth daily. , Disp: , Rfl:     gabapentin (NEURONTIN) 300 mg capsule, Take 300 mg by mouth nightly., Disp: , Rfl:     cetirizine (ZYRTEC) 10 mg tablet, Take  by mouth nightly., Disp: , Rfl:     rOPINIRole (REQUIP) 1 mg tablet, Take 1 mg by mouth nightly. Indications: Restless Legs Syndrome, Disp: , Rfl:     fluticasone (FLONASE) 50 mcg/actuation nasal spray, 2 Sprays by Both Nostrils route as needed for Rhinitis., Disp: , Rfl:     metroNIDAZOLE (METROCREAM) 0.75 % topical cream, Apply  to affected area nightly. Use a thin layer to affected areas after washing, Disp: , Rfl:    Tylenol for pain at times    Date Last Reviewed:  1/11/2019   Number of Personal Factors/Comorbidities that affect the Plan of Care: 1-2: MODERATE COMPLEXITY   EXAMINATION:   Observation/Orthostatic Postural Assessment: To PT without assistive device  PT with slow gait. Heel toe gait however she is holding R limb stiff. Limp to R. Pt with decreased knee ext at heel strike and stance. Tends to ambulate with knee. slightly flexed   Pt with mild edema. Pt with tight HS. In 90/90, HS are 60.     Palpation:          Tender medial knee and back of knee   Functional Mobility:         Gait/Ambulation:  Independent          Stairs:  Difficulty with going down stairs   ROM:     R knee 0-125   L knee 0-130 hyperext 5                                    Strength:     Knee ext  R 4-/5,  L 4/5  Knee flexion  R 4-/5, L 4/5         Hip flexion R 4/5,  L 4/5  Hip abd  R 4-/5,  L 4-/5            Special Tests:   Neurological Screen: Intact to light touch   Balance:  Fair    Body Structures Involved:  1. Bones  2. Joints  3. Muscles  4. Ligaments Body Functions Affected:  1. Sensory/Pain  2. Neuromusculoskeletal  3. Movement Related Activities and Participation Affected:  1. Learning and Applying Knowledge  2. General Tasks and Demands  3. Mobility  4. Domestic Life  5. Community, Social and [place]   Number of elements (examined above) that affect the Plan of Care: 3: MODERATE COMPLEXITY   CLINICAL PRESENTATION:   Presentation: Evolving clinical presentation with changing clinical characteristics: MODERATE COMPLEXITY   CLINICAL DECISION MAKING:   Outcome Measure: Tool Used: Lower Extremity Functional Scale (LEFS)  Score:  Initial: 19/80 Most Recent: 49/80 (Date: 12/13/18 )   Interpretation of Score: 20 questions each scored on a 5 point scale with 0 representing \"extreme difficulty or unable to perform\" and 4 representing \"no difficulty\". The lower the score, the greater the functional disability. 80/80 represents no disability. Minimal detectable change is 9 points. Score 80 79-63 62-48 47-32 31-16 15-1 0   Modifier CH CI CJ CK CL CM CN     ? Mobility - Walking and Moving Around:     - CURRENT STATUS: CJ - 20%-39% impaired, limited or restricted    - GOAL STATUS: CK - 40%-59% impaired, limited or restricted    - D/C STATUS:  ---------------To be determined---------------    Medical Necessity:   · Patient is expected to demonstrate progress in strength, range of motion and gait and pain  to increase independence with home and community activities . Reason for Services/Other Comments:  · Patient continues to require skilled intervention due to decreased ROM and strength of R knee and LE. Fair gait .    Use of outcome tool(s) and clinical judgement create a POC that gives a: Clear prediction of patient's progress: LOW COMPLEXITY            TREATMENT:   (In addition to Assessment/Re-Assessment sessions the following treatments were rendered)  Pre-treatment Symptoms/Complaints: Patient reports less pain with use of  Gabapentin      Pain: Initial:   Pain Intensity 1: 2  Post Session:  2     THERAPEUTIC EXERCISE: (25 minutes):  Exercises per grid below to improve mobility, strength and gait. Required moderate verbal and manual cues to promote proper body alignment, promote proper body posture and promote proper body mechanics. Progressed resistance, range and repetitions as indicated. Performed  QS (quadriceps sets), SAQ's (short arc quadriceps), and LAQ's (long arc quadriceps) x 20. Seated HSC at 15 # x 4 sets of 10. Worked on cable for hip flex and abd at 17# and hip ext at 25# x 2 sets of 15. HS and HC stretch. Worked on 6 in   step up and over x 20. Artist Hazy .NuStep x 10 min at level  3. Manual Therapy ( 35 min  ): Patella and patella tendon mobs. Soft tissue work to [employer] and HS. PROM for knee flex and extn. Sitting knee flexion to 135. Full passive ext after stretching. Therapeutic Modalities:    HEP: As directed. [facility] Portal    Treatment/Session Assessment:   Pt is post R TKA on Sept 24,2018. Pt presented with decreased ROM and strength. Pt with improved gait without assistive device. Less pain with use of gabapentin and soft tissue work. Less sensitivity at R LE. Artist Debbie .Slow but steady increase in ROM. Good ext. Pain is limiting factor but is steadily decreasing. Pt fell against injured shoulder and now with increased R shoulder pain. She is to have surgery in March. Working to increase ROM to normalize gait and increase confidence in gait. Working to increase ROM and strength to enable return to prior activity level. Instructed patient to continue home exercises and stretches as directed. · Response to Treatment:  Understands exercises . · Compliance with Program/Exercises: doing exercises . · Recommendations/Intent for next treatment session: \"Next visit will focus on ROM and strengthening of R knee and LE. Gait training. \".   Total Treatment Duration:  PT Patient Time In/Time Out  Time In: 0300  Time Out: 0400  Treatment number  12  Lilia Acuna, PT

## 2019-01-30 ENCOUNTER — HOSPITAL ENCOUNTER (OUTPATIENT)
Dept: SLEEP MEDICINE | Age: 69
Discharge: HOME | End: 2019-01-30
Payer: MEDICARE

## 2019-01-30 PROCEDURE — 95806 SLEEP STUDY UNATT&RESP EFFT: CPT

## 2019-03-04 ENCOUNTER — HOSPITAL ENCOUNTER (OUTPATIENT)
Dept: ULTRASOUND IMAGING | Age: 69
Discharge: HOME | End: 2019-03-04
Attending: INTERNAL MEDICINE
Payer: MEDICARE

## 2019-03-04 DIAGNOSIS — I82.409 DVT OF LOWER EXTREMITY (DEEP VENOUS THROMBOSIS) (HCC): ICD-10-CM

## 2019-03-04 PROCEDURE — 93971 EXTREMITY STUDY: CPT

## 2019-03-05 ENCOUNTER — HOSPITAL ENCOUNTER (OUTPATIENT)
Dept: LAB | Age: 69
Discharge: HOME | End: 2019-03-05
Payer: MEDICARE

## 2019-03-05 ENCOUNTER — HOSPITAL ENCOUNTER (OUTPATIENT)
Dept: SLEEP MEDICINE | Age: 69
Discharge: HOME | End: 2019-03-05
Payer: MEDICARE

## 2019-03-05 DIAGNOSIS — I82.431 DEEP VEIN THROMBOSIS (DVT) OF POPLITEAL VEIN OF RIGHT LOWER EXTREMITY, UNSPECIFIED CHRONICITY (HCC): ICD-10-CM

## 2019-03-05 LAB
ALBUMIN SERPL-MCNC: 3.6 G/DL (ref 3.2–4.6)
ALBUMIN/GLOB SERPL: 1 {RATIO} (ref 1.2–3.5)
ALP SERPL-CCNC: 82 U/L (ref 50–136)
ALT SERPL-CCNC: 14 U/L (ref 12–65)
ANION GAP SERPL CALC-SCNC: 2 MMOL/L (ref 7–16)
AST SERPL-CCNC: 9 U/L (ref 15–37)
BASOPHILS # BLD: 0.1 K/UL (ref 0–0.2)
BASOPHILS NFR BLD: 1 % (ref 0–2)
BILIRUB SERPL-MCNC: 0.7 MG/DL (ref 0.2–1.1)
BUN SERPL-MCNC: 23 MG/DL (ref 8–23)
CALCIUM SERPL-MCNC: 8.8 MG/DL (ref 8.3–10.4)
CHLORIDE SERPL-SCNC: 108 MMOL/L (ref 98–107)
CO2 SERPL-SCNC: 30 MMOL/L (ref 21–32)
CREAT SERPL-MCNC: 0.81 MG/DL (ref 0.6–1)
DIFFERENTIAL METHOD BLD: NORMAL
EOSINOPHIL # BLD: 0.3 K/UL (ref 0–0.8)
EOSINOPHIL NFR BLD: 5 % (ref 0.5–7.8)
ERYTHROCYTE [DISTWIDTH] IN BLOOD BY AUTOMATED COUNT: 13.9 % (ref 11.9–14.6)
GLOBULIN SER CALC-MCNC: 3.5 G/DL (ref 2.3–3.5)
GLUCOSE SERPL-MCNC: 116 MG/DL (ref 65–100)
HCT VFR BLD AUTO: 37.1 % (ref 35.8–46.3)
HGB BLD-MCNC: 11.8 G/DL (ref 11.7–15.4)
IMM GRANULOCYTES # BLD AUTO: 0 K/UL (ref 0–0.5)
IMM GRANULOCYTES NFR BLD AUTO: 1 % (ref 0–5)
LYMPHOCYTES # BLD: 1.3 K/UL (ref 0.5–4.6)
LYMPHOCYTES NFR BLD: 21 % (ref 13–44)
MCH RBC QN AUTO: 28.9 PG (ref 26.1–32.9)
MCHC RBC AUTO-ENTMCNC: 31.8 G/DL (ref 31.4–35)
MCV RBC AUTO: 90.7 FL (ref 79.6–97.8)
MONOCYTES # BLD: 0.5 K/UL (ref 0.1–1.3)
MONOCYTES NFR BLD: 9 % (ref 4–12)
NEUTS SEG # BLD: 3.9 K/UL (ref 1.7–8.2)
NEUTS SEG NFR BLD: 63 % (ref 43–78)
NRBC # BLD: 0 K/UL (ref 0–0.2)
PLATELET # BLD AUTO: 247 K/UL (ref 150–450)
PMV BLD AUTO: 9.6 FL (ref 9.4–12.3)
POTASSIUM SERPL-SCNC: 4.4 MMOL/L (ref 3.5–5.1)
PROT SERPL-MCNC: 7.1 G/DL (ref 6.3–8.2)
RBC # BLD AUTO: 4.09 M/UL (ref 4.05–5.25)
SODIUM SERPL-SCNC: 140 MMOL/L (ref 136–145)
WBC # BLD AUTO: 6.1 K/UL (ref 4.3–11.1)

## 2019-03-05 PROCEDURE — 36415 COLL VENOUS BLD VENIPUNCTURE: CPT

## 2019-03-05 PROCEDURE — 95811 POLYSOM 6/>YRS CPAP 4/> PARM: CPT

## 2019-03-05 PROCEDURE — 80053 COMPREHEN METABOLIC PANEL: CPT

## 2019-03-05 PROCEDURE — 85025 COMPLETE CBC W/AUTO DIFF WBC: CPT

## 2019-03-08 RX ORDER — CLINDAMYCIN PHOSPHATE 900 MG/50ML
900 INJECTION INTRAVENOUS ONCE
Status: CANCELLED | OUTPATIENT
Start: 2019-03-11 | End: 2019-03-11

## 2019-03-10 ENCOUNTER — ANESTHESIA EVENT (OUTPATIENT)
Dept: SURGERY | Age: 69
End: 2019-03-10
Payer: MEDICARE

## 2019-03-11 ENCOUNTER — ANESTHESIA (OUTPATIENT)
Dept: SURGERY | Age: 69
End: 2019-03-11
Payer: MEDICARE

## 2019-03-11 ENCOUNTER — HOSPITAL ENCOUNTER (OUTPATIENT)
Age: 69
Setting detail: OUTPATIENT SURGERY
Discharge: HOME | End: 2019-03-11
Attending: ORTHOPAEDIC SURGERY | Admitting: ORTHOPAEDIC SURGERY
Payer: MEDICARE

## 2019-03-11 VITALS
SYSTOLIC BLOOD PRESSURE: 151 MMHG | OXYGEN SATURATION: 96 % | BODY MASS INDEX: 26.57 KG/M2 | HEART RATE: 77 BPM | DIASTOLIC BLOOD PRESSURE: 78 MMHG | WEIGHT: 150 LBS | RESPIRATION RATE: 16 BRPM | TEMPERATURE: 97.7 F

## 2019-03-11 PROCEDURE — 77030035258: Performed by: ORTHOPAEDIC SURGERY

## 2019-03-11 PROCEDURE — 77030016370 HC SUT ULTBRD S&N -B: Performed by: ORTHOPAEDIC SURGERY

## 2019-03-11 PROCEDURE — 74011000250 HC RX REV CODE- 250: Performed by: ORTHOPAEDIC SURGERY

## 2019-03-11 PROCEDURE — 77030033005 HC TBNG ARTHSC PMP STRY -B: Performed by: ORTHOPAEDIC SURGERY

## 2019-03-11 PROCEDURE — 77030006891 HC BLD SHV RESECT STRY -B: Performed by: ORTHOPAEDIC SURGERY

## 2019-03-11 PROCEDURE — 77030033138 HC SUT PGA STRATFX J&J -B: Performed by: ORTHOPAEDIC SURGERY

## 2019-03-11 PROCEDURE — 77030033073 HC TBNG ARTHSC PMP OUTFLO STRY -B: Performed by: ORTHOPAEDIC SURGERY

## 2019-03-11 PROCEDURE — C1713 ANCHOR/SCREW BN/BN,TIS/BN: HCPCS | Performed by: ORTHOPAEDIC SURGERY

## 2019-03-11 PROCEDURE — 74011250636 HC RX REV CODE- 250/636: Performed by: ANESTHESIOLOGY

## 2019-03-11 PROCEDURE — 77030013367: Performed by: ORTHOPAEDIC SURGERY

## 2019-03-11 PROCEDURE — 76010010054 HC POST OP PAIN BLOCK: Performed by: ORTHOPAEDIC SURGERY

## 2019-03-11 PROCEDURE — 74011000250 HC RX REV CODE- 250

## 2019-03-11 PROCEDURE — 77030002933 HC SUT MCRYL J&J -A: Performed by: ORTHOPAEDIC SURGERY

## 2019-03-11 PROCEDURE — 77030027384 HC PRB ARTHSCP SERFAS STRY -C: Performed by: ORTHOPAEDIC SURGERY

## 2019-03-11 PROCEDURE — 77030003666 HC NDL SPINAL BD -A: Performed by: ORTHOPAEDIC SURGERY

## 2019-03-11 PROCEDURE — 74011250636 HC RX REV CODE- 250/636

## 2019-03-11 PROCEDURE — 76210000063 HC OR PH I REC FIRST 0.5 HR: Performed by: ORTHOPAEDIC SURGERY

## 2019-03-11 PROCEDURE — 74011250636 HC RX REV CODE- 250/636: Performed by: ORTHOPAEDIC SURGERY

## 2019-03-11 PROCEDURE — 77030037088 HC TUBE ENDOTRACH ORAL NSL COVD-A: Performed by: ANESTHESIOLOGY

## 2019-03-11 PROCEDURE — 77030034139 HC NDL ENDOSC TRUPASS SGL S&N -C: Performed by: ORTHOPAEDIC SURGERY

## 2019-03-11 PROCEDURE — 76060000036 HC ANESTHESIA 2.5 TO 3 HR: Performed by: ORTHOPAEDIC SURGERY

## 2019-03-11 PROCEDURE — 77030039425 HC BLD LARYNG TRULITE DISP TELE -A: Performed by: ANESTHESIOLOGY

## 2019-03-11 PROCEDURE — 77030018836 HC SOL IRR NACL ICUM -A: Performed by: ORTHOPAEDIC SURGERY

## 2019-03-11 PROCEDURE — 77030019605: Performed by: ORTHOPAEDIC SURGERY

## 2019-03-11 PROCEDURE — 76942 ECHO GUIDE FOR BIOPSY: CPT | Performed by: ORTHOPAEDIC SURGERY

## 2019-03-11 PROCEDURE — 77030018673: Performed by: ORTHOPAEDIC SURGERY

## 2019-03-11 PROCEDURE — 77030016570 HC BLNKT BAIR HGGR 3M -B: Performed by: ANESTHESIOLOGY

## 2019-03-11 PROCEDURE — 77030003602 HC NDL NRV BLK BBMI -B: Performed by: ANESTHESIOLOGY

## 2019-03-11 PROCEDURE — 76210000020 HC REC RM PH II FIRST 0.5 HR: Performed by: ORTHOPAEDIC SURGERY

## 2019-03-11 PROCEDURE — 77030002966 HC SUT PDS J&J -A: Performed by: ORTHOPAEDIC SURGERY

## 2019-03-11 PROCEDURE — 76010000172 HC OR TIME 2.5 TO 3 HR INTENSV-TIER 1: Performed by: ORTHOPAEDIC SURGERY

## 2019-03-11 PROCEDURE — 77030004453 HC BUR SHV STRY -B: Performed by: ORTHOPAEDIC SURGERY

## 2019-03-11 PROCEDURE — 77030037400 HC ADH TISS HI VISC EXOFIN CHMP -B: Performed by: ORTHOPAEDIC SURGERY

## 2019-03-11 PROCEDURE — 77030032490 HC SLV COMPR SCD KNE COVD -B: Performed by: ORTHOPAEDIC SURGERY

## 2019-03-11 PROCEDURE — 77030002916 HC SUT ETHLN J&J -A: Performed by: ORTHOPAEDIC SURGERY

## 2019-03-11 PROCEDURE — 77030037278 HC KT QFIX DRL GDE OBTR DISP -C: Performed by: ORTHOPAEDIC SURGERY

## 2019-03-11 DEVICE — MULTIFIX S-ULTRA 5.5MM KNOTLESS ANCHOR
Type: IMPLANTABLE DEVICE | Site: SHOULDER | Status: FUNCTIONAL
Brand: MULTIFIX

## 2019-03-11 DEVICE — HEALICOIL PK 5.5 MM SUTURE ANCHOR                                    WITH ONE ULTRATAPE SUTURE BLUE AND                                    ONE ULTRABRAID SUTURE NO.2
Type: IMPLANTABLE DEVICE | Site: SHOULDER | Status: FUNCTIONAL
Brand: HEALICOIL

## 2019-03-11 DEVICE — HEALICOIL PK 5.5 MM SUTURE ANCHOR                                    WITH ONE ULTRATAPE SUTURE COBRAID                                    BLUE AND ONE ULTRABRAID SUTURE NO.2
Type: IMPLANTABLE DEVICE | Site: SHOULDER | Status: FUNCTIONAL
Brand: HEALICOIL

## 2019-03-11 DEVICE — ICONIX 2 NEEDLES WITH INTELLIBRAID TECHNOLOGY, 2.3MM ANCHOR WITH 2 STRANDS #2 FORCE FIBER
Type: IMPLANTABLE DEVICE | Site: SHOULDER | Status: FUNCTIONAL
Brand: ICONIX

## 2019-03-11 RX ORDER — SODIUM CHLORIDE 0.9 % (FLUSH) 0.9 %
5-40 SYRINGE (ML) INJECTION EVERY 8 HOURS
Status: DISCONTINUED | OUTPATIENT
Start: 2019-03-11 | End: 2019-03-11 | Stop reason: HOSPADM

## 2019-03-11 RX ORDER — SODIUM CHLORIDE 0.9 % (FLUSH) 0.9 %
5-40 SYRINGE (ML) INJECTION AS NEEDED
Status: DISCONTINUED | OUTPATIENT
Start: 2019-03-11 | End: 2019-03-11 | Stop reason: HOSPADM

## 2019-03-11 RX ORDER — ROPIVACAINE HYDROCHLORIDE 5 MG/ML
INJECTION, SOLUTION EPIDURAL; INFILTRATION; PERINEURAL
Status: COMPLETED | OUTPATIENT
Start: 2019-03-11 | End: 2019-03-11

## 2019-03-11 RX ORDER — NALOXONE HYDROCHLORIDE 0.4 MG/ML
0.1 INJECTION, SOLUTION INTRAMUSCULAR; INTRAVENOUS; SUBCUTANEOUS AS NEEDED
Status: DISCONTINUED | OUTPATIENT
Start: 2019-03-11 | End: 2019-03-11 | Stop reason: HOSPADM

## 2019-03-11 RX ORDER — DIPHENHYDRAMINE HYDROCHLORIDE 50 MG/ML
12.5 INJECTION, SOLUTION INTRAMUSCULAR; INTRAVENOUS ONCE
Status: DISCONTINUED | OUTPATIENT
Start: 2019-03-11 | End: 2019-03-11 | Stop reason: HOSPADM

## 2019-03-11 RX ORDER — LIDOCAINE HYDROCHLORIDE 10 MG/ML
0.1 INJECTION INFILTRATION; PERINEURAL AS NEEDED
Status: DISCONTINUED | OUTPATIENT
Start: 2019-03-11 | End: 2019-03-11 | Stop reason: HOSPADM

## 2019-03-11 RX ORDER — ONDANSETRON 2 MG/ML
4 INJECTION INTRAMUSCULAR; INTRAVENOUS ONCE
Status: DISCONTINUED | OUTPATIENT
Start: 2019-03-11 | End: 2019-03-11 | Stop reason: HOSPADM

## 2019-03-11 RX ORDER — ACETAMINOPHEN 500 MG
500 TABLET ORAL ONCE
Status: DISCONTINUED | OUTPATIENT
Start: 2019-03-11 | End: 2019-03-11 | Stop reason: HOSPADM

## 2019-03-11 RX ORDER — SODIUM CHLORIDE, SODIUM LACTATE, POTASSIUM CHLORIDE, CALCIUM CHLORIDE 600; 310; 30; 20 MG/100ML; MG/100ML; MG/100ML; MG/100ML
1000 INJECTION, SOLUTION INTRAVENOUS CONTINUOUS
Status: DISCONTINUED | OUTPATIENT
Start: 2019-03-11 | End: 2019-03-11 | Stop reason: HOSPADM

## 2019-03-11 RX ORDER — OXYCODONE HYDROCHLORIDE 5 MG/1
10 TABLET ORAL
Status: DISCONTINUED | OUTPATIENT
Start: 2019-03-11 | End: 2019-03-11 | Stop reason: HOSPADM

## 2019-03-11 RX ORDER — EPHEDRINE SULFATE 50 MG/ML
INJECTION, SOLUTION INTRAVENOUS AS NEEDED
Status: DISCONTINUED | OUTPATIENT
Start: 2019-03-11 | End: 2019-03-11 | Stop reason: HOSPADM

## 2019-03-11 RX ORDER — CLINDAMYCIN PHOSPHATE 900 MG/50ML
900 INJECTION INTRAVENOUS ONCE
Status: COMPLETED | OUTPATIENT
Start: 2019-03-11 | End: 2019-03-11

## 2019-03-11 RX ORDER — SODIUM CHLORIDE, SODIUM LACTATE, POTASSIUM CHLORIDE, CALCIUM CHLORIDE 600; 310; 30; 20 MG/100ML; MG/100ML; MG/100ML; MG/100ML
75 INJECTION, SOLUTION INTRAVENOUS CONTINUOUS
Status: DISCONTINUED | OUTPATIENT
Start: 2019-03-11 | End: 2019-03-11 | Stop reason: HOSPADM

## 2019-03-11 RX ORDER — EPINEPHRINE 1 MG/ML
INJECTION, SOLUTION, CONCENTRATE INTRAVENOUS AS NEEDED
Status: DISCONTINUED | OUTPATIENT
Start: 2019-03-11 | End: 2019-03-11 | Stop reason: HOSPADM

## 2019-03-11 RX ORDER — ROCURONIUM BROMIDE 10 MG/ML
INJECTION, SOLUTION INTRAVENOUS AS NEEDED
Status: DISCONTINUED | OUTPATIENT
Start: 2019-03-11 | End: 2019-03-11 | Stop reason: HOSPADM

## 2019-03-11 RX ORDER — LIDOCAINE HYDROCHLORIDE AND EPINEPHRINE 5; 5 MG/ML; UG/ML
INJECTION, SOLUTION INFILTRATION; PERINEURAL AS NEEDED
Status: DISCONTINUED | OUTPATIENT
Start: 2019-03-11 | End: 2019-03-11 | Stop reason: HOSPADM

## 2019-03-11 RX ORDER — PROPOFOL 10 MG/ML
INJECTION, EMULSION INTRAVENOUS AS NEEDED
Status: DISCONTINUED | OUTPATIENT
Start: 2019-03-11 | End: 2019-03-11 | Stop reason: HOSPADM

## 2019-03-11 RX ORDER — OXYCODONE HYDROCHLORIDE 5 MG/1
5 TABLET ORAL
Status: DISCONTINUED | OUTPATIENT
Start: 2019-03-11 | End: 2019-03-11 | Stop reason: HOSPADM

## 2019-03-11 RX ORDER — FENTANYL CITRATE 50 UG/ML
100 INJECTION, SOLUTION INTRAMUSCULAR; INTRAVENOUS AS NEEDED
Status: DISCONTINUED | OUTPATIENT
Start: 2019-03-11 | End: 2019-03-11 | Stop reason: HOSPADM

## 2019-03-11 RX ORDER — SUCCINYLCHOLINE CHLORIDE 20 MG/ML
INJECTION INTRAMUSCULAR; INTRAVENOUS AS NEEDED
Status: DISCONTINUED | OUTPATIENT
Start: 2019-03-11 | End: 2019-03-11 | Stop reason: HOSPADM

## 2019-03-11 RX ORDER — MIDAZOLAM HYDROCHLORIDE 1 MG/ML
2 INJECTION, SOLUTION INTRAMUSCULAR; INTRAVENOUS
Status: COMPLETED | OUTPATIENT
Start: 2019-03-11 | End: 2019-03-11

## 2019-03-11 RX ORDER — LIDOCAINE HYDROCHLORIDE 20 MG/ML
INJECTION, SOLUTION EPIDURAL; INFILTRATION; INTRACAUDAL; PERINEURAL AS NEEDED
Status: DISCONTINUED | OUTPATIENT
Start: 2019-03-11 | End: 2019-03-11 | Stop reason: HOSPADM

## 2019-03-11 RX ORDER — HYDROMORPHONE HYDROCHLORIDE 2 MG/ML
0.5 INJECTION, SOLUTION INTRAMUSCULAR; INTRAVENOUS; SUBCUTANEOUS
Status: DISCONTINUED | OUTPATIENT
Start: 2019-03-11 | End: 2019-03-11 | Stop reason: HOSPADM

## 2019-03-11 RX ORDER — DEXAMETHASONE SODIUM PHOSPHATE 4 MG/ML
INJECTION, SOLUTION INTRA-ARTICULAR; INTRALESIONAL; INTRAMUSCULAR; INTRAVENOUS; SOFT TISSUE AS NEEDED
Status: DISCONTINUED | OUTPATIENT
Start: 2019-03-11 | End: 2019-03-11 | Stop reason: HOSPADM

## 2019-03-11 RX ORDER — ONDANSETRON 2 MG/ML
INJECTION INTRAMUSCULAR; INTRAVENOUS AS NEEDED
Status: DISCONTINUED | OUTPATIENT
Start: 2019-03-11 | End: 2019-03-11 | Stop reason: HOSPADM

## 2019-03-11 RX ADMIN — MIDAZOLAM HYDROCHLORIDE 2 MG: 2 INJECTION, SOLUTION INTRAMUSCULAR; INTRAVENOUS at 09:18

## 2019-03-11 RX ADMIN — PROPOFOL 200 MG: 10 INJECTION, EMULSION INTRAVENOUS at 10:37

## 2019-03-11 RX ADMIN — SUCCINYLCHOLINE CHLORIDE 140 MG: 20 INJECTION INTRAMUSCULAR; INTRAVENOUS at 10:38

## 2019-03-11 RX ADMIN — CLINDAMYCIN PHOSPHATE 900 MG: 900 INJECTION, SOLUTION INTRAVENOUS at 10:34

## 2019-03-11 RX ADMIN — SODIUM CHLORIDE, SODIUM LACTATE, POTASSIUM CHLORIDE, AND CALCIUM CHLORIDE 1000 ML: 600; 310; 30; 20 INJECTION, SOLUTION INTRAVENOUS at 10:00

## 2019-03-11 RX ADMIN — ROPIVACAINE HYDROCHLORIDE 30 ML: 5 INJECTION, SOLUTION EPIDURAL; INFILTRATION; PERINEURAL at 09:23

## 2019-03-11 RX ADMIN — EPHEDRINE SULFATE 5 MG: 50 INJECTION, SOLUTION INTRAVENOUS at 11:37

## 2019-03-11 RX ADMIN — ONDANSETRON 4 MG: 2 INJECTION INTRAMUSCULAR; INTRAVENOUS at 12:35

## 2019-03-11 RX ADMIN — EPHEDRINE SULFATE 5 MG: 50 INJECTION, SOLUTION INTRAVENOUS at 11:24

## 2019-03-11 RX ADMIN — EPHEDRINE SULFATE 10 MG: 50 INJECTION, SOLUTION INTRAVENOUS at 10:59

## 2019-03-11 RX ADMIN — EPHEDRINE SULFATE 7.5 MG: 50 INJECTION, SOLUTION INTRAVENOUS at 11:01

## 2019-03-11 RX ADMIN — FENTANYL CITRATE 100 MCG: 50 INJECTION INTRAMUSCULAR; INTRAVENOUS at 09:18

## 2019-03-11 RX ADMIN — LIDOCAINE HYDROCHLORIDE 80 MG: 20 INJECTION, SOLUTION EPIDURAL; INFILTRATION; INTRACAUDAL; PERINEURAL at 10:37

## 2019-03-11 RX ADMIN — DEXAMETHASONE SODIUM PHOSPHATE 4 MG: 4 INJECTION, SOLUTION INTRA-ARTICULAR; INTRALESIONAL; INTRAMUSCULAR; INTRAVENOUS; SOFT TISSUE at 11:13

## 2019-03-11 RX ADMIN — ROCURONIUM BROMIDE 7.5 MG: 10 INJECTION, SOLUTION INTRAVENOUS at 10:37

## 2019-03-11 NOTE — ANESTHESIA PROCEDURE NOTES
Peripheral Block    Start time: 3/11/2019 9:18 AM  End time: 3/11/2019 9:21 AM  Performed by: Amy Looney MD  Authorized by: Amy Looney MD       Pre-procedure: Indications: at surgeon's request, post-op pain management and procedure for pain    Preanesthetic Checklist: patient identified, risks and benefits discussed, site marked, timeout performed, anesthesia consent given and patient being monitored    Timeout Time: 09:18          Block Type:   Block Type:   Interscalene  Laterality:  Right  Monitoring:  Standard ASA monitoring, responsive to questions, oxygen, continuous pulse ox, frequent vital sign checks and heart rate  Injection Technique:  Single shot  Procedures: ultrasound guided and nerve stimulator    Patient Position: seated  Prep: chlorhexidine    Location:  Interscalene  Needle Type:  Stimuplex  Needle Gauge:  22 G  Needle Localization:  Ultrasound guidance and nerve stimulator  Motor Response: minimal motor response >0.4 mA      Assessment:  Number of attempts:  1  Injection Assessment:  Incremental injection every 5 mL, no paresthesia, negative aspiration for CSF, local visualized surrounding nerve on ultrasound, negative aspiration for blood, no intravascular symptoms and ultrasound image on chart  Patient tolerance:  Patient tolerated the procedure well with no immediate complications

## 2019-03-11 NOTE — PROGRESS NOTES
Escobar Lal  : 1950  [place] / Product Type: Medicare /  [address]  at [place] [address] Therapy  [address], [address]  [phone]   [phone]         OUTPATIENT PHYSICAL THERAPY:Discontinuation Summary 3/10/2019    ICD-10: Treatment Diagnosis:   R26.2 Difficulty in walking, not elsewhere classified     M25.661 Stiffness of right knee, not elsewhere classified     M25.561 Pain in right knee     Precautions/Allergies:   Pcn [penicillins]   Fall Risk Score: 1 (? 5 = High Risk)  MD Orders: Eval and treat MEDICAL/REFERRING DIAGNOSIS:  Presence of right artificial knee joint [Z96.651]   DATE OF ONSET:   REFERRING PHYSICIAN: Lamar Porter MD  RETURN PHYSICIAN APPOINTMENT: 2 weeks       ASSESSMENT:  Ms. Patience Flor  is post R TKA on . Pt seen 12 visits. Pt presented with decreased ROM and strength. Pt with improved gait without assistive device. Less pain with use of gabapentin and soft tissue work. Less sensitivity at R LE. Hunter Gipson .Slow but steady increase in ROM. Good ext. Pain is limiting factor but is steadily decreasing. Pt fell against injured shoulder and now with increased R shoulder pain. She is to have surgery in March. Working to increase ROM to normalize gait and increase confidence in gait. Working to increase ROM and strength to enable return to prior activity level. Instructed patient to continue home exercises and stretches as directed. presented with decreased ROM and strength post R TKA on 18. Pt progressed to gait without assistive device. Will need to continue with HEP to increase strength and increase activity level. Continue with desensitize exercises . Will discontinue PT at this time. 1.        GOALS: (Goals have been discussed and agreed upon with patient.)  Short-Term Functional Goals: Time Frame: 30 days   1.  Establish independent HEP with no cueing to increase ROM and strength MET  2. Increase R knee ROM to 0-115 to increase ease of sitting and ambulation  MET  3. Increase LE strength to enable initiation of reciprocal pattern on stairs   MET  4. Able to ambulate 10-15 min with minimal to no increase in R knee pain . MET 10 MIN . Discharge Goals: Time Frame: 90 days  1. Improve score on LEFS by 9 points to enable prolonged sitting, standing and ambulation MET  2. Increase R knee ROM to 0-125 to normalize gait MET  3. Increase R LE strength 1/2 to 1 grade to enable reciprocal pattern on stairs MET WITH RAILING  4. Able to ambulate 20-30 min with minimal to no increase in R knee pain  NOT MET    NEW GOAL  1.  Decrease sensitivity of R knee to decrease pain   MET  Rehabilitation Potential For Stated Goals: Umer Tom, PT

## 2019-03-11 NOTE — ANESTHESIA PREPROCEDURE EVALUATION
Anesthetic History   No history of anesthetic complications            Review of Systems / Medical History  Patient summary reviewed and pertinent labs reviewed    Pulmonary  Within defined limits                 Neuro/Psych         Headaches and psychiatric history (Anxiety/Depression)     Cardiovascular    Hypertension: well controlled              Exercise tolerance: >4 METS  Comments: [facility] 2016  Denies CP, SOB or changes in functional status   GI/Hepatic/Renal  Within defined limits              Endo/Other        Arthritis     Other Findings   Comments: DVT s/p joint replacement on Xarelto now held for procedure         Physical Exam    Airway  Mallampati: II  TM Distance: 4 - 6 cm  Neck ROM: normal range of motion   Mouth opening: Normal     Cardiovascular    Rhythm: regular  Rate: normal         Dental  No notable dental hx       Pulmonary  Breath sounds clear to auscultation               Abdominal  GI exam deferred       Other Findings            Anesthetic Plan    ASA: 2  Anesthesia type: general      Post-op pain plan if not by surgeon: peripheral nerve block single      Anesthetic plan and risks discussed with: Patient

## 2019-03-11 NOTE — OP NOTES
Operative Note    3/11/2019     Preoperative diagnosis:  Rotator cuff tear arthropathy of right shoulder [M75.101, M12.811]  Bicipital tendinitis, right [M75.21], AC Arthritis    Postoperative diagnosis: Rotator cuff tear arthropathy of right shoulder [M75.101, M12.811]  Bicipital tendinitis, right [M75.21], AC Arthritis    Surgeon(s) and Role:     Nida Faulkner MD - Primary     Assistant: none     Anesthesia: General, regional block    Antibiotics: CLeocin IV    Procedures:  Procedure(s):  RIGHT SHOULDER ARTHROSCOPY WITH OPEN ROTATOR CUFF REPAIR OF SUPRASPINATUS, INFRASPINATUS AND SUBSCAPULARIS BICEPS OPEN TENODESIS/SUBACROMIAL DECOMPRESSION   (LARGE)  96175  SAD 65552  Subpectoral biceps tenodesis 25568      Findings:  1. EUA -  Slight adhesions with flexion. 2. Intra-articular - Large tear of subscapularis and supraspinatus including anterior infraspinatus. The biceps had a high grade tear present. The cartilage appeared fairly normal. The labrum was not torn. 3. Subacromial - RTC with large tear as above. The subscapularis tissue was good the supra and infra tissue was fair. There was a large subacromial and coracoid spur. 4. AC joint - not symptomatic preoperatively. Indications / Consent: This is a patient who has persistent pain with some weakness in the shoulder. They have not responded to conservative measures and were desirous of evaluation and possible arthroscopic or open repair of the rotator cuff. After previous discussions and treatments using both conservative and/or non-operative treatment options the patient elected to proceed with surgery due to continued symptoms. A review of the risks and benefits, including but not limited to infection, stiffness, injury to nerves and vessels, DVT, PE, MI, need for further operations and other anesthesia related risks was performed with the patient.  After this review and the review of the likely outcome and potential complications of the procedure, preoperative verbal and written consents were obtained. The operative procedure and postoperative course were discussed with the patient in detail and the extremity was marked by the patient and myself. Procedure: the patient was given an anesthetic, placed semi sitting, the head was held in a neutral position, the legs were flexed and pillows were placed under the legs. They were then padded and the operative shoulder was prepped with ChloraPrep and draped in the usual fashion. An EUA was performed and noted above. Prior to the beginning of the procedure, a time-out was performed for correct surgical site identification as was marked during the pre-operative meeting. This was confirmed using the written consent and history/physical. Time-out for antibiotic dosing, timing and selection was also performed. 10 cc of 5% lidocaine with epi was injected into the subacromial space. The operative arm was connected to an arm pickard and a standard posterior portal was made into the shoulder. On visualization of the shoulder after an anterior portal was developed, the biceps appeared subluxed and enlarged. The subscapularis appeared torn from the lesser tuberosity but was not retracted due to the attached tissue to the supraspinatus. The anterior labrum was intact. The axillary recess was normal.  The posterior labrum was intact. The articular surface appeared fairly normal.  The scope was switched for a superior view and a full thickness tear of the rotator cuff was visualized of the supraspinatus too. It was felt that the biceps appeared to be a likely source of pain and was subluxating with palpation using the probe. Using cautery and sharp instrumentation the biceps was tenotomized and the anchor debrided. Attention was then turned to the subpectoral approach for the tenodesis. A small 4 cm incision near the axilla was performed with a scalpel.  Using blunt and sharp dissection the bicipital groove and inferior border of the pectoralis major was identified. Carefully, retractors were placed laterally and medially to protect the neurovascular structures. The bicipital sheath was opened and the proximal biceps identified. It was subluxed medially. The cortex was roughened with a curet. A drill was then used to make a hole for the biceps anchor device in the biceps groove proximal to the inferior aspect of the pectoralis tendon. The anchor had two sutures loaded with needles. The biceps tendon was then sewed with locking stitches. The tendon was then secured to the bone well. Irrigation was performed. The elbow was taken through full extension and flexion and the fixation was secure. Irrigation was repeated. The incision was then closed in layered fashion and skin glue was applied. At this point attention was redirected to the subscapularis. An anterolateral portal was developed and through this instruments were placed for debridement of the footprint and lightly decorticating this area. Through the more standard anterior portal an anchor was directed into the lower and upper aspect of the footprint and this was screwed in to the appropriate depth. The sutures were then pulled to an auxiliary cannula and suture passing instrument such as the bird beak and spectrum were then used to pass the sutures from the anchor through different locations in the upper subscapularis. After both sutures had been placed they were then  and individually tied down. The sutures were cut, the upper subscap was then probed, and it was felt that the subscap repair was stable. The scope was switched for a superior view and a full thickness tear of the rotator cuff was visualized. At this point the scope was pulled out of the posterior portal and placed subacromially. A lateral portal was developed. The size of the tear appeared to be approximately 3 cm.   There was fraying on the coracoacromial ligament. At this point an electrocautery device was used to debride or shrink the coracoacromial ligament and expose the inferior aspect of the acromion. A 5 mm megan was then used to remove anterior spurring from the acromion and the portals were switched so the acromion appeared flat. The portals were then switched back and the nature of the cuff tear was determined. The edge of the cuff was debrided and mobilized. The greater tuberosity was lightly debrided with a shaver to prepare for tendon reattachment. After this had been done an auxiliary superior portal was developed and a bone punch was placed into the greater tuberosity in an appropriate position and tapped down into the bone. An anchor was placed just off the articular margin which was double loaded. This was repeated so four anchors were placed. Using suture passing devices these sutures were passed through the cuff in horizontal mattress fashion. The lateral greater tuberosity was cleaned for lateral anchor placement. Half of the sutures from the medial anchors were pulled to the anterior lateral anchor placement. A hole was punched and the sutures placed on the anchor and the anchor advanced to secure the sutures. This process was repeated with the remaining sutures pulled to a more posterior lateral anchor in a similar fashion. This created a transosseous equivalent repair. After the sutures had been cut the cuff was probed and the shoulder was rotated to check for stability of the repair. It was felt that an adequate, tight repair had been able to be obtained. The scope was then removed from the shoulder. The cannulas were removed. Interrupted monofilament sutures were placed on the portals. Allevyn dressings were placed in the shoulder. The patient was placed in a slight external rotation sling. They were returned to the recovery room in satisfactory condition. There were no known intraoperative complications. Post-operative plan: The patient was placed in an abduction sling and will remain in this for 4-6 weeks. Non weight bearing until otherwise instructed. Post operative instructions are provided. They will begin with instructed ROM exercises and PT (LARGE SUBSCAP protocol) once scheduled through my office. I will see them back in approximately 10-12 days. Estimated Blood Loss:   10 ml    Fluids:  See anesthesia record    Implants:   Implant Name Type Inv.  Item Serial No.  Lot No. LRB No. Used Action   ANCHOR SUT 2.3MM W/2.0MM BRAID --  - [id number]  ANCHOR SUT 2.3MM W/2.0MM BRAID --   [employer] [id number] Right 1 Implanted   ANCHOR SUT PEEK THRD 7.2UZ -- HEALICOIL - [id number]  ANCHOR SUT PEEK THRD 4.2MZ -- HEALICOIL  [employer] [id number] Right 1 Implanted   ANCHOR SUT PEEK SORB 6.7GA -- HEALICOIL - [id number]  ANCHOR SUT PEEK SORB 1.2TE -- HEALICOIL  [employer] [id number] Right 1 Implanted   ANCHOR SUT ULTRA PEEK KL 5.5MM -- MULTIFIX S - [id number]  ANCHOR SUT ULTRA PEEK KL 5.5MM -- Kumar Dues [employer] [id number] Right 1 Implanted   ANCHOR SUT ULTRA PEEK KL 5.5MM -- Lenell Spitz [id number]  ANCHOR SUT ULTRA PEEK KL 5.5MM -- Kumar Dues [employer] [id number] Right 2 Implanted       Closure: Primary    Complications: None    Signed By: Mattie Oneal MD

## 2019-03-11 NOTE — H&P
Outpatient Surgery History and Physical:  Alexia Schmitt was seen and examined. CHIEF COMPLAINT:   Right shoulder pain. PE:     Visit Vitals  /58 (BP 1 Location: Left arm, BP Patient Position: At rest)   Pulse (!) 56   Temp 98.3 °F (36.8 °C)   Resp 16   Wt 68 kg (150 lb)   SpO2 97%   BMI 26.57 kg/m²       Heart:   Regular rhythm      Lungs:  Are clear      Past Medical History:    Patient Active Problem List    Diagnosis    Osteoarthritis of right knee    Status post right knee replacement    Snoring       Surgical History:   Past Surgical History:   Procedure Laterality Date    CARDIAC SURG PROCEDURE UNLIST  2014     cardiac cath    HX APPENDECTOMY      HX DILATION AND CURETTAGE      HX ORTHOPAEDIC Right 09/2018    knee replaced       Social History: Patient  reports that  has never smoked. she has never used smokeless tobacco. She reports that she drinks alcohol. She reports that she does not use drugs. Family History:   Family History   Problem Relation Age of Onset    Heart Disease Father     Diabetes Father     Lung Disease Father        Allergies: Reviewed per EMR  Allergies   Allergen Reactions    Pcn [Penicillins] Angioedema       Medications:    No current facility-administered medications on file prior to encounter. Current Outpatient Medications on File Prior to Encounter   Medication Sig    losartan (COZAAR) 50 mg tablet Take  by mouth daily.  FLUoxetine (PROZAC) 10 mg capsule Take 20 mg by mouth daily.  gabapentin (NEURONTIN) 300 mg capsule Take 300 mg by mouth three (3) times daily. 300 mg in am and with lunch-- 600 mg at hs    cetirizine (ZYRTEC) 10 mg tablet Take  by mouth nightly.  rOPINIRole (REQUIP) 1 mg tablet Take 1 mg by mouth nightly. Indications: Restless Legs Syndrome    fluticasone (FLONASE) 50 mcg/actuation nasal spray 2 Sprays by Both Nostrils route as needed for Rhinitis.     rivaroxaban (XARELTO) 20 mg tab tablet Take 1 Tab by mouth daily (with breakfast). (Patient taking differently: Take 20 mg by mouth daily (with breakfast). Stopped 3/5/19 oked per dr Gentry Malin-- / dr Iggy Padilla---)       The surgery is planned for the right shoulder, rotator cuff repair and open vs. Arthroscopic biceps tenodesis. History and physical has been reviewed. The patient has been examined. There have been no significant clinical changes since the completion of the originally dated History and Physical.  Patient identified by surgeon; surgical site was confirmed by patient and surgeon. The patient is here today for outpatient surgery. I have examined the patient, no changes are noted in the patient's medical status. Necessity for the procedure/care is still present and the history and physical above is current. See the office notes for the full long term history of the problem. Please see the recent office notes for the musculoskeletal examination.     Signed By: Luis dEuardo Mckeon MD     March 11, 2019 10:17 AM

## 2019-03-11 NOTE — BRIEF OP NOTE
BRIEF OPERATIVE NOTE    Date of Procedure: 3/11/2019   Preoperative Diagnosis: Rotator cuff tear arthropathy of right shoulder [M75.101, M12.811]  Bicipital tendinitis, right [M75.21], AC Arthritis  Postoperative Diagnosis: Rotator cuff tear arthropathy of right shoulder [M75.101, M12.811]  Bicipital tendinitis, right [M75.21], AC Arthritis    Procedure(s):  RIGHT SHOULDER ARTHROSCOPY WITH OPEN ROTATOR CUFF REPAIR/ BICEPS TENODESIS/SUBACROMIAL DECOMPRESSION  Surgeon(s) and Role:     * Hemal Langley MD - Primary         Surgical Assistant: none    Surgical Staff:  Circ-1: Aaron Auguste RN  Circ-Relief: Tommie Nunez RN; Reshma Fernandez Tech-1: Trena Sandoval  Scrub Tech-2: Lee Jo  Scrub Tech-Relief: Maple Bee  Event Time In Time Out   Incision Start 1059    Incision Close 1237      Anesthesia: General   Estimated Blood Loss: 10 ml  Specimens: * No specimens in log *   Findings: Large RTC tear, biceps tenodesis, impingement. Complications: none  Implants:   Implant Name Type Inv.  Item Serial No.  Lot No. LRB No. Used Action   ANCHOR SUT 2.3MM W/2.0MM BRAID --  - [id number]  ANCHOR SUT 2.3MM W/2.0MM BRAID --   [employer] [id number] Right 1 Implanted   ANCHOR SUT PEEK THRD 2.8SF -- HEALICOIL - [id number]  ANCHOR SUT PEEK THRD 6.2SV -- HEALICOIL  [employer] [id number] Right 1 Implanted   ANCHOR SUT PEEK SORB 9.3EN -- HEALICOIL - [id number]  ANCHOR SUT PEEK SORB 4.6UT -- HEALICOIL  [employer] [id number] Right 1 Implanted   ANCHOR SUT ULTRA PEEK KL 5.5MM -- MULTIFIX S - [id number]  ANCHOR SUT ULTRA PEEK KL 5.5MM -- Shirlie Purpura [employer] [id number] Right 1 Implanted   ANCHOR SUT ULTRA PEEK KL 5.5MM -- Monae [place] - [id number]  ANCHOR SUT ULTRA PEEK KL 5.5MM -- Shirlie Purpura [employer] [id number] Right 2 Implanted

## 2019-03-11 NOTE — ANESTHESIA POSTPROCEDURE EVALUATION
Procedure(s):  RIGHT SHOULDER ARTHROSCOPY WITH OPEN ROTATOR CUFF REPAIR/ BICEPS TENODESIS/SUBACROMIAL DECOMPRESSION.     Anesthesia Post Evaluation      Multimodal analgesia: multimodal analgesia used between 6 hours prior to anesthesia start to PACU discharge  Patient location during evaluation: PACU  Patient participation: complete - patient participated  Level of consciousness: awake  Pain management: adequate  Airway patency: patent  Anesthetic complications: no  Cardiovascular status: acceptable  Respiratory status: acceptable  Hydration status: acceptable  Post anesthesia nausea and vomiting:  none      Visit Vitals  /78   Pulse 77   Temp 36.5 °C (97.7 °F)   Resp 16   Wt 68 kg (150 lb)   SpO2 96%   BMI 26.57 kg/m²

## 2019-03-11 NOTE — DISCHARGE INSTRUCTIONS
Rotator Cuff Repair Postoperative Instructions    Returning Home  1. Your pain after surgery will vary depending on the method of anesthesia used and from patient to patient. In the first 24 hours, pain medication should be taken regularly with small amounts of food. During this time, nausea and light-headedness are common and should improve in 2-5 days. Drinking fluids may help. If nausea persists, medicine can be prescribed by calling your doctor at [phone]. Leaving the Outpatient Surgery Center:  As you leave the surgery center, you will be in a sling and swath. The sling will have a pillow with it holding your arm away from your body slightly. Plan on wearing the sling for 4-6 weeks after surgery. Make sure that you have a large shirt or a button up shirt to wear home. For the first week:  1. Sleeping and resting will be more comfortable if you are propped up in bed or have access to a recliner. 2. Ice your shoulder to help manage the pain. 20 minutes of ice every hour as needed. 3. You may come out of the sling 3-5x/day to do elbow, wrist and hand range of motion, and other home exercises (listed further down in - Home Excersizes) so your other joints do not get stiff. Just do not activate or use your shoulder muscles! 4. Sleep with your sling on. Sling Use  You will be in the sling for 4 to 6 weeks. You may take the sling off to do your home exercises or physical therapy, or shower, but you need to wear the sling at all other times, even SLEEPING. To put the sling on:    1. Make sure the pillow of the sling is snug against your side and that your hand and elbow are parallel to the floor. 2. One strap will go around your waist and connect to the pillow. 3. The other strap is one up front and two in back. The part that says DonJoy will go by the neck and the other padded part will go under your arm of the non-operated side. Care of Your Incisions  1.  Incisions and stitches are often checked/removed 6 to 10 days after surgery. 2. Moderate bleeding may occur at the incision sites. This should decrease quickly over time. 3. Leave the dressings from surgery in place for 48 to 72 hours. The bulky dressings may be removed and replaced with fresh gauze at that time, but leave on the small tape strips on the incision sites. 4. Watch the wound for increasing redness, tenderness, swelling, and pus drainage daily. These can be early signs of infection. If you notice any of these signs of infection please call [phone]. A mild fever during the first few days after surgery is not uncommon. This often occurs and can be treated with deep breathing, coughing to clear the lungs, and walking. However, fevers, increasing pain, and swelling at the incisions should be reported immediately. Showering:   Until your sutures are removed, you should consider covering your shoulder in saran wrap with tape for showering.  A plastic chair in your shower will allow you to sit.  Sponge bathing is also an option.  In general, after your sutures have been removed you may allow your incisions to get wet in the shower. Post-Operative Pain Management  ANESTHESIA: You will meet with an anesthesiologist on the day of surgery to discuss your anesthesia. You will have general anesthesia and often will have a nerve block. MEDICATIONS: You will be given a prescription for medications. Please take them as directed on the label and with food.  Certain pain medications may contain Tylenol(Acetaminophen). It is important not to take any additional Tylenol while on these pain medications.  Do not mix your pain medications with alcohol.  You should not drive while taking pain medications as they increase your liability and delay your responses.  Your physician will most likely talk with you about taking Aspirin 81 mg or 325 mg (ECASA) once daily for two-three weeks after surgery.  This is done in order to help minimize the risk for a blood clot from developing, which is a possible complication after any surgery. If you have Ulcers or stomach irritation do not take this medicine. Be careful taking aspirin and other NSAID's as it can increase your risk of gastric irritation and other side effects.  If you have any questions or concerns regarding your medications, please call the office. · Common side effects of the narcotics include nausea, vomiting, drowsiness, constipation, and difficulty urinating. If you experience constipation, drink lots of water/Gatorade, avoid soda and diet drinks. Eat plenty of fiber. You may take a stool softener: Colace 100mg twice a day for the first week. For severe constipation use magnesium citrate, one 8 oz bottle. All can be bought at the pharmacy. Diet and General Conditioning  Aerobic conditioning and diet are both very important after surgery. In general, we recommend that you make sure to avoid skipping meals, eat a balanced diet including regular portions of fruits and vegetables, and avoid relying on fast foods while you are recovering from surgery. Also, consider taking a daily multi-vitamin. Participate in some form of aerobic conditioning after surgery. Speak with your physical therapist or call our office to determine an appropriate form of exercise after surgery. Initially, your exercise will need to be modified after surgery. Follow Up Visits  Doctor  Plan on seeing your surgeon at 1 week, 1 month, 3 months, and 6 months after surgery. If your shoulder does not progress as planned, you are welcome to schedule additional visits. There is usually no charge for surgery related visits 90 days following surgery. You will receive a bill for any x-rays or special equipment (such as a brace). Home Exercises  Do these exercises 3-5 times per day after surgery    1. Elbow, Wrist, and Hand Motion- have someone help you remove your sling.  Let your arm dangle at your side. Using your other hand to help, bend and straighten your elbow. Also, turn your hand back and forth and move your fingers, wrist, and hand. Do this for 3-5 minutes each time that you do it. 2. Pendulums- Have someone help you remove your sling. Let your arm dangle straight at your side. Brace yourself with your non-operated arm on a table or chair. Lean forward slightly and relax your shoulder. Gravity will pull your arm away from your body. Let your arm hang and when you feel comfortable use your body to slightly swing your arm. Slowly stand up. Do this 3 times for up to 1 minute each time. DO NOT ACTIVATE YOUR SHOULDER MUSCLES. 3. Scapular Squeezes- You may leave your sling on for this exercise. Sit up straight. Roll your shoulders back and squeeze your shoulder blades together. Do this 30 times for each set of exercises that you do. 4. Scapular Shrugs- You may leave your sling on for this exercise as well. Sit up straight and do a scapular squeeze, while you hold the squeeze, shrug your shoulders. Do this 15 times for each set of exercises that you do throughout the day.     [address] [facility]                 Phone [phone]  [facility]                 Fax [phone]                             Akosua Gomez

## 2019-04-03 PROBLEM — G47.33 OSA (OBSTRUCTIVE SLEEP APNEA): Status: ACTIVE | Noted: 2018-09-13

## 2019-04-03 PROBLEM — G47.34 NOCTURNAL HYPOXEMIA: Status: ACTIVE | Noted: 2019-04-03

## 2019-04-03 PROBLEM — G47.63 BRUXISM, SLEEP-RELATED: Status: ACTIVE | Noted: 2019-04-03

## 2019-04-03 PROBLEM — G25.81 RLS (RESTLESS LEGS SYNDROME): Status: ACTIVE | Noted: 2019-04-03

## 2020-03-11 ENCOUNTER — HOSPITAL ENCOUNTER (OUTPATIENT)
Dept: MAMMOGRAPHY | Age: 70
Discharge: HOME | End: 2020-03-11
Attending: INTERNAL MEDICINE
Payer: MEDICARE

## 2020-03-11 DIAGNOSIS — M81.0 AGE-RELATED OSTEOPOROSIS WITHOUT CURRENT PATHOLOGICAL FRACTURE: ICD-10-CM

## 2020-03-11 DIAGNOSIS — Z12.31 ENCOUNTER FOR SCREENING MAMMOGRAM FOR MALIGNANT NEOPLASM OF BREAST: ICD-10-CM

## 2020-03-11 PROCEDURE — 77063 BREAST TOMOSYNTHESIS BI: CPT

## 2020-03-11 PROCEDURE — 77080 DXA BONE DENSITY AXIAL: CPT

## 2020-12-21 ENCOUNTER — TRANSCRIBE ORDER (OUTPATIENT)
Dept: SCHEDULING | Age: 70
End: 2020-12-21

## 2020-12-21 DIAGNOSIS — Z12.31 SCREENING MAMMOGRAM FOR HIGH-RISK PATIENT: Primary | ICD-10-CM

## 2021-04-05 ENCOUNTER — HOSPITAL ENCOUNTER (OUTPATIENT)
Dept: MAMMOGRAPHY | Age: 71
Discharge: HOME | End: 2021-04-05
Attending: INTERNAL MEDICINE
Payer: MEDICARE

## 2021-04-05 DIAGNOSIS — Z12.31 SCREENING MAMMOGRAM FOR HIGH-RISK PATIENT: ICD-10-CM

## 2021-04-05 PROCEDURE — 77063 BREAST TOMOSYNTHESIS BI: CPT

## 2022-01-21 ENCOUNTER — TRANSCRIBE ORDER (OUTPATIENT)
Dept: SCHEDULING | Age: 72
End: 2022-01-21

## 2022-01-21 DIAGNOSIS — Z12.31 ENCOUNTER FOR SCREENING MAMMOGRAM FOR MALIGNANT NEOPLASM OF BREAST: Primary | ICD-10-CM

## 2022-03-18 PROBLEM — M17.11 OSTEOARTHRITIS OF RIGHT KNEE: Status: ACTIVE | Noted: 2018-09-24

## 2022-03-19 PROBLEM — G47.33 OSA (OBSTRUCTIVE SLEEP APNEA): Status: ACTIVE | Noted: 2018-09-13

## 2022-03-19 PROBLEM — Z96.651 STATUS POST RIGHT KNEE REPLACEMENT: Status: ACTIVE | Noted: 2018-09-24

## 2022-03-19 PROBLEM — G47.63 BRUXISM, SLEEP-RELATED: Status: ACTIVE | Noted: 2019-04-03

## 2022-03-19 PROBLEM — G25.81 RLS (RESTLESS LEGS SYNDROME): Status: ACTIVE | Noted: 2019-04-03

## 2022-03-20 PROBLEM — G47.34 NOCTURNAL HYPOXEMIA: Status: ACTIVE | Noted: 2019-04-03

## 2022-04-06 ENCOUNTER — HOSPITAL ENCOUNTER (OUTPATIENT)
Dept: MAMMOGRAPHY | Age: 72
Discharge: HOME | End: 2022-04-06
Attending: INTERNAL MEDICINE
Payer: MEDICARE

## 2022-04-06 DIAGNOSIS — Z12.31 ENCOUNTER FOR SCREENING MAMMOGRAM FOR MALIGNANT NEOPLASM OF BREAST: ICD-10-CM

## 2022-04-06 PROCEDURE — 77063 BREAST TOMOSYNTHESIS BI: CPT

## 2022-09-28 NOTE — PROGRESS NOTES
MEDICARE WELLNESS VISIT + NOTE    CHIEF COMPLAINT:  Amy Javier presents for her Subsequent Annual Medicare Wellness Visit.   Her additional complaints or concerns are addressed below.     Patient Care Team:  Chester Ambrosio DO as PCP - General (Family Practice)        Patient Active Problem List   Diagnosis   • Localized osteoarthrosis not specified whether primary or secondary, lower leg   • Chondrocalcinosis, cause unspecified, involving other specified sites(712.38)   • Esophageal reflux   • Hyperlipidemia   • Increased immunoglobulin   • Vitamin D deficiency   • Skin tag   • Cervical arthritis   • AC (acromioclavicular) arthritis   • Shoulder pain   • Neck pain   • COPD (chronic obstructive pulmonary disease) (CMS/[facility])   • Diarrhea   • Trochanteric bursitis or tendonitis   • Chest pain   • Myalgia   • Arthritis   • Fatigue   • Left foot pain   • Foot pain   • Primary osteoarthritis of right knee   • Chronic autoimmune thyroiditis   • Hyperthyroidism   • Tear of right rotator cuff   • Right hip pain   • Acute CVA (cerebrovascular accident) (CMS/[facility])   • S/P cardiac catheterization   • Rotator cuff syndrome of right shoulder   • Arthritis of right knee   • Encounter for therapeutic drug monitoring [Z51.81]   • Long term (current) use of anticoagulants [Z79.01]   • Loss of smell   • Epiretinal membrane, right eye   • Dry eye syndrome of both eyes   • Pseudophakia, both eyes   • Squamous cell carcinoma left lower leg s/p excision 9/24/2019 (Dr. Remy)   • Acute right-sided low back pain without sciatica   • Myopia with presbyopia of both eyes   • Posterior capsular opacification of both eyes, not obscuring vision   • Atherosclerotic heart disease of native coronary artery without angina pectoris   • Polymyalgia rheumatica (CMS/[facility])   • Mixed hyperlipidemia   • History of CVA (cerebrovascular accident) without residual deficits   • Statin intolerance         Past Medical History:   Diagnosis Date   • Acute CVA  Problem: Mobility Impaired (Adult and Pediatric)  Goal: *Acute Goals and Plan of Care (Insert Text)  GOALS (1-4 days):  (1.)Ms. Lucio Hyman will move from supine to sit and sit to supine  in bed with STAND BY ASSIST.  (2.)Ms. Lucio Hyman will transfer from bed to chair and chair to bed with STAND BY ASSIST using the least restrictive device. goal met  (3.)Ms. Lucio Hyman will ambulate with STAND BY ASSIST for 150 feet with the least restrictive device. (4.)Ms. Lucio Hyman will ambulate up/down 2 steps with left railing with MINIMAL ASSIST with device as needed. (5.)Ms. Lucio yHman will increase right knee ROM to 5°-80°.  ________________________________________________________________________________________________      PHYSICAL THERAPY [facility] camp tKa: Daily Note, AM 9/26/2018  INPATIENT: Hospital Day: 3  [place] / [place] / Product Type: Medicare /      NAME/AGE/GENDER: Sarah Garcia is a 76 y.o. female   PRIMARY DIAGNOSIS:  Primary osteoarthritis of right knee [M17.11]   Procedure(s) and Anesthesia Type:     * KNEE ARTHROPLASTY TOTAL/ RIGHT/  - Spinal (Right)  ICD-10: Treatment Diagnosis:    · Pain in Right Knee (M25.561)  · Stiffness of Right Knee, Not elsewhere classified (M25.661)  · Difficulty in walking, Not elsewhere classified (R26.2)      ASSESSMENT:     Ms. Lucio Hyman presents with decreased rom and strength of right LE as well as decreased functional mobility and gait s/p right tka. She plans to go home with HHPT. Pt. Reports she is doing well with not much pain. She ambulated well with walker and did stairs without difficulty. She did tka exercises with cues. No questions or concerns about going home today from patient or . This section established at most recent assessment   PROBLEM LIST (Impairments causing functional limitations):  1. Decreased Strength  2. Decreased ADL/Functional Activities  3. Decreased Transfer Abilities  4.  Decreased (cerebrovascular accident) (CMS/HCC) 01/30/2018    also 3/2018   • Anxiety    • Bronchitis    • Cataract    • Chronic obstructive asthma with status asthmaticus (CMS/[facility])    • Chronic pain    • Combined forms of age-related cataract of both eyes 7/6/2018   • COPD (chronic obstructive pulmonary disease) (CMS/[facility]) 4/13/2012   • Disorder of bone and cartilage, unspecified 04/05/2006   • Esophageal reflux 5/19/2011   • Essential (primary) hypertension    • Extrinsic asthma with status asthmaticus    • Herpes zoster without mention of complication     shingles   • Hyperlipidemia 5/19/2011   • Increased immunoglobulin 5/19/2011   • Malignant neoplasm (CMS/[facility]) 2018    sqamous cell cancer in the left leg removed  around skin    • Myalgia and myositis, unspecified    • Myocardial infarction (CMS/HCC) 03/2018   • Pneumonia    • Regional enteritis of small intestine with large intestine (CMS/[facility])    • S/P cardiac catheterization 3/21/2018    Cardiac catheterization 3/9/2018 with Dr. Henderson Left main: Normal Left circumflex artery: Normal Left anterior descending artery: Normal Subtotal occlusion in the lower branch of the second diagonal  Right coronary artery: Dominant mild mid and distal lesions LVEF and wall motion normal  Recommendations: Medical management, risk factor modification Second diagonal branch with subtotal occlusion is small caliber vessel for intervention.   • Sleep apnea    • Thyroid condition    • Traumatic hemorrhage of right cerebrum without loss of consciousness (CMS/HCC) 5/28/2016   • Urinary incontinence    • Urinary tract infection    • Vitamin D deficiency 5/19/2011         Past Surgical History:   Procedure Laterality Date   • Appendectomy  1954   • Arthrotomy/explore/treat knee joint Right 1981    Knee Open Surgery   • Biopsy of breast, incisional Left 1979    Breast Biopsy   • Bone marrow harvest  1990    Bone marrow donor   • Breast reduction Bilateral 1989    Breast Reduction   • Cardiac  Ambulation Ability/Technique  5. Decreased Balance  6. Increased Pain  7. Decreased Activity Tolerance  8. Decreased Flexibility/Joint Mobility  9. Decreased [place] with Home Exercise Program   INTERVENTIONS PLANNED: (Benefits and precautions of physical therapy have been discussed with the patient.)  1. Bed Mobility  2. Gait Training  3. Home Exercise Program (HEP)  4. Therapeutic Exercise/Strengthening  5. Transfer Training  6. Range of Motion: active/assisted/passive  7. Therapeutic Activities  8. Group Therapy     TREATMENT PLAN: Frequency/Duration: Follow patient BID for duration of hospital stay to address above goals. Rehabilitation Potential For Stated Goals: Good     RECOMMENDED REHABILITATION/EQUIPMENT: (at time of discharge pending progress): Continue Skilled Therapy and Home Health: Physical Therapy. HISTORY:   History of Present Injury/Illness (Reason for Referral):  S/p right tka  Past Medical History/Comorbidities:   Ms. Chase Rodriguez  has a past medical history of Arthritis; Chronic pain; Environmental allergies; Hypertension; Ill-defined condition; Migraine; Psychiatric disorder; RLS (restless legs syndrome); and Rosacea. She also has no past medical history of Adverse effect of anesthesia; Difficult intubation; Malignant hyperthermia due to anesthesia; Nausea & vomiting; or Pseudocholinesterase deficiency. Ms. Chase Rodirguez  has a past surgical history that includes pr abdomen surgery proc unlisted; hx appendectomy; and hx dilation and curettage.   Social History/Living Environment:   Home Environment: Private residence  # Steps to Enter: 1  One/Two Story Residence: Two story, live on 1st floor  Living Alone: No  Support Systems: Family member(s)  Patient Expects to be Discharged to[de-identified] Private residence  Current DME Used/Available at Home: None  Tub or Shower Type: Shower  Prior Level of Function/Work/Activity:  independnet   Number of Personal Factors/Comorbidities that affect the Plan of Care: 1-2: MODERATE COMPLEXITY   EXAMINATION:   Most Recent Physical Functioning:               RLE AROM  R Knee Flexion: 78  R Knee Extension: 0       RLE Strength  R Knee Flexion: 3  R Knee Extension: 3    Bed Mobility  Supine to Sit: Stand-by assistance    Transfers  Sit to Stand: Stand-by assistance  Stand to Sit: Stand-by assistance  Bed to Chair: Stand-by assistance    Balance  Sitting: Intact  Standing: With support              Weight Bearing Status  Right Side Weight Bearing: As tolerated  Distance (ft): 100 Feet (ft) (x 2)  Ambulation - Level of Assistance: Stand-by assistance  Assistive Device: Walker, rolling  Speed/Tara: Delayed  Stance: Right decreased  Gait Abnormalities: Antalgic  Number of Stairs Trained: 3 (also one step with walker )  Stairs - Level of Assistance: Contact guard assistance;Minimum assistance  Rail Use: Both  Interventions: Safety awareness training;Verbal cues     Braces/Orthotics:     Right Knee Cold  Type: Cryocuff      Body Structures Involved:  1. Bones  2. Joints  3. Muscles  4. Ligaments Body Functions Affected:  1. Movement Related Activities and Participation Affected:  1. Mobility   Number of elements that affect the Plan of Care: 3: MODERATE COMPLEXITY   CLINICAL PRESENTATION:   Presentation: Stable and uncomplicated: LOW COMPLEXITY   CLINICAL DECISION MAKING:   [employer] AM-PAC 6 Clicks   Basic Mobility Inpatient Short Form  How much difficulty does the patient currently have. .. Unable A Lot A Little None   1. Turning over in bed (including adjusting bedclothes, sheets and blankets)? [] 1   [] 2   [x] 3   [] 4   2. Sitting down on and standing up from a chair with arms ( e.g., wheelchair, bedside commode, etc.)   [] 1   [] 2   [x] 3   [] 4   3. Moving from lying on back to sitting on the side of the bed? [] 1   [] 2   [x] 3   [] 4   How much help from another person does the patient currently need. .. Total A Lot A Little None   4.   Moving to catherization  2018    cornary angiogram   • Cardiac catherization  2018    cv-shae   • Cataract extraction w/  intraocular lens implant Left 2018    Aly Willis MD   • Cataract extraction w/ intraocular lens implant Right 2018    Aly Willis MD   • Colonoscopy diagnostic  2020    Dr Mandujano  Extensive diverticulosis mostly in the left colon   • Cosmetic abdominoplasty tummy tuck      Tummy tuck   • D and c  1984    D&C   • Dexa bone density axial skeleton  2007   • Esophagogastroduodenoscopy transoral flex diag  8/15/15    Dysphagia   • Exc skin malig 3.1-4cm trunk,arm,leg Left 2019    Dr. Remy - squamous cell cancer   • Hysterectomy  2018    laparoscopy with total hysterectomy with dr. schroeder and bladder sling   • Joint replacement      left knee   • Knee scope,diagnostic      Left knee arthroscopy   • Laparoscopic fundoplasty      Nissen Fundoplication, laparoscopic,  and    • Obtaining screen pap smear  2009   • Parathyroidectomy      removal of parathyroid   • Remove tonsils/adenoids,<11 y/o  1969    Tonsils only         Social History     Tobacco Use   • Smoking status: Former Smoker     Packs/day: 1.00     Years: 40.00     Pack years: 40.00     Types: Cigarettes     Quit date:      Years since quittin.8   • Smokeless tobacco: Never Used   • Tobacco comment: smoking hx per chart audit smoked 1 ppd   Vaping Use   • Vaping Use: never used   Substance Use Topics   • Alcohol use: Yes     Alcohol/week: 1.0 standard drink     Types: 1 Glasses of wine per week     Comment: rarely twice a year have wine.   • Drug use: No     Family History - reviewed      Current Outpatient Medications   Medication Sig Dispense Refill   • predniSONE (DELTASONE) 5 MG tablet Take 1 tablet by mouth daily. Take with food. Indications: Polymyalgia Rheumatica 90 tablet 1   • tiotropium (Spiriva HandiHaler) 18 MCG capsule for inhaler Place 1 capsule  and from a bed to a chair (including a wheelchair)? [] 1   [] 2   [x] 3   [] 4   5. Need to walk in hospital room? [] 1   [x] 2   [] 3   [] 4   6. Climbing 3-5 steps with a railing? [] 1   [x] 2   [] 3   [] 4   © 2007, Trustees of [facility] [employer], under license to [employer]. All rights reserved        Score:  Initial: 16 Most Recent: X (Date: -- )    Interpretation of Tool:  Represents activities that are increasingly more difficult (i.e. Bed mobility, Transfers, Gait). Score 24 23 22-20 19-15 14-10 9-7 6     Modifier CH CI CJ CK CL CM CN      ? Mobility - Walking and Moving Around:     - CURRENT STATUS: CK - 40%-59% impaired, limited or restricted    - GOAL STATUS: CJ - 20%-39% impaired, limited or restricted    - D/C STATUS:  ---------------To be determined---------------  [place] / [place] / Product Type: Medicare /      Medical Necessity:     · Patient is expected to demonstrate progress in strength, range of motion and balance to decrease assistance required with theraputic exercises and functional mobility. Reason for Services/Other Comments:  · Patient continues to require present interventions due to patient's inability to perform theraputic exercises and functional mobility independently. Use of outcome tool(s) and clinical judgement create a POC that gives a: Clear prediction of patient's progress: LOW COMPLEXITY            TREATMENT:   (In addition to Assessment/Re-Assessment sessions the following treatments were rendered)     Pre-treatment Symptoms/Complaints:  Knee pain  Pain: Initial:      Post Session:  Did not rate, no complaints     Therapeutic Exercise: (45 Minutes):  Exercises per grid below to improve mobility and strength. Required minimal visual, verbal and manual cues to promote proper body alignment, promote proper body posture and promote proper body mechanics. Progressed range and repetitions as indicated.    Gait Training into inhaler and inhale into the lungs daily. 90 capsule 3   • albuterol 108 (90 Base) MCG/ACT inhaler Inhale 2 puffs into the lungs every 4 hours as needed for Shortness of Breath or Wheezing. 18 g 3   • aspirin 81 MG EC tablet Take 1 tablet by mouth daily.     • carvedilol (COREG) 6.25 MG tablet Take 1 tablet by mouth 2 times daily (with meals). 180 tablet 1   • EPINEPHrine (AUVI-Q) 0.3 MG/0.3ML auto-injector Inject 0.3 mLs into the muscle 1 time as needed for Anaphylaxis. 2 each 2   • diphenhydrAMINE (BENADRYL ALLERGY) 25 MG tablet Take 1 tablet by mouth every 6 hours as needed for Itching. 30 tablet 0   • Multiple Vitamins-Minerals (MULTIVITAMIN ADULT PO) Take 1 tablet by mouth daily.       No current facility-administered medications for this visit.        The following items on the Medicare Health Risk Assessment were found to be positive  6 a.) How many servings of Fruits and Vegetables do you have each day ( 1 serving = 1 piece of fruit, 1/2 cup fruits or vegetables): 1 per day     11b.) Bowel control problems: Sometimes     11d.) Bodily pain: Often         Vision and Hearing screens: Not performed    Advance Directive:   The patient has the following documents:  Power of  for Health Care    Cognitive/Functional Status: no evidence of cognitive dysfunction by direct observation    Opioid Review: Amy is not taking opioid medications.    Recent PHQ 2/9 Score:    PHQ 2:  Date Adult PHQ 2 Score Adult PHQ 2 Interpretation   9/28/2022 0 No further screening needed       PHQ 9:  Date Adult PHQ 9 Score Adult PHQ 9 Interpretation   1/30/2019 4 Minimal Depression       DEPRESSION ASSESSMENT/PLAN:  Depression screening is negative no further plan needed.     Body mass index is 20.82 kg/m².    BMI ASSESSMENT/PLAN:  Patient BMI is within normal range.     See Patient Instructions section.   Return in about 1 year (around 9/28/2023) for Medicare Wellness Visit.      OUTPATIENT PROGRESS NOTE    Subjective    Chief Complaint Medicare Wellness Visit (Subsequent)    HPI   78 yr old female here for [facility] subsequent visit and followup. Questionnaire was reviewed with patient. She has AD on file, denies depression. She is Active and is able to perform  activities of daily living.  Immunizations are not up to date -declines shingles,Tdap, influenza, covid 19    She needs refill of prednisone which she takes for PMR-she got refill in 8/22 but misplaced it    Has hx of CAD and CVA-taking aspirin  Denies CP or SOB  Follows with cardiology  She cannot tolerate statins    /76-just took her carvedilol before appt    Had knee injection last week 9/19/22-Dr. Berry            Medications  Medications were reviewed and updated today.    Histories  I have personally reviewed and updated the patient's past medical, past surgical, family and social histories during today's visit.      REVIEW OF SYSTEMS:      All other systems are reviewed and are negative except as documented in the HPI.     PHYSICAL EXAM:  Visit Vitals  BP (!) 162/76   Pulse 76   Temp 97.8 °F (36.6 °C)   Wt 58.5 kg (129 lb)   LMP  (LMP Unknown)   BMI 20.82 kg/m²     General: Alert, in no acute distress  Skin: Warm with normal turgor, no rash  Eyes: Eyelids and conjunctiva appear normal, EOM normal, HERMAN  Nose: Normal and patent, no erythema, discharge or polyps  Ears: R - TM is clear and normal appearing and canals normal        L - TM is clear and normal appearing and canals normal  Throat: Oropharynx is clear, no redness or exudate present   Neck: Supple with no significant adenopathy, no thyromegaly.  No bruits or JVD.  Lungs: Clear to auscultation, no wheezing, crackles or rhonchi noted  Heart: RRR  S1, S2, no murmur present, no extra sounds  Abdomen: Soft, nondistended, nontender, no guarding or masses, normoactive bowel   sounds, no abdominal bruits. No hepatosplenomegaly  Musculoskeletal: Spine normal  Extremities: Full range of motion upper and lower  (15 Minutes):  Gait training to improve and/or restore physical functioning as related to mobility, strength and balance. Ambulated 100 Feet (ft) (x 2) with Stand-by assistance using a Walker, rolling and minimal Safety awareness training;Verbal cues related to their stance phase to promote proper body alignment, promote proper body posture and promote proper body mechanics. Date:  9/24 Date:  9/25 Date:  9/26   ACTIVITY/EXERCISE AM PM AM PM AM PM   GROUP THERAPY  []  []  []  [x]  [x]  []   Ankle Pumps  10a 10a 15a 20a    Quad Sets  10a 10a 15a 20a    Gluteal Sets  10a 10a 15a 20a    Hip ABd/ADduction  10a 10a 15a 20a    Straight Leg Raises   10a 15a 20a    Knee Slides  10a 10a 15aa 20aa    Short Arc Quads    15a 20a    Long Arc Quads         Chair Slides    15a 20a             B = bilateral; AA = active assistive; A = active; P = passive      Treatment/Session Assessment:     Response to Treatment:  Pt. Making progress. Education:  [x] Home Exercises  [x] Fall Precautions  [] Hip Precautions [x] D/C Instruction Review  [x] Knee/Hip Prosthesis Review  [x] Walker Management/Safety [] Adaptive Equipment as Needed       Interdisciplinary Collaboration:   o Physical Therapist  o Rehabilitation Attendant    After treatment position/precautions:   o Supine in bed  o Bed/Chair-wheels locked  o Bed in low position  o Caregiver at bedside  o Call light within reach  o Family at bedside    Compliance with Program/Exercises: yes  No questions. Recommendations/Intent for next treatment session:  Treatment next visit will focus on increasing Ms. Werner's independence with bed mobility, transfers, gait training, strength/ROM exercises, modalities for pain, and patient education.       Total Treatment Duration:  PT Patient Time In/Time Out  Time In: 1030  Time Out: [address], PT extremities, with normal appearing joints.  No edema, normal strength in all extremities.  Neurologic: No focal deficits, normal sensation to soft touch,  normal balance  Psychiatric:  Normal mood and affect        Assessment & Plan   Diagnoses and associated orders for this visit:  1. Medicare annual wellness visit, subsequent  2. Chronic obstructive pulmonary disease, unspecified COPD type (CMS/[facility])  3. Polymyalgia rheumatica (CMS/[facility])  4. Atherosclerosis of native coronary artery of native heart without angina pectoris  5. History of CVA (cerebrovascular accident) without residual deficits  6. Mixed hyperlipidemia  7. Statin intolerance    Declines needed vaccines  Former Smoker with COPD-Cont spiriva daily, Albuterol as needed  BP High-just took meds, limit sodium. Cont carvedilol  Cont prednisone daily for PMR  Cont aspirin, does not tolerate statins  To notify if worsening/no improvement in symptoms   F/U 6 months

## 2023-02-27 ENCOUNTER — APPOINTMENT (RX ONLY)
Dept: URBAN - METROPOLITAN AREA CLINIC 330 | Facility: CLINIC | Age: 73
Setting detail: DERMATOLOGY
End: 2023-02-27

## 2023-02-27 DIAGNOSIS — L82.1 OTHER SEBORRHEIC KERATOSIS: ICD-10-CM

## 2023-02-27 DIAGNOSIS — I78.8 OTHER DISEASES OF CAPILLARIES: ICD-10-CM

## 2023-02-27 DIAGNOSIS — Z71.89 OTHER SPECIFIED COUNSELING: ICD-10-CM

## 2023-02-27 DIAGNOSIS — D18.0 HEMANGIOMA: ICD-10-CM

## 2023-02-27 DIAGNOSIS — D22 MELANOCYTIC NEVI: ICD-10-CM

## 2023-02-27 DIAGNOSIS — L81.4 OTHER MELANIN HYPERPIGMENTATION: ICD-10-CM

## 2023-02-27 DIAGNOSIS — L57.0 ACTINIC KERATOSIS: ICD-10-CM

## 2023-02-27 PROBLEM — D18.01 HEMANGIOMA OF SKIN AND SUBCUTANEOUS TISSUE: Status: ACTIVE | Noted: 2023-02-27

## 2023-02-27 PROBLEM — D22.5 MELANOCYTIC NEVI OF TRUNK: Status: ACTIVE | Noted: 2023-02-27

## 2023-02-27 PROBLEM — D22.72 MELANOCYTIC NEVI OF LEFT LOWER LIMB, INCLUDING HIP: Status: ACTIVE | Noted: 2023-02-27

## 2023-02-27 PROCEDURE — ? LIQUID NITROGEN

## 2023-02-27 PROCEDURE — 17000 DESTRUCT PREMALG LESION: CPT | Mod: 59

## 2023-02-27 PROCEDURE — 11301 SHAVE SKIN LESION 0.6-1.0 CM: CPT

## 2023-02-27 PROCEDURE — ? FULL BODY SKIN EXAM

## 2023-02-27 PROCEDURE — ? TREATMENT REGIMEN

## 2023-02-27 PROCEDURE — ? SHAVE REMOVAL

## 2023-02-27 PROCEDURE — ? COUNSELING

## 2023-02-27 PROCEDURE — 17003 DESTRUCT PREMALG LES 2-14: CPT

## 2023-02-27 PROCEDURE — ? BENIGN DESTRUCTION COSMETIC

## 2023-02-27 PROCEDURE — ? ADDITIONAL NOTES

## 2023-02-27 PROCEDURE — 99203 OFFICE O/P NEW LOW 30 MIN: CPT | Mod: 25

## 2023-02-27 ASSESSMENT — LOCATION DETAILED DESCRIPTION DERM
LOCATION DETAILED: EPIGASTRIC SKIN
LOCATION DETAILED: RIGHT MEDIAL FOREHEAD
LOCATION DETAILED: NASAL DORSUM
LOCATION DETAILED: RIGHT FOREHEAD
LOCATION DETAILED: LEFT INFERIOR UPPER BACK
LOCATION DETAILED: LEFT INFERIOR CENTRAL MALAR CHEEK
LOCATION DETAILED: LEFT MEDIAL SUPERIOR CHEST
LOCATION DETAILED: LEFT ANTERIOR PROXIMAL THIGH
LOCATION DETAILED: NASAL SUPRATIP
LOCATION DETAILED: NASAL ROOT
LOCATION DETAILED: LEFT SUPERIOR MEDIAL UPPER BACK
LOCATION DETAILED: RIGHT MEDIAL MALAR CHEEK

## 2023-02-27 ASSESSMENT — LOCATION SIMPLE DESCRIPTION DERM
LOCATION SIMPLE: LEFT THIGH
LOCATION SIMPLE: RIGHT CHEEK
LOCATION SIMPLE: ABDOMEN
LOCATION SIMPLE: NOSE
LOCATION SIMPLE: LEFT UPPER BACK
LOCATION SIMPLE: LEFT CHEEK
LOCATION SIMPLE: CHEST
LOCATION SIMPLE: RIGHT FOREHEAD

## 2023-02-27 ASSESSMENT — LOCATION ZONE DERM
LOCATION ZONE: LEG
LOCATION ZONE: NOSE
LOCATION ZONE: FACE
LOCATION ZONE: TRUNK

## 2023-02-27 NOTE — PROCEDURE: LIQUID NITROGEN
Render Note In Bullet Format When Appropriate: No
Number Of Freeze-Thaw Cycles: 1 freeze-thaw cycle
Detail Level: Detailed
Post-Care Instructions: I reviewed with the patient in detail post-care instructions. Patient is to wear sunprotection, and avoid picking at any of the treated lesions. Pt may apply Vaseline to crusted or scabbing areas.
Consent: The patient's consent was obtained including but not limited to risks of crusting, scabbing, blistering, scarring, darker or lighter pigmentary change, recurrence, incomplete removal and infection.
Show Aperture Variable?: Yes
Duration Of Freeze Thaw-Cycle (Seconds): 0

## 2023-02-27 NOTE — PROCEDURE: ADDITIONAL NOTES
Render Risk Assessment In Note?: no
Detail Level: Simple
Additional Notes: Recommended scheduling IPL \\n\\nPt insisting treating with electrodessication. Informed pt that it would be $115 to treat lesions today & there is a high risk of recurrence

## 2023-02-27 NOTE — PROCEDURE: MIPS QUALITY
Quality 226: Preventive Care And Screening: Tobacco Use: Screening And Cessation Intervention: Patient screened for tobacco use and is an ex/non-smoker
Quality 111:Pneumonia Vaccination Status For Older Adults: Pneumococcal vaccine (PPSV23) administered on or after patient’s 60th birthday and before the end of the measurement period
Detail Level: Generalized
Quality 130: Documentation Of Current Medications In The Medical Record: Current Medications Documented
Quality 431: Preventive Care And Screening: Unhealthy Alcohol Use - Screening: Patient not identified as an unhealthy alcohol user when screened for unhealthy alcohol use using a systematic screening method
Quality 110: Preventive Care And Screening: Influenza Immunization: Influenza Immunization previously received during influenza season

## 2023-02-27 NOTE — PROCEDURE: SHAVE REMOVAL
Medical Necessity Information: It is in your best interest to select a reason for this procedure from the list below. All of these items fulfill various CMS LCD requirements except the new and changing color options.
Medical Necessity Clause: This procedure was medically necessary because the lesion that was treated was:
Lab: 6
Lab Facility: 0
Detail Level: Detailed
Was A Bandage Applied: Yes
Size Of Lesion In Cm (Required): 0.6
Depth Of Shave: dermis
Biopsy Method: Dermablade
Anesthesia Type: 1% lidocaine with epinephrine
Hemostasis: Drysol
Wound Care: Petrolatum
Render Path Notes In Note?: No
Consent was obtained from the patient. The risks and benefits to therapy were discussed in detail. Specifically, the risks of infection, scarring, bleeding, prolonged wound healing, incomplete removal, allergy to anesthesia, nerve injury and recurrence were addressed. Prior to the procedure, the treatment site was clearly identified and confirmed by the patient. All components of Universal Protocol/PAUSE Rule completed.
Post-Care Instructions: I reviewed with the patient in detail post-care instructions. Patient is to keep the biopsy site dry overnight, and then apply bacitracin twice daily until healed. Patient may apply hydrogen peroxide soaks to remove any crusting.
Notification Instructions: Patient will be notified of pathology results. However, patient instructed to call the office if not contacted within 2 weeks.
Billing Type: Third-Party Bill

## 2023-02-27 NOTE — PROCEDURE: BENIGN DESTRUCTION COSMETIC
Post-Care Instructions: I reviewed with the patient in detail post-care instructions. Patient is to wear sunprotection, and avoid picking at any of the treated lesions. Pt may apply Vaseline to crusted or scabbing areas.
Detail Level: Detailed
Price (Use Numbers Only, No Special Characters Or $): 115
Anesthesia Volume In Cc: 0.5
Consent: The patient's consent was obtained including but not limited to risks of crusting, scabbing, blistering, scarring, darker or lighter pigmentary change, recurrence, incomplete removal and infection.

## 2023-06-13 PROBLEM — I82.409 DVT (DEEP VENOUS THROMBOSIS) (HCC): Status: ACTIVE | Noted: 2023-06-13

## 2023-06-13 PROBLEM — J32.0 CHRONIC MAXILLARY SINUSITIS: Status: ACTIVE | Noted: 2019-12-03

## 2023-06-13 PROBLEM — F32.9 MAJOR DEPRESSIVE DISORDER, SINGLE EPISODE, UNSPECIFIED: Status: ACTIVE | Noted: 2023-06-13

## 2023-06-13 PROBLEM — I10 HTN (HYPERTENSION): Status: ACTIVE | Noted: 2023-06-13

## 2023-08-22 ENCOUNTER — OFFICE VISIT (OUTPATIENT)
Dept: ORTHOPEDIC SURGERY | Age: 73
End: 2023-08-22
Payer: MEDICARE

## 2023-08-22 DIAGNOSIS — M19.012 ARTHRITIS OF LEFT SHOULDER REGION: ICD-10-CM

## 2023-08-22 DIAGNOSIS — M25.511 BILATERAL SHOULDER PAIN, UNSPECIFIED CHRONICITY: Primary | ICD-10-CM

## 2023-08-22 DIAGNOSIS — M75.101 RIGHT ROTATOR CUFF TEAR ARTHROPATHY: ICD-10-CM

## 2023-08-22 DIAGNOSIS — M25.512 BILATERAL SHOULDER PAIN, UNSPECIFIED CHRONICITY: Primary | ICD-10-CM

## 2023-08-22 DIAGNOSIS — M19.011 ARTHRITIS OF RIGHT SHOULDER REGION: ICD-10-CM

## 2023-08-22 DIAGNOSIS — M12.811 RIGHT ROTATOR CUFF TEAR ARTHROPATHY: ICD-10-CM

## 2023-08-22 PROCEDURE — 1123F ACP DISCUSS/DSCN MKR DOCD: CPT | Performed by: ORTHOPAEDIC SURGERY

## 2023-08-22 PROCEDURE — 99213 OFFICE O/P EST LOW 20 MIN: CPT | Performed by: ORTHOPAEDIC SURGERY

## 2023-08-22 PROCEDURE — 3017F COLORECTAL CA SCREEN DOC REV: CPT | Performed by: ORTHOPAEDIC SURGERY

## 2023-08-22 PROCEDURE — 1090F PRES/ABSN URINE INCON ASSESS: CPT | Performed by: ORTHOPAEDIC SURGERY

## 2023-08-22 PROCEDURE — G8399 PT W/DXA RESULTS DOCUMENT: HCPCS | Performed by: ORTHOPAEDIC SURGERY

## 2023-08-22 PROCEDURE — G8428 CUR MEDS NOT DOCUMENT: HCPCS | Performed by: ORTHOPAEDIC SURGERY

## 2023-08-22 PROCEDURE — 1036F TOBACCO NON-USER: CPT | Performed by: ORTHOPAEDIC SURGERY

## 2023-08-22 PROCEDURE — G8421 BMI NOT CALCULATED: HCPCS | Performed by: ORTHOPAEDIC SURGERY

## 2023-08-22 NOTE — PROGRESS NOTES
Name: Elmer Rivera  YOB: 1950  Gender: female  MRN: [id number]    CC:   Chief Complaint   Patient presents with    Follow-up     B/L shoulders        HPI: Patient presents for recheck right greater than left shoulder pain. Patient had a right subacromial injection on 6-14-23. She is also status post large rotator cuff repair and open biceps tenodesis in 2019. She did have significant improvement until recently when she tried to scrub some porch furniture which aggravated things. Therapy has been helping.     Allergies   Allergen Reactions    Penicillins Angioedema     Past Medical History:   Diagnosis Date    Arthritis     osteo    Environmental allergies     Hypertension     controlled with med    Migraine     Neuropathy     feet/ legs    Psychiatric disorder     pt takes med for anxiety, depression    Right shoulder pain     RLS (restless legs syndrome)     Rosacea     Sleep apnea     sleep study completed 3/5/19-- has not gotten results yet    Thromboembolus ([address]) 12/03/2019    DVT-- RIGHT KNEE- s/p replaced--per pt-- \" still there\"-- followed by dr Jose Holman     Past Surgical History:   Procedure Laterality Date    APPENDECTOMY      DILATION AND CURETTAGE OF UTERUS      ORTHOPEDIC SURGERY Right 09/2018    knee replaced    [address] [facility]  2014     cardiac cath     Family History   Problem Relation Age of Onset    Heart Disease Father     Diabetes Father     Lung Disease Father     Breast Cancer Paternal Aunt 76     Social History     Socioeconomic History    Marital status:      Spouse name: Not on file    Number of children: Not on file    Years of education: Not on file    Highest education level: Not on file   Occupational History    Not on file   Tobacco Use    Smoking status: Never    Smokeless tobacco: Never   Substance and Sexual Activity    Alcohol use: Yes    Drug use: No    Sexual activity: Not on file   Other Topics Concern    Not on file   Social

## 2023-09-07 NOTE — PROGRESS NOTES
Name: Bridgette López  YOB: 1950  Gender: female  MRN: [id number]    CC:   Chief Complaint   Patient presents with    Shoulder Pain     Right shoulder CSI        HPI: Patient presents for right shoulder injection. Recall patient last had a subacromial injection on 6-14-23 which gave her significant improvement until about 4 weeks ago when she aggravated it by scrubbing some porch furniture.      Allergies   Allergen Reactions    Penicillins Angioedema     Past Medical History:   Diagnosis Date    Arthritis     osteo    Environmental allergies     Hypertension     controlled with med    Migraine     Neuropathy     feet/ legs    Psychiatric disorder     pt takes med for anxiety, depression    Right shoulder pain     RLS (restless legs syndrome)     Rosacea     Sleep apnea     sleep study completed 3/5/19-- has not gotten results yet    Thromboembolus ([address]) 12/03/2019    DVT-- RIGHT KNEE- s/p replaced--per pt-- \" still there\"-- followed by dr Ana Mauricio     Past Surgical History:   Procedure Laterality Date    APPENDECTOMY      DILATION AND CURETTAGE OF UTERUS      ORTHOPEDIC SURGERY Right 09/2018    knee replaced    [address] [facility]  2014     cardiac cath     Family History   Problem Relation Age of Onset    Heart Disease Father     Diabetes Father     Lung Disease Father     Breast Cancer Paternal Aunt 76     Social History     Socioeconomic History    Marital status:      Spouse name: Not on file    Number of children: Not on file    Years of education: Not on file    Highest education level: Not on file   Occupational History    Not on file   Tobacco Use    Smoking status: Never    Smokeless tobacco: Never   Substance and Sexual Activity    Alcohol use: Yes    Drug use: No    Sexual activity: Not on file   Other Topics Concern    Not on file   Social History Narrative    Not on file     Social Determinants of Health     Financial Resource Strain: Not on file   Food

## 2023-09-12 ENCOUNTER — OFFICE VISIT (OUTPATIENT)
Dept: ORTHOPEDIC SURGERY | Age: 73
End: 2023-09-12

## 2023-09-12 DIAGNOSIS — M25.512 BILATERAL SHOULDER PAIN, UNSPECIFIED CHRONICITY: ICD-10-CM

## 2023-09-12 DIAGNOSIS — M19.011 ARTHRITIS OF RIGHT SHOULDER REGION: Primary | ICD-10-CM

## 2023-09-12 DIAGNOSIS — M25.511 BILATERAL SHOULDER PAIN, UNSPECIFIED CHRONICITY: ICD-10-CM

## 2023-09-12 DIAGNOSIS — M75.101 RIGHT ROTATOR CUFF TEAR ARTHROPATHY: ICD-10-CM

## 2023-09-12 DIAGNOSIS — M12.811 RIGHT ROTATOR CUFF TEAR ARTHROPATHY: ICD-10-CM

## 2023-09-12 RX ORDER — METHYLPREDNISOLONE ACETATE 40 MG/ML
80 INJECTION, SUSPENSION INTRA-ARTICULAR; INTRALESIONAL; INTRAMUSCULAR; SOFT TISSUE ONCE
Status: COMPLETED | OUTPATIENT
Start: 2023-09-12 | End: 2023-09-12

## 2023-09-12 RX ADMIN — METHYLPREDNISOLONE ACETATE 80 MG: 40 INJECTION, SUSPENSION INTRA-ARTICULAR; INTRALESIONAL; INTRAMUSCULAR; SOFT TISSUE at 15:41

## 2023-12-12 ENCOUNTER — OFFICE VISIT (OUTPATIENT)
Dept: ORTHOPEDIC SURGERY | Age: 73
End: 2023-12-12
Payer: MEDICARE

## 2023-12-12 ENCOUNTER — TELEPHONE (OUTPATIENT)
Dept: ORTHOPEDIC SURGERY | Age: 73
End: 2023-12-12

## 2023-12-12 DIAGNOSIS — M75.101 RIGHT ROTATOR CUFF TEAR ARTHROPATHY: ICD-10-CM

## 2023-12-12 DIAGNOSIS — M19.011 ARTHRITIS OF RIGHT SHOULDER REGION: Primary | ICD-10-CM

## 2023-12-12 DIAGNOSIS — M12.811 RIGHT ROTATOR CUFF TEAR ARTHROPATHY: ICD-10-CM

## 2023-12-12 PROCEDURE — G8421 BMI NOT CALCULATED: HCPCS | Performed by: ORTHOPAEDIC SURGERY

## 2023-12-12 PROCEDURE — 3017F COLORECTAL CA SCREEN DOC REV: CPT | Performed by: ORTHOPAEDIC SURGERY

## 2023-12-12 PROCEDURE — 1036F TOBACCO NON-USER: CPT | Performed by: ORTHOPAEDIC SURGERY

## 2023-12-12 PROCEDURE — G8428 CUR MEDS NOT DOCUMENT: HCPCS | Performed by: ORTHOPAEDIC SURGERY

## 2023-12-12 PROCEDURE — 1123F ACP DISCUSS/DSCN MKR DOCD: CPT | Performed by: ORTHOPAEDIC SURGERY

## 2023-12-12 PROCEDURE — 99214 OFFICE O/P EST MOD 30 MIN: CPT | Performed by: ORTHOPAEDIC SURGERY

## 2023-12-12 PROCEDURE — G8484 FLU IMMUNIZE NO ADMIN: HCPCS | Performed by: ORTHOPAEDIC SURGERY

## 2023-12-12 PROCEDURE — 1090F PRES/ABSN URINE INCON ASSESS: CPT | Performed by: ORTHOPAEDIC SURGERY

## 2023-12-12 PROCEDURE — G8399 PT W/DXA RESULTS DOCUMENT: HCPCS | Performed by: ORTHOPAEDIC SURGERY

## 2023-12-12 NOTE — PATIENT INSTRUCTIONS
Shoulder Joint Replacement  Although shoulder joint replacement is less common than knee or hip replacement, it is just as successful in relieving joint pain. Shoulder replacement surgery was first performed in the [place] in the 1950s to treat severe shoulder fractures. Over the years, shoulder joint replacement has come to be used for many other painful conditions of the shoulder, such as different forms of arthritis. Today, about 53,000 people in the [place] have shoulder replacement surgery each year, according to the [employer] for [address] [place] [address] and [employer]. This compares to more than 900,000 Americans a year who have knee and hip replacement surgery. If nonsurgical treatments like medications and activity changes are no longer helpful for relieving pain, you may want to consider shoulder joint replacement surgery. Joint replacement surgery is a safe and effective procedure to relieve pain and help you resume everyday activities. Whether you have just begun exploring treatment options or have already decided to have shoulder joint replacement surgery, this article will help you understand more about this valuable procedure. Anatomy  Your shoulder is made up of three bones: your upper arm bone (humerus), your shoulder blade (scapula), and your collarbone (clavicle). The shoulder is a ball-and-socket joint: The ball, or head, of your upper arm bone fits into a shallow socket in your shoulder blade. This socket is called the glenoid. The surfaces of the bones where they touch are covered with articular cartilage, a smooth substance that protects the bones and enables them to move easily. A thin, smooth tissue called synovial membrane covers all remaining surfaces inside the shoulder joint. In a healthy shoulder, this membrane makes a small amount of fluid that lubricates the cartilage and eliminates almost any friction in your shoulder.     The muscles and tendons that surround the

## 2023-12-12 NOTE — PROGRESS NOTES
Name: Janelle Wheeler  YOB: 1950  Gender: female  MRN: [id number]    CC:   Chief Complaint   Patient presents with    Shoulder Pain     Right shoulder        HPI: Patient presents for follow-up of right shoulder pain. Patient has had right shoulder injection on 9-12-23. No new injuries comparison to previous visit. She only got about a month relief and would like to talk about surgery.     Allergies   Allergen Reactions    Penicillins Angioedema     Past Medical History:   Diagnosis Date    Arthritis     osteo    Environmental allergies     Hypertension     controlled with med    Migraine     Neuropathy     feet/ legs    Psychiatric disorder     pt takes med for anxiety, depression    Right shoulder pain     RLS (restless legs syndrome)     Rosacea     Sleep apnea     sleep study completed 3/5/19-- has not gotten results yet    Thromboembolus ([address]) 12/03/2019    DVT-- RIGHT KNEE- s/p replaced--per pt-- \" still there\"-- followed by dr Echo Walters     Past Surgical History:   Procedure Laterality Date    APPENDECTOMY      DILATION AND CURETTAGE OF UTERUS      ORTHOPEDIC SURGERY Right 09/2018    knee replaced    [address] [facility]  2014     cardiac cath     Family History   Problem Relation Age of Onset    Heart Disease Father     Diabetes Father     Lung Disease Father     Breast Cancer Paternal Aunt 76     Social History     Socioeconomic History    Marital status:      Spouse name: Not on file    Number of children: Not on file    Years of education: Not on file    Highest education level: Not on file   Occupational History    Not on file   Tobacco Use    Smoking status: Never    Smokeless tobacco: Never   Substance and Sexual Activity    Alcohol use: Yes    Drug use: No    Sexual activity: Not on file   Other Topics Concern    Not on file   Social History Narrative    Not on file     Social Determinants of Health     Financial Resource Strain: Not on file   Food

## 2023-12-13 ENCOUNTER — TELEPHONE (OUTPATIENT)
Dept: ORTHOPEDIC SURGERY | Age: 73
End: 2023-12-13

## 2023-12-19 DIAGNOSIS — M12.811 RIGHT ROTATOR CUFF TEAR ARTHROPATHY: ICD-10-CM

## 2023-12-19 DIAGNOSIS — M75.101 RIGHT ROTATOR CUFF TEAR ARTHROPATHY: ICD-10-CM

## 2023-12-19 DIAGNOSIS — M19.011 ARTHRITIS OF RIGHT SHOULDER REGION: ICD-10-CM

## 2024-01-09 ENCOUNTER — OFFICE VISIT (OUTPATIENT)
Dept: ORTHOPEDIC SURGERY | Age: 74
End: 2024-01-09
Payer: MEDICARE

## 2024-01-09 DIAGNOSIS — M12.811 RIGHT ROTATOR CUFF TEAR ARTHROPATHY: ICD-10-CM

## 2024-01-09 DIAGNOSIS — M19.011 ARTHRITIS OF RIGHT SHOULDER REGION: Primary | ICD-10-CM

## 2024-01-09 DIAGNOSIS — M75.101 RIGHT ROTATOR CUFF TEAR ARTHROPATHY: ICD-10-CM

## 2024-01-09 PROCEDURE — 99214 OFFICE O/P EST MOD 30 MIN: CPT | Performed by: ORTHOPAEDIC SURGERY

## 2024-01-09 PROCEDURE — 3017F COLORECTAL CA SCREEN DOC REV: CPT | Performed by: ORTHOPAEDIC SURGERY

## 2024-01-09 PROCEDURE — G8399 PT W/DXA RESULTS DOCUMENT: HCPCS | Performed by: ORTHOPAEDIC SURGERY

## 2024-01-09 PROCEDURE — G8484 FLU IMMUNIZE NO ADMIN: HCPCS | Performed by: ORTHOPAEDIC SURGERY

## 2024-01-09 PROCEDURE — G8421 BMI NOT CALCULATED: HCPCS | Performed by: ORTHOPAEDIC SURGERY

## 2024-01-09 PROCEDURE — 1036F TOBACCO NON-USER: CPT | Performed by: ORTHOPAEDIC SURGERY

## 2024-01-09 PROCEDURE — 1090F PRES/ABSN URINE INCON ASSESS: CPT | Performed by: ORTHOPAEDIC SURGERY

## 2024-01-09 PROCEDURE — 1123F ACP DISCUSS/DSCN MKR DOCD: CPT | Performed by: ORTHOPAEDIC SURGERY

## 2024-01-09 PROCEDURE — G8427 DOCREV CUR MEDS BY ELIG CLIN: HCPCS | Performed by: ORTHOPAEDIC SURGERY

## 2024-01-09 NOTE — PROGRESS NOTES
Name: Janell Rangel  YOB: 1950  Gender: female  MRN: [id number]    CC:   Chief Complaint   Patient presents with    Shoulder Pain     Right shoulder CT results        HPI: Patient presents for follow-up of right shoulder pain.  Patient has had glenohumeral injection on 9- with only one month relief.  CT scan of the right shoulder was obtained for potential surgical planning.    Allergies   Allergen Reactions    Penicillins Angioedema     Past Medical History:   Diagnosis Date    Arthritis     osteo    Environmental allergies     Hypertension     controlled with med    Migraine     Neuropathy     feet/ legs    Psychiatric disorder     pt takes med for anxiety, depression    Right shoulder pain     RLS (restless legs syndrome)     Rosacea     Sleep apnea     sleep study completed 3/5/19-- has not gotten results yet    Thromboembolus ([facility]) 12/03/2019    DVT-- RIGHT KNEE- s/p replaced--per pt-- \" still there\"-- followed by dr knowles     Past Surgical History:   Procedure Laterality Date    APPENDECTOMY      DILATION AND CURETTAGE OF UTERUS      ORTHOPEDIC SURGERY Right 09/2018    knee replaced    [place] [facility] SUR[facility]ERY  2014     cardiac cath     Family History   Problem Relation Age of Onset    Heart Disease Father     Diabetes Father     Lung Disease Father     Breast Cancer Paternal Aunt 75     Social History     Socioeconomic History    Marital status:      Spouse name: Not on file    Number of children: Not on file    Years of education: Not on file    Highest education level: Not on file   Occupational History    Not on file   Tobacco Use    Smoking status: Never    Smokeless tobacco: Never   Substance and Sexual Activity    Alcohol use: Yes    Drug use: No    Sexual activity: Not on file   Other Topics Concern    Not on file   Social History Narrative    Not on file     Social Determinants of Health     Financial Resource Strain: Not on file   Food Insecurity:

## 2024-01-09 NOTE — PATIENT INSTRUCTIONS
arm will be in a sling. You will need the sling to support and protect your shoulder for the first 2 to 6 weeks after surgery, depending on the complexity of your surgery and your surgeon's preference.      Wound care. You will have staples running along your wound or a suture beneath your skin. The staples will be removed several weeks after surgery. A dissolving suture beneath your skin will not require removal.    Avoid soaking the wound in water until it has thoroughly sealed and dried. You may continue to bandage the wound to prevent irritation from clothing.    Activity. Exercise is a critical component of home care, particularly during the first few weeks after surgery. Follow your surgeon's home exercise plan to help you regain strength. Most patients are able to perform simple activities such as eating, dressing, and grooming, within 2 weeks after surgery. Some pain with activity and at night is common for several weeks after surgery.    You are not allowed to drive a car for 2 to 6 weeks after surgery.    Do's and Don'ts  The success of your surgery will depend largely on how well you follow your orthopaedic surgeon's instructions at home during the first few weeks after surgery. Here are some common do's and don'ts for when you return home:    Don't use the arm to push yourself up in bed or from a chair because this requires forceful contraction of muscles.  Do follow the program of home exercises prescribed for you. You may need to do the exercises 2 to 3 times a day for a month or more.  Don't overdo it! If your shoulder pain was severe before the surgery, the experience of pain-free motion may lull you into thinking that you can do more than is prescribed. Early overuse of the shoulder may result in severe limitations in motion.  Don't lift anything heavier than a glass of water for the first 2 to 4 weeks after surgery.  Do ask for assistance. Your physician may be able to recommend an agency or

## 2024-01-10 DIAGNOSIS — M19.011 ARTHRITIS OF RIGHT SHOULDER REGION: Primary | ICD-10-CM

## 2024-01-10 DIAGNOSIS — M75.101 RIGHT ROTATOR CUFF TEAR ARTHROPATHY: ICD-10-CM

## 2024-01-10 DIAGNOSIS — M12.811 RIGHT ROTATOR CUFF TEAR ARTHROPATHY: ICD-10-CM

## 2024-01-15 DIAGNOSIS — M19.011 ARTHRITIS OF RIGHT SHOULDER REGION: Primary | ICD-10-CM

## 2024-01-15 DIAGNOSIS — M75.101 RIGHT ROTATOR CUFF TEAR ARTHROPATHY: ICD-10-CM

## 2024-01-15 DIAGNOSIS — M12.811 RIGHT ROTATOR CUFF TEAR ARTHROPATHY: ICD-10-CM

## 2024-01-26 ENCOUNTER — TELEPHONE (OUTPATIENT)
Dept: ORTHOPEDIC SURGERY | Age: 74
End: 2024-01-26

## 2024-01-26 NOTE — TELEPHONE ENCOUNTER
See notes in media dated 1/26/24. Per Dr. Millard (PCP) pt is cleared for surgery with Dr. Joy. Christina Valadez 48-72 hours before surgery. Surgery is scheduled for 3/15/24.

## 2024-02-12 DIAGNOSIS — M75.101 RIGHT ROTATOR CUFF TEAR ARTHROPATHY: ICD-10-CM

## 2024-02-12 DIAGNOSIS — M12.811 RIGHT ROTATOR CUFF TEAR ARTHROPATHY: ICD-10-CM

## 2024-02-12 DIAGNOSIS — M19.011 ARTHRITIS OF RIGHT SHOULDER REGION: Primary | ICD-10-CM

## 2024-02-20 ENCOUNTER — HOSPITAL ENCOUNTER (OUTPATIENT)
Dept: SURGERY | Age: 74
Discharge: HOME | End: 2024-02-23
Payer: MEDICARE

## 2024-02-20 VITALS
OXYGEN SATURATION: 92 % | BODY MASS INDEX: 28.84 KG/M2 | DIASTOLIC BLOOD PRESSURE: 70 MMHG | SYSTOLIC BLOOD PRESSURE: 129 MMHG | HEART RATE: 68 BPM | WEIGHT: 162.8 LBS | HEIGHT: 63 IN | TEMPERATURE: 97.5 F | RESPIRATION RATE: 16 BRPM

## 2024-02-20 LAB
ANION GAP SERPL CALC-SCNC: 3 MMOL/L (ref 2–11)
BUN SERPL-MCNC: 26 MG/DL (ref 8–23)
CALCIUM SERPL-MCNC: 9.6 MG/DL (ref 8.3–10.4)
CHLORIDE SERPL-SCNC: 110 MMOL/L (ref 103–113)
CO2 SERPL-SCNC: 26 MMOL/L (ref 21–32)
CREAT SERPL-MCNC: 0.99 MG/DL (ref 0.6–1)
ERYTHROCYTE [DISTWIDTH] IN BLOOD BY AUTOMATED COUNT: 13.4 % (ref 11.9–14.6)
GLUCOSE SERPL-MCNC: 98 MG/DL (ref 65–100)
HCT VFR BLD AUTO: 35.8 % (ref 35.8–46.3)
HGB BLD-MCNC: 11.6 G/DL (ref 11.7–15.4)
MCH RBC QN AUTO: 29.4 PG (ref 26.1–32.9)
MCHC RBC AUTO-ENTMCNC: 32.4 G/DL (ref 31.4–35)
MCV RBC AUTO: 90.6 FL (ref 82–102)
NRBC # BLD: 0 K/UL (ref 0–0.2)
PLATELET # BLD AUTO: 238 K/UL (ref 150–450)
PMV BLD AUTO: 10.3 FL (ref 9.4–12.3)
POTASSIUM SERPL-SCNC: 4.1 MMOL/L (ref 3.5–5.1)
RBC # BLD AUTO: 3.95 M/UL (ref 4.05–5.2)
SODIUM SERPL-SCNC: 139 MMOL/L (ref 136–146)
WBC # BLD AUTO: 6.4 K/UL (ref 4.3–11.1)

## 2024-02-20 PROCEDURE — 87641 MR-STAPH DNA AMP PROBE: CPT

## 2024-02-20 PROCEDURE — 85027 COMPLETE CBC AUTOMATED: CPT

## 2024-02-20 PROCEDURE — 80048 BASIC METABOLIC PNL TOTAL CA: CPT

## 2024-02-20 RX ORDER — ESCITALOPRAM OXALATE 20 MG/1
20 TABLET ORAL DAILY
COMMUNITY
Start: 2024-01-26

## 2024-02-20 NOTE — PERIOP NOTE
Patient verified name and     Order for consent not found in EHR patient verified.     Type 3 surgery, Walk in assessment complete.    Labs per surgeon: none;   Labs per anesthesia protocol: MRSA, BMP, CBC, T&S s/h dos; results pending  EKG: not needed per anesthesia protocol    MRSA/MSSA swab collected; pharmacy to review and dose antibiotic as appropriate.     Hospital approved surgical skin cleanser and instructions given per hospital policy.    Patient provided with and instructed on educational handouts including Guide to Surgery, Pain Management, Hand Hygiene, Blood Transfusion Education, and [facility] Brochure.    Patient answered medical/surgical history questions at their best of ability. All prior to admission medications documented in [facility]. Original medication prescription bottle were visualized during patient appointment.     Patient instructed to hold all vitamins 7 days prior to surgery and NSAIDS 5 days prior to surgery, patient verbalized understanding.     Patient teach back successful and patient demonstrates knowledge of instructions.    How to Use Your Incentive Spirometer       About Your Incentive Spirometer  An incentive spirometer is a device that will expand your lungs by helping you to breathe more deeply and fully. The parts of your incentive spirometer are labeled in Figure 1.    Using your incentive spirometer  When you're using your incentive spirometer, make sure to breathe through your mouth. If you breathe through your nose, the incentive spirometer won't work properly. You can hold your nose if you have trouble. DO NOT BLOW INTO THE DEVICE. If you feel dizzy at any time, stop and rest. Try again at a later time.  Sit upright in a chair or in bed. Hold the incentive spirometer at eye level.   Put the mouthpiece in your mouth and close your lips tightly around it. Slowly breathe out (exhale) completely.  Breathe in (inhale) slowly through your mouth as deeply as

## 2024-02-21 LAB
MRSA DNA SPEC QL NAA+PROBE: NOT DETECTED
S AUREUS CPE NOSE QL NAA+PROBE: NOT DETECTED

## 2024-02-23 NOTE — PERIOP NOTE
Preop department called to notify patient of arrival time for scheduled procedure. Instructions given to   - Arrive at [facility] Entrance [address].  - Remain NPO after midnight, unless otherwise indicated, including gum, mints, and ice chips.   - Have a responsible adult to drive patient to the hospital, stay during surgery, and patient will need supervision 24 hours after anesthesia.   - Use antibacterial soap in shower the night before surgery and on the morning of surgery.       Was patient contacted: yes, pt  Voicemail left: n/a  Numbers contacted: [phone]   Arrival time: 0600

## 2024-02-23 NOTE — PERIOP NOTE
Preop department called to notify patient of arrival time for scheduled procedure. Instructions given to   - Arrive at [facility] Entrance [address].  - Remain NPO after midnight, unless otherwise indicated, including gum, mints, and ice chips.   - Have a responsible adult to drive patient to the hospital, stay during surgery, and patient will need supervision 24 hours after anesthesia.   - Use antibacterial soap in shower the night before surgery and on the morning of surgery.       Was patient contacted: yes, pt  Voicemail left: n/a  Numbers contacted: [phone]   Arrival time: 0930

## 2024-02-26 ENCOUNTER — APPOINTMENT (OUTPATIENT)
Dept: GENERAL RADIOLOGY | Age: 74
End: 2024-02-26
Attending: ORTHOPAEDIC SURGERY
Payer: MEDICARE

## 2024-02-26 ENCOUNTER — ANESTHESIA EVENT (OUTPATIENT)
Dept: SURGERY | Age: 74
End: 2024-02-26
Payer: MEDICARE

## 2024-02-26 ENCOUNTER — HOSPITAL ENCOUNTER (OUTPATIENT)
Age: 74
Setting detail: OBSERVATION
Discharge: HOME | End: 2024-02-27
Attending: ORTHOPAEDIC SURGERY | Admitting: ORTHOPAEDIC SURGERY
Payer: MEDICARE

## 2024-02-26 ENCOUNTER — ANESTHESIA (OUTPATIENT)
Dept: SURGERY | Age: 74
End: 2024-02-26
Payer: MEDICARE

## 2024-02-26 DIAGNOSIS — M19.011 ARTHRITIS OF RIGHT SHOULDER REGION: Primary | ICD-10-CM

## 2024-02-26 PROBLEM — Z98.890 STATUS POST SURGERY: Status: ACTIVE | Noted: 2024-02-26

## 2024-02-26 LAB
ABO + RH BLD: NORMAL
BLOOD GROUP ANTIBODIES SERPL: NORMAL
SPECIMEN EXP DATE BLD: NORMAL

## 2024-02-26 PROCEDURE — C1713 ANCHOR/SCREW BN/BN,TIS/BN: HCPCS | Performed by: ORTHOPAEDIC SURGERY

## 2024-02-26 PROCEDURE — 2580000003 HC RX 258: Performed by: PHYSICIAN ASSISTANT

## 2024-02-26 PROCEDURE — 6360000002 HC RX W HCPCS: Performed by: PHYSICIAN ASSISTANT

## 2024-02-26 PROCEDURE — 2700000000 HC OXYGEN THERAPY PER DAY

## 2024-02-26 PROCEDURE — 2580000003 HC RX 258: Performed by: NURSE ANESTHETIST, CERTIFIED REGISTERED

## 2024-02-26 PROCEDURE — 86850 RBC ANTIBODY SCREEN: CPT

## 2024-02-26 PROCEDURE — 51701 INSERT BLADDER CATHETER: CPT

## 2024-02-26 PROCEDURE — 3700000001 HC ADD 15 MINUTES (ANESTHESIA): Performed by: ORTHOPAEDIC SURGERY

## 2024-02-26 PROCEDURE — 2580000003 HC RX 258: Performed by: ANESTHESIOLOGY

## 2024-02-26 PROCEDURE — 6370000000 HC RX 637 (ALT 250 FOR IP): Performed by: ANESTHESIOLOGY

## 2024-02-26 PROCEDURE — 6370000000 HC RX 637 (ALT 250 FOR IP): Performed by: PHYSICIAN ASSISTANT

## 2024-02-26 PROCEDURE — C1776 JOINT DEVICE (IMPLANTABLE): HCPCS | Performed by: ORTHOPAEDIC SURGERY

## 2024-02-26 PROCEDURE — 73020 X-RAY EXAM OF SHOULDER: CPT

## 2024-02-26 PROCEDURE — 2500000003 HC RX 250 WO HCPCS: Performed by: NURSE ANESTHETIST, CERTIFIED REGISTERED

## 2024-02-26 PROCEDURE — 6360000002 HC RX W HCPCS: Performed by: ORTHOPAEDIC SURGERY

## 2024-02-26 PROCEDURE — 86901 BLOOD TYPING SEROLOGIC RH(D): CPT

## 2024-02-26 PROCEDURE — 7100000000 HC PACU RECOVERY - FIRST 15 MIN: Performed by: ORTHOPAEDIC SURGERY

## 2024-02-26 PROCEDURE — 6360000002 HC RX W HCPCS: Performed by: ANESTHESIOLOGY

## 2024-02-26 PROCEDURE — 23472 RECONSTRUCT SHOULDER JOINT: CPT | Performed by: ORTHOPAEDIC SURGERY

## 2024-02-26 PROCEDURE — 3600000005 HC SURGERY LEVEL 5 BASE: Performed by: ORTHOPAEDIC SURGERY

## 2024-02-26 PROCEDURE — 86900 BLOOD TYPING SEROLOGIC ABO: CPT

## 2024-02-26 PROCEDURE — 3600000015 HC SURGERY LEVEL 5 ADDTL 15MIN: Performed by: ORTHOPAEDIC SURGERY

## 2024-02-26 PROCEDURE — 94760 N-INVAS EAR/PLS OXIMETRY 1: CPT

## 2024-02-26 PROCEDURE — 51798 US URINE CAPACITY MEASURE: CPT

## 2024-02-26 PROCEDURE — 20985 CPTR-ASST DIR MS PX: CPT | Performed by: ORTHOPAEDIC SURGERY

## 2024-02-26 PROCEDURE — 2720000010 HC SURG SUPPLY STERILE: Performed by: ORTHOPAEDIC SURGERY

## 2024-02-26 PROCEDURE — 64415 NJX AA&/STRD BRCH PLXS IMG: CPT | Performed by: ANESTHESIOLOGY

## 2024-02-26 PROCEDURE — 7100000001 HC PACU RECOVERY - ADDTL 15 MIN: Performed by: ORTHOPAEDIC SURGERY

## 2024-02-26 PROCEDURE — 2500000003 HC RX 250 WO HCPCS: Performed by: ORTHOPAEDIC SURGERY

## 2024-02-26 PROCEDURE — 2709999900 HC NON-CHARGEABLE SUPPLY: Performed by: ORTHOPAEDIC SURGERY

## 2024-02-26 PROCEDURE — 6360000002 HC RX W HCPCS: Performed by: NURSE ANESTHETIST, CERTIFIED REGISTERED

## 2024-02-26 PROCEDURE — 3700000000 HC ANESTHESIA ATTENDED CARE: Performed by: ORTHOPAEDIC SURGERY

## 2024-02-26 PROCEDURE — 6370000000 HC RX 637 (ALT 250 FOR IP): Performed by: ORTHOPAEDIC SURGERY

## 2024-02-26 DEVICE — GLENOID PLATE
Type: IMPLANTABLE DEVICE | Site: SHOULDER | Status: FUNCTIONAL
Brand: EQUINOXE

## 2024-02-26 DEVICE — HUMERAL LINER
Type: IMPLANTABLE DEVICE | Site: SHOULDER | Status: FUNCTIONAL
Brand: EQUINOXE

## 2024-02-26 DEVICE — IMPLANTABLE DEVICE
Type: IMPLANTABLE DEVICE | Site: SHOULDER | Status: FUNCTIONAL
Brand: EQUINOXE

## 2024-02-26 DEVICE — GLENOSPHERE
Type: IMPLANTABLE DEVICE | Site: SHOULDER | Status: FUNCTIONAL
Brand: EQUINOXE

## 2024-02-26 DEVICE — COMPONENT TOT SHLDR CAPPED S3EXACTECH] EXACTECH INC]: Type: IMPLANTABLE DEVICE | Status: FUNCTIONAL

## 2024-02-26 DEVICE — WIRE SMOOTH 0.62X9IN K: Type: IMPLANTABLE DEVICE | Site: SHOULDER | Status: FUNCTIONAL

## 2024-02-26 RX ORDER — LIDOCAINE HYDROCHLORIDE 20 MG/ML
INJECTION, SOLUTION EPIDURAL; INFILTRATION; INTRACAUDAL; PERINEURAL PRN
Status: DISCONTINUED | OUTPATIENT
Start: 2024-02-26 | End: 2024-02-26 | Stop reason: SDUPTHER

## 2024-02-26 RX ORDER — ONDANSETRON 2 MG/ML
4 INJECTION INTRAMUSCULAR; INTRAVENOUS
Status: DISCONTINUED | OUTPATIENT
Start: 2024-02-26 | End: 2024-02-26 | Stop reason: HOSPADM

## 2024-02-26 RX ORDER — TRANEXAMIC ACID 100 MG/ML
INJECTION, SOLUTION INTRAVENOUS PRN
Status: DISCONTINUED | OUTPATIENT
Start: 2024-02-26 | End: 2024-02-26 | Stop reason: ALTCHOICE

## 2024-02-26 RX ORDER — CYCLOBENZAPRINE HCL 10 MG
10 TABLET ORAL EVERY 12 HOURS PRN
Status: DISCONTINUED | OUTPATIENT
Start: 2024-02-26 | End: 2024-02-27 | Stop reason: HOSPADM

## 2024-02-26 RX ORDER — GLYCOPYRROLATE 0.2 MG/ML
INJECTION INTRAMUSCULAR; INTRAVENOUS PRN
Status: DISCONTINUED | OUTPATIENT
Start: 2024-02-26 | End: 2024-02-26 | Stop reason: SDUPTHER

## 2024-02-26 RX ORDER — SODIUM CHLORIDE 9 MG/ML
INJECTION, SOLUTION INTRAVENOUS PRN
Status: DISCONTINUED | OUTPATIENT
Start: 2024-02-26 | End: 2024-02-26 | Stop reason: HOSPADM

## 2024-02-26 RX ORDER — DIPHENHYDRAMINE HYDROCHLORIDE 50 MG/ML
25 INJECTION INTRAMUSCULAR; INTRAVENOUS EVERY 6 HOURS PRN
Status: DISCONTINUED | OUTPATIENT
Start: 2024-02-26 | End: 2024-02-27 | Stop reason: HOSPADM

## 2024-02-26 RX ORDER — VANCOMYCIN HYDROCHLORIDE 1 G/20ML
INJECTION, POWDER, LYOPHILIZED, FOR SOLUTION INTRAVENOUS PRN
Status: DISCONTINUED | OUTPATIENT
Start: 2024-02-26 | End: 2024-02-26 | Stop reason: ALTCHOICE

## 2024-02-26 RX ORDER — DEXAMETHASONE SODIUM PHOSPHATE 4 MG/ML
INJECTION, SOLUTION INTRA-ARTICULAR; INTRALESIONAL; INTRAMUSCULAR; INTRAVENOUS; SOFT TISSUE PRN
Status: DISCONTINUED | OUTPATIENT
Start: 2024-02-26 | End: 2024-02-26 | Stop reason: SDUPTHER

## 2024-02-26 RX ORDER — ONDANSETRON 2 MG/ML
4 INJECTION INTRAMUSCULAR; INTRAVENOUS EVERY 6 HOURS PRN
Status: DISCONTINUED | OUTPATIENT
Start: 2024-02-26 | End: 2024-02-27 | Stop reason: HOSPADM

## 2024-02-26 RX ORDER — HYDROMORPHONE HYDROCHLORIDE 2 MG/ML
0.5 INJECTION, SOLUTION INTRAMUSCULAR; INTRAVENOUS; SUBCUTANEOUS EVERY 5 MIN PRN
Status: DISCONTINUED | OUTPATIENT
Start: 2024-02-26 | End: 2024-02-26 | Stop reason: HOSPADM

## 2024-02-26 RX ORDER — ROPINIROLE 0.5 MG/1
1 TABLET, FILM COATED ORAL NIGHTLY
Status: DISCONTINUED | OUTPATIENT
Start: 2024-02-26 | End: 2024-02-27 | Stop reason: HOSPADM

## 2024-02-26 RX ORDER — EPHEDRINE SULFATE/0.9% NACL/PF 50 MG/5 ML
SYRINGE (ML) INTRAVENOUS PRN
Status: DISCONTINUED | OUTPATIENT
Start: 2024-02-26 | End: 2024-02-26 | Stop reason: SDUPTHER

## 2024-02-26 RX ORDER — SODIUM CHLORIDE 0.9 % (FLUSH) 0.9 %
5-40 SYRINGE (ML) INJECTION PRN
Status: DISCONTINUED | OUTPATIENT
Start: 2024-02-26 | End: 2024-02-27 | Stop reason: HOSPADM

## 2024-02-26 RX ORDER — SODIUM CHLORIDE, SODIUM LACTATE, POTASSIUM CHLORIDE, CALCIUM CHLORIDE 600; 310; 30; 20 MG/100ML; MG/100ML; MG/100ML; MG/100ML
INJECTION, SOLUTION INTRAVENOUS CONTINUOUS
Status: DISCONTINUED | OUTPATIENT
Start: 2024-02-26 | End: 2024-02-26

## 2024-02-26 RX ORDER — OXYCODONE AND ACETAMINOPHEN 7.5; 325 MG/1; MG/1
1 TABLET ORAL EVERY 4 HOURS PRN
Status: DISCONTINUED | OUTPATIENT
Start: 2024-02-26 | End: 2024-02-27 | Stop reason: HOSPADM

## 2024-02-26 RX ORDER — SODIUM CHLORIDE 0.9 % (FLUSH) 0.9 %
5-40 SYRINGE (ML) INJECTION EVERY 12 HOURS SCHEDULED
Status: DISCONTINUED | OUTPATIENT
Start: 2024-02-26 | End: 2024-02-27 | Stop reason: HOSPADM

## 2024-02-26 RX ORDER — SODIUM CHLORIDE, SODIUM LACTATE, POTASSIUM CHLORIDE, CALCIUM CHLORIDE 600; 310; 30; 20 MG/100ML; MG/100ML; MG/100ML; MG/100ML
INJECTION, SOLUTION INTRAVENOUS CONTINUOUS
Status: DISCONTINUED | OUTPATIENT
Start: 2024-02-26 | End: 2024-02-26 | Stop reason: HOSPADM

## 2024-02-26 RX ORDER — ROCURONIUM BROMIDE 10 MG/ML
INJECTION, SOLUTION INTRAVENOUS PRN
Status: DISCONTINUED | OUTPATIENT
Start: 2024-02-26 | End: 2024-02-26 | Stop reason: SDUPTHER

## 2024-02-26 RX ORDER — HYDROMORPHONE HYDROCHLORIDE 1 MG/ML
1 INJECTION, SOLUTION INTRAMUSCULAR; INTRAVENOUS; SUBCUTANEOUS
Status: DISCONTINUED | OUTPATIENT
Start: 2024-02-26 | End: 2024-02-27 | Stop reason: HOSPADM

## 2024-02-26 RX ORDER — NALOXONE HYDROCHLORIDE 4 MG/.1ML
1 SPRAY NASAL PRN
Qty: 1 EACH | Refills: 1 | Status: SHIPPED | OUTPATIENT
Start: 2024-02-26

## 2024-02-26 RX ORDER — SODIUM CHLORIDE 9 MG/ML
INJECTION, SOLUTION INTRAVENOUS CONTINUOUS
Status: DISCONTINUED | OUTPATIENT
Start: 2024-02-26 | End: 2024-02-26 | Stop reason: HOSPADM

## 2024-02-26 RX ORDER — ESCITALOPRAM OXALATE 10 MG/1
20 TABLET ORAL DAILY
Status: DISCONTINUED | OUTPATIENT
Start: 2024-02-26 | End: 2024-02-27 | Stop reason: HOSPADM

## 2024-02-26 RX ORDER — HYDROMORPHONE HYDROCHLORIDE 1 MG/ML
0.5 INJECTION, SOLUTION INTRAMUSCULAR; INTRAVENOUS; SUBCUTANEOUS
Status: DISCONTINUED | OUTPATIENT
Start: 2024-02-26 | End: 2024-02-27 | Stop reason: HOSPADM

## 2024-02-26 RX ORDER — LOSARTAN POTASSIUM 50 MG/1
100 TABLET ORAL DAILY
Status: DISCONTINUED | OUTPATIENT
Start: 2024-02-26 | End: 2024-02-27 | Stop reason: HOSPADM

## 2024-02-26 RX ORDER — ROPIVACAINE HYDROCHLORIDE 5 MG/ML
INJECTION, SOLUTION EPIDURAL; INFILTRATION; PERINEURAL PRN
Status: DISCONTINUED | OUTPATIENT
Start: 2024-02-26 | End: 2024-02-26 | Stop reason: ALTCHOICE

## 2024-02-26 RX ORDER — OXYCODONE AND ACETAMINOPHEN 7.5; 325 MG/1; MG/1
2 TABLET ORAL EVERY 4 HOURS PRN
Status: DISCONTINUED | OUTPATIENT
Start: 2024-02-26 | End: 2024-02-27 | Stop reason: HOSPADM

## 2024-02-26 RX ORDER — ACETAMINOPHEN 500 MG
1000 TABLET ORAL EVERY 8 HOURS PRN
Status: DISCONTINUED | OUTPATIENT
Start: 2024-02-26 | End: 2024-02-27 | Stop reason: HOSPADM

## 2024-02-26 RX ORDER — AMOXICILLIN 250 MG
1 CAPSULE ORAL DAILY
Qty: 21 TABLET | Refills: 0 | Status: SHIPPED | OUTPATIENT
Start: 2024-02-26

## 2024-02-26 RX ORDER — SODIUM CHLORIDE 0.9 % (FLUSH) 0.9 %
5-40 SYRINGE (ML) INJECTION PRN
Status: DISCONTINUED | OUTPATIENT
Start: 2024-02-26 | End: 2024-02-26 | Stop reason: HOSPADM

## 2024-02-26 RX ORDER — BISACODYL 5 MG/1
5 TABLET, DELAYED RELEASE ORAL DAILY
Status: DISCONTINUED | OUTPATIENT
Start: 2024-02-26 | End: 2024-02-27 | Stop reason: HOSPADM

## 2024-02-26 RX ORDER — SODIUM CHLORIDE 0.9 % (FLUSH) 0.9 %
5-40 SYRINGE (ML) INJECTION EVERY 12 HOURS SCHEDULED
Status: DISCONTINUED | OUTPATIENT
Start: 2024-02-26 | End: 2024-02-26 | Stop reason: HOSPADM

## 2024-02-26 RX ORDER — ONDANSETRON 8 MG/1
4 TABLET, ORALLY DISINTEGRATING ORAL EVERY 6 HOURS
Qty: 16 TABLET | Refills: 0 | Status: SHIPPED | OUTPATIENT
Start: 2024-02-26

## 2024-02-26 RX ORDER — PROPOFOL 10 MG/ML
INJECTION, EMULSION INTRAVENOUS PRN
Status: DISCONTINUED | OUTPATIENT
Start: 2024-02-26 | End: 2024-02-26 | Stop reason: SDUPTHER

## 2024-02-26 RX ORDER — ACETAMINOPHEN 500 MG
1000 TABLET ORAL ONCE
Status: COMPLETED | OUTPATIENT
Start: 2024-02-26 | End: 2024-02-26

## 2024-02-26 RX ORDER — OXYCODONE HYDROCHLORIDE 5 MG/1
5 TABLET ORAL
Status: DISCONTINUED | OUTPATIENT
Start: 2024-02-26 | End: 2024-02-26 | Stop reason: HOSPADM

## 2024-02-26 RX ORDER — NEOSTIGMINE METHYLSULFATE 1 MG/ML
INJECTION, SOLUTION INTRAVENOUS PRN
Status: DISCONTINUED | OUTPATIENT
Start: 2024-02-26 | End: 2024-02-26 | Stop reason: SDUPTHER

## 2024-02-26 RX ORDER — ONDANSETRON 2 MG/ML
INJECTION INTRAMUSCULAR; INTRAVENOUS PRN
Status: DISCONTINUED | OUTPATIENT
Start: 2024-02-26 | End: 2024-02-26 | Stop reason: SDUPTHER

## 2024-02-26 RX ORDER — MIDAZOLAM HYDROCHLORIDE 2 MG/2ML
2 INJECTION, SOLUTION INTRAMUSCULAR; INTRAVENOUS
Status: COMPLETED | OUTPATIENT
Start: 2024-02-26 | End: 2024-02-26

## 2024-02-26 RX ORDER — LIDOCAINE HYDROCHLORIDE 10 MG/ML
1 INJECTION, SOLUTION INFILTRATION; PERINEURAL
Status: DISCONTINUED | OUTPATIENT
Start: 2024-02-26 | End: 2024-02-26 | Stop reason: HOSPADM

## 2024-02-26 RX ORDER — ONDANSETRON 4 MG/1
4 TABLET, ORALLY DISINTEGRATING ORAL EVERY 8 HOURS PRN
Status: DISCONTINUED | OUTPATIENT
Start: 2024-02-26 | End: 2024-02-27 | Stop reason: HOSPADM

## 2024-02-26 RX ORDER — SODIUM CHLORIDE 9 MG/ML
INJECTION, SOLUTION INTRAVENOUS PRN
Status: DISCONTINUED | OUTPATIENT
Start: 2024-02-26 | End: 2024-02-27 | Stop reason: HOSPADM

## 2024-02-26 RX ORDER — SODIUM CHLORIDE 9 MG/ML
INJECTION, SOLUTION INTRAVENOUS CONTINUOUS
Status: DISCONTINUED | OUTPATIENT
Start: 2024-02-26 | End: 2024-02-27 | Stop reason: HOSPADM

## 2024-02-26 RX ORDER — DIPHENHYDRAMINE HCL 25 MG
25 CAPSULE ORAL EVERY 6 HOURS PRN
Status: DISCONTINUED | OUTPATIENT
Start: 2024-02-26 | End: 2024-02-27 | Stop reason: HOSPADM

## 2024-02-26 RX ORDER — PROCHLORPERAZINE EDISYLATE 5 MG/ML
5 INJECTION INTRAMUSCULAR; INTRAVENOUS
Status: DISCONTINUED | OUTPATIENT
Start: 2024-02-26 | End: 2024-02-26 | Stop reason: HOSPADM

## 2024-02-26 RX ORDER — FENTANYL CITRATE 50 UG/ML
100 INJECTION, SOLUTION INTRAMUSCULAR; INTRAVENOUS
Status: COMPLETED | OUTPATIENT
Start: 2024-02-26 | End: 2024-02-26

## 2024-02-26 RX ORDER — OXYCODONE AND ACETAMINOPHEN 7.5; 325 MG/1; MG/1
1-2 TABLET ORAL
Qty: 36 TABLET | Refills: 0 | Status: SHIPPED | OUTPATIENT
Start: 2024-02-26 | End: 2024-02-29

## 2024-02-26 RX ORDER — SENNA AND DOCUSATE SODIUM 50; 8.6 MG/1; MG/1
1 TABLET, FILM COATED ORAL 2 TIMES DAILY
Status: DISCONTINUED | OUTPATIENT
Start: 2024-02-26 | End: 2024-02-27 | Stop reason: HOSPADM

## 2024-02-26 RX ORDER — EPINEPHRINE 1 MG/ML(1)
AMPUL (ML) INJECTION PRN
Status: DISCONTINUED | OUTPATIENT
Start: 2024-02-26 | End: 2024-02-26 | Stop reason: ALTCHOICE

## 2024-02-26 RX ADMIN — SODIUM CHLORIDE, SODIUM LACTATE, POTASSIUM CHLORIDE, AND CALCIUM CHLORIDE: 600; 310; 30; 20 INJECTION, SOLUTION INTRAVENOUS at 14:45

## 2024-02-26 RX ADMIN — GLYCOPYRROLATE 0.4 MG: 0.2 INJECTION INTRAMUSCULAR; INTRAVENOUS at 16:01

## 2024-02-26 RX ADMIN — DEXAMETHASONE SODIUM PHOSPHATE 4 MG: 4 INJECTION, SOLUTION INTRAMUSCULAR; INTRAVENOUS at 15:58

## 2024-02-26 RX ADMIN — ESCITALOPRAM OXALATE 20 MG: 10 TABLET ORAL at 18:31

## 2024-02-26 RX ADMIN — OXYCODONE AND ACETAMINOPHEN 1 TABLET: 7.5; 325 TABLET ORAL at 23:59

## 2024-02-26 RX ADMIN — CEFAZOLIN SODIUM 2000 MG: 100 INJECTION, POWDER, LYOPHILIZED, FOR SOLUTION INTRAVENOUS at 21:37

## 2024-02-26 RX ADMIN — PHENYLEPHRINE HYDROCHLORIDE 100 MCG: 10 INJECTION INTRAVENOUS at 14:14

## 2024-02-26 RX ADMIN — Medication 3 MG: at 16:01

## 2024-02-26 RX ADMIN — SODIUM CHLORIDE: 9 INJECTION, SOLUTION INTRAVENOUS at 18:37

## 2024-02-26 RX ADMIN — VANCOMYCIN HYDROCHLORIDE 1000 MG: 1 INJECTION, POWDER, LYOPHILIZED, FOR SOLUTION INTRAVENOUS at 10:10

## 2024-02-26 RX ADMIN — BISACODYL 5 MG: 5 TABLET, COATED ORAL at 18:31

## 2024-02-26 RX ADMIN — SODIUM CHLORIDE, SODIUM LACTATE, POTASSIUM CHLORIDE, AND CALCIUM CHLORIDE: 600; 310; 30; 20 INJECTION, SOLUTION INTRAVENOUS at 10:10

## 2024-02-26 RX ADMIN — LOSARTAN POTASSIUM 100 MG: 50 TABLET, FILM COATED ORAL at 18:31

## 2024-02-26 RX ADMIN — ROCURONIUM BROMIDE 50 MG: 50 INJECTION, SOLUTION INTRAVENOUS at 13:46

## 2024-02-26 RX ADMIN — Medication 3 AMPULE: at 10:09

## 2024-02-26 RX ADMIN — MIDAZOLAM HYDROCHLORIDE 1 MG: 1 INJECTION, SOLUTION INTRAMUSCULAR; INTRAVENOUS at 12:36

## 2024-02-26 RX ADMIN — Medication 10 MG: at 14:42

## 2024-02-26 RX ADMIN — SODIUM CHLORIDE: 9 INJECTION, SOLUTION INTRAVENOUS at 21:45

## 2024-02-26 RX ADMIN — ONDANSETRON 4 MG: 2 INJECTION INTRAMUSCULAR; INTRAVENOUS at 15:58

## 2024-02-26 RX ADMIN — FENTANYL CITRATE 50 MCG: 0.05 INJECTION, SOLUTION INTRAMUSCULAR; INTRAVENOUS at 12:36

## 2024-02-26 RX ADMIN — LIDOCAINE HYDROCHLORIDE 60 MG: 20 INJECTION, SOLUTION EPIDURAL; INFILTRATION; INTRACAUDAL; PERINEURAL at 13:46

## 2024-02-26 RX ADMIN — SODIUM CHLORIDE, PRESERVATIVE FREE 5 ML: 5 INJECTION INTRAVENOUS at 21:37

## 2024-02-26 RX ADMIN — DOCUSATE SODIUM 50 MG AND SENNOSIDES 8.6 MG 1 TABLET: 8.6; 5 TABLET, FILM COATED ORAL at 21:36

## 2024-02-26 RX ADMIN — PHENYLEPHRINE HYDROCHLORIDE 200 MCG: 10 INJECTION INTRAVENOUS at 14:04

## 2024-02-26 RX ADMIN — PHENYLEPHRINE HYDROCHLORIDE 100 MCG: 10 INJECTION INTRAVENOUS at 14:40

## 2024-02-26 RX ADMIN — ACETAMINOPHEN 1000 MG: 500 TABLET ORAL at 10:09

## 2024-02-26 RX ADMIN — ROPINIROLE HYDROCHLORIDE 1 MG: 0.5 TABLET, FILM COATED ORAL at 21:36

## 2024-02-26 RX ADMIN — PROPOFOL 170 MG: 10 INJECTION, EMULSION INTRAVENOUS at 13:46

## 2024-02-26 RX ADMIN — ROPIVACAINE HYDROCHLORIDE 30 ML: 5 INJECTION, SOLUTION EPIDURAL; INFILTRATION; PERINEURAL at 12:42

## 2024-02-26 RX ADMIN — VANCOMYCIN HYDROCHLORIDE 1000 MG: 1 INJECTION, POWDER, LYOPHILIZED, FOR SOLUTION INTRAVENOUS at 21:46

## 2024-02-26 RX ADMIN — PHENYLEPHRINE HYDROCHLORIDE 100 MCG: 10 INJECTION INTRAVENOUS at 14:28

## 2024-02-26 RX ADMIN — ROCURONIUM BROMIDE 10 MG: 50 INJECTION, SOLUTION INTRAVENOUS at 15:16

## 2024-02-26 RX ADMIN — Medication 1000 MG: at 14:00

## 2024-02-26 RX ADMIN — Medication 10 MG: at 15:10

## 2024-02-26 RX ADMIN — Medication 2000 MG: at 14:00

## 2024-02-26 ASSESSMENT — PAIN SCALES - GENERAL
PAINLEVEL_OUTOF10: 0
PAINLEVEL_OUTOF10: 4

## 2024-02-26 ASSESSMENT — PAIN DESCRIPTION - LOCATION: LOCATION: SHOULDER

## 2024-02-26 ASSESSMENT — PAIN DESCRIPTION - ORIENTATION: ORIENTATION: RIGHT

## 2024-02-26 ASSESSMENT — PAIN - FUNCTIONAL ASSESSMENT: PAIN_FUNCTIONAL_ASSESSMENT: PREVENTS OR INTERFERES SOME ACTIVE ACTIVITIES AND ADLS

## 2024-02-26 ASSESSMENT — PAIN DESCRIPTION - PAIN TYPE: TYPE: SURGICAL PAIN

## 2024-02-26 ASSESSMENT — PAIN DESCRIPTION - DESCRIPTORS: DESCRIPTORS: TENDER

## 2024-02-26 ASSESSMENT — PAIN DESCRIPTION - ONSET: ONSET: GRADUAL

## 2024-02-26 ASSESSMENT — PAIN DESCRIPTION - FREQUENCY: FREQUENCY: INTERMITTENT

## 2024-02-26 NOTE — ANESTHESIA POSTPROCEDURE EVALUATION
Department of Anesthesiology  Postprocedure Note    Patient: Janell Rangel  MRN: [id number]  YOB: 1950  Date of evaluation: 2/26/2024    Procedure Summary       Date: 02/26/24 Room / Location: [facility] OP OR 02 / [facility]    Anesthesia Start: 1329 Anesthesia Stop: 1622    Procedure: RIGHT SHOULDER TOTAL ARTHROPLASTY REVERSE (Right: Shoulder) Diagnosis:       Arthritis of right shoulder region      Right rotator cuff tear arthropathy      (Arthritis of right shoulder region [M19.011])      (Right rotator cuff tear arthropathy [M75.101, M12.811])    Surgeons: PHOEBE Joy MD Responsible Provider: Tano Harmon MD    Anesthesia Type: general ASA Status: 2            Anesthesia Type: No value filed.    Mario Phase I: Mario Score: 7    Mario Phase II:      Anesthesia Post Evaluation    Patient location during evaluation: PACU  Patient participation: complete - patient participated  Level of consciousness: awake and alert  Airway patency: patent  Nausea & Vomiting: no nausea and no vomiting  Cardiovascular status: hemodynamically stable  Respiratory status: acceptable, nonlabored ventilation and spontaneous ventilation  Hydration status: euvolemic  Comments: BP (!) 154/89   Pulse 77   Temp 97.5 °F (36.4 °C) (Temporal)   Resp 17   Wt 73.5 kg (162 lb)   SpO2 94%   BMI 28.70 kg/m²     Multimodal analgesia pain management approach  Pain management: adequate and satisfactory to patient    No notable events documented.

## 2024-02-26 NOTE — DISCHARGE INSTRUCTIONS
Total Shoulder Replacement Postoperative Instructions    Returning Home    Care of your incisions:    You have absorbable stitches which do not need to be removed. Your incision will be checked at your first visit.  Moderate bleeding may occur at the incision sites.  This should decrease quickly over time.  Leave the dressings in place for 5-7 days.  Then dressings may be removed and replaced with fresh gauze if there is any drainage.  You may bath or shower but the incisions must be kept clean and dry.  You may take your arm out of the sling for showering or bathing but being careful to avoid use of the arm. You may let your arm also hang at your side during showering or at your side during bathing.    Watch the wound for increasing redness, tenderness, swelling and pus drainage daily.  These can be early signs of infection.  Please communicate any concerns.  A slight temperature the first few days after surgery is not uncommon.  This can be treated with deep breathing and coughing to clear the lungs.  However, fevers (anything over 101°F), increasing pain, and swelling at the incisions should be reported immediately.  Keep the wounds dry until your first follow-up visit.  It is fine to loosen the sling and do elbow straightening and bending with the arm at the side.   It is also good to move the wrist and hand.  This can be done as much as you desire, but at least three times a day. You are to wear sling at all times except when doing the exercises as above and showers.  Deep vein thrombosis (DVT) is a condition in which a blood clot has formed in a deep vein of the leg or arm. This may result in redness, swelling, warmth and/or pain of the leg/arm. Although these are very rare, there are things that can be done to lessen the risk.  It is recommended by your surgeon unless indicated otherwise, after arthroscopy; take an adult aspirin once a day for three weeks after surgery to help prevent the formation of

## 2024-02-26 NOTE — H&P
hospital problems. *      I had a long discussion with the patient regarding the natural history of the problem, as well as treatment options.     Treatment:   The patient desires a  right reverse total shoulder replacement and possible biceps subpectoral tenodesis, after they have exhausted all conservative options.  I talked with them extensively about the risks, benefits, reasonable expectations and expected recovery time as well as possible complications including but not limited to bleeding, infection, wear of the components requiring revision, neurovascular injury, instability/dislocations, cosmetic deformity, stiffness, pain, continued problems, DVT, PE, hardware failure,  fracture, heterotopic ossification, MI and other anesthesia related risks, etc.   In the office I gave them education literature and answered their questions. They seem to understand and wish to proceed.   The patient was counseled at length about the risks of jessie Covid-19 during their perioperative period and any recovery window from their procedure.  The patient was made aware that jessie Covid-19  may worsen their prognosis for recovering from their procedure and lend to a higher morbidity and/or mortality risk.  All material risks, benefits, and reasonable alternatives including postponing the procedure were discussed. The patient  wishes to proceed with the procedure at this time.        Humberto Joy MD  02/26/24

## 2024-02-26 NOTE — PLAN OF CARE
Problem: Discharge Planning  Goal: Discharge to home or other facility with appropriate resources  Outcome: Progressing  Flowsheets (Taken 2/26/2024 1130)  Discharge to home or other facility with appropriate resources: Identify barriers to discharge with patient and caregiver     Problem: ABCDS Injury Assessment  Goal: Absence of physical injury  Outcome: Progressing     Problem: Pain  Goal: Verbalizes/displays adequate comfort level or baseline comfort level  Outcome: Progressing

## 2024-02-26 NOTE — ANESTHESIA PRE PROCEDURE
Department of Anesthesiology  Preprocedure Note       Name:  Janell Rangel   Age:  74 y.o.  :  1950                                          MRN:  [id number]         Date:  2024      Surgeon: Surgeon(s):  PHOEBE Joy MD    Procedure: Procedure(s):  RIGHT SHOULDER TOTAL ARTHROPLASTY REVERSE    Medications prior to admission:   Prior to Admission medications    Medication Sig Start Date End Date Taking? Authorizing Provider   naloxegol (MOVANTIK) 25 MG TABS tablet Take 1 tablet by mouth every morning 24   Brian Grullon PA   naloxone (NARCAN) 4 MG/0.1ML LIQD nasal spray 1 spray by Nasal route as needed for Opioid Reversal 24   Brian Grullon PA   oxyCODONE-acetaminophen (PERCOCET) 7.5-325 MG per tablet Take 1-2 tablets by mouth every 4-6 hours as needed for Pain for up to 3 days. Start medication night of surgery. Max Daily Amount: 12 tablets 24  Brian Grullon PA   senna-docusate (PERICOLACE) 8.6-50 MG per tablet Take 1 tablet by mouth daily Take for constipation prophylaxis 24   Brian Grullon PA   ondansetron (ZOFRAN-ODT) 8 MG TBDP disintegrating tablet Place 0.5 tablets under the tongue every 6 hours Take 20 minutes prior to pain medication for nausea prevention 24   Brian Grullon PA   escitalopram (LEXAPRO) 20 MG tablet Take 1 tablet by mouth daily 24   Provider, MD Lang   losartan (COZAAR) 50 MG tablet Take 2 tablets by mouth daily    Automatic Reconciliation, Ar   rivaroxaban (XARELTO) 20 MG TABS tablet Take 1 tablet by mouth daily (with breakfast) 12/3/18   Automatic Reconciliation, Ar   rOPINIRole (REQUIP) 1 MG tablet Take 1 tablet by mouth nightly    Automatic Reconciliation, Ar       Current medications:    Current Facility-Administered Medications   Medication Dose Route Frequency Provider Last Rate Last Admin   • ceFAZolin (ANCEF) 2000 mg in sterile water 20 mL IV syringe  2,000 mg IntraVENous On Call to OR Brian Grullon PA       •

## 2024-02-26 NOTE — ANESTHESIA PROCEDURE NOTES
Peripheral Block    Patient location during procedure: pre-op  Reason for block: post-op pain management and at surgeon's request  Start time: 2/26/2024 12:36 PM  End time: 2/26/2024 12:42 PM  Staffing  Performed: anesthesiologist   Anesthesiologist: Tano Harmon MD  Performed by: Tano Harmon MD  Authorized by: Tano Harmon MD    Preanesthetic Checklist  Completed: patient identified, IV checked, site marked, risks and benefits discussed, surgical/procedural consents, equipment checked, pre-op evaluation, timeout performed, anesthesia consent given, oxygen available and monitors applied/VS acknowledged  Peripheral Block   Patient position: sitting  Prep: ChloraPrep  Provider prep: mask and sterile gloves  Patient monitoring: cardiac monitor, continuous pulse ox, oxygen, IV access, frequent blood pressure checks and responsive to questions  Block type: Brachial plexus  Interscalene  Laterality: right  Injection technique: single-shot  Guidance: nerve stimulator and ultrasound guided  Local infiltration: lidocaine  Infiltration strength: 1 %  Local infiltration: lidocaine  Dose: 3 mL    Needle   Needle type: insulated echogenic nerve stimulator needle   Needle gauge: 20 G  Needle localization: nerve stimulator and ultrasound guidance (minimal motor response at >0.4 mA)  Needle length: 5 cm  Assessment   Injection assessment: negative aspiration for heme, no paresthesia on injection, local visualized surrounding nerve on ultrasound and no intravascular symptoms  Slow fractionated injection: yes  Hemodynamics: stable  Outcomes: patient tolerated procedure well    Additional Notes  Risks/benefits/alternatives discussed including damage to nerve or muscle.  Needle inserted and placed in close proximity to the nerve under real time ultrasound guidance.  Ultrasound was used to visualize the spread of local anesthetic in close proximity to the nerve being blocked.  The nerve appeared anatomically normal and there were

## 2024-02-26 NOTE — ANESTHESIA PROCEDURE NOTES
Airway  Date/Time: 2/26/2024 1:48 PM  Urgency: elective    Airway not difficult    General Information and Staff    Patient location during procedure: OR  Resident/CRNA: Tano Stevenson, BLUE - CRNA  Performed: resident/CRNA/CAA   Performed by: Tano Stevenson APRN - CRNA  Authorized by: Tano Harmon MD      Indications and Patient Condition  Indications for airway management: anesthesia  Spontaneous Ventilation: absent  Sedation level: deep  Preoxygenated: yes  Patient position: sniffing  MILS maintained throughout  Mask difficulty assessment: vent by bag mask + OA or adjuvant +/- NMBA    Final Airway Details  Final airway type: endotracheal airway      Successful airway: ETT  Cuffed: yes   Successful intubation technique: direct laryngoscopy  Facilitating devices/methods: intubating stylet  Endotracheal tube insertion site: oral  Blade: Derik  Blade size: #3  ETT size (mm): 7.0  Cormack-Lehane Classification: grade I - full view of glottis  Placement verified by: chest auscultation and capnometry   Measured from: teeth  ETT to teeth (cm): 22  Number of attempts at approach: 1  Ventilation between attempts: bag mask  Number of other approaches attempted: 0    no

## 2024-02-26 NOTE — OP NOTE
placed. Excessive labrum was removed at this point from around the glenoid. The glenoid was found to have worn cartilage. The rest of the residual labrum was then removed with a 15 blade and electrocautery.  The inferior aspect of the scapula was freed up.  The glenoid at this point was able to be seen very clearly front and back.    Soft tissue resection was adequate around the glenoid and coracoid for the GPS tracking system preparation. The tracker was then placed on the coracoid and screwed into position with the two screws from the set. It was felt to have good purchase and was stable. The tracker tip was then used to sync the system. Once all points were obtained and felt to be a good representation of the anatomy the center drill was used. Using the targeting guide from the GPS system, the center hole was drilled according to the preoperative plan. The gps reamer was then used to prepare the glenoid for the preplanned implant position.      The glenoid was prepared using the GPS system for the preplanned position of the implant.  The glenoid implant was selected and bone graft from the humeral head was placed into the peg. The implant was then placed with appropriate orientation on the glenoid. The locking screw guide was then placed over the baseplate.  The locking screw holes were then drilled with the appropriate drill bit, using the GPS system. The locking screws were then placed. 2 screws were placed into the base plate with good purchase. Once the baseplate was secured the glenosphere was placed and secured with the center screw. It was screwed down into position and felt to have a very secure bite at this point.  The baseplate was then copiously irrigated.     Attention was then returned to the humeral component. The humeral component trials were placed and the shoulder reduced. The shoulder reduction was felt to be satisfactorily stable, allowing good motion, with no evidence of instability.    The

## 2024-02-27 ENCOUNTER — HOME HEALTH ADMISSION (OUTPATIENT)
Dept: HOME HEALTH SERVICES | Facility: HOME HEALTH | Age: 74
End: 2024-02-27
Payer: MEDICARE

## 2024-02-27 VITALS
SYSTOLIC BLOOD PRESSURE: 149 MMHG | WEIGHT: 160 LBS | HEART RATE: 70 BPM | TEMPERATURE: 97.7 F | RESPIRATION RATE: 16 BRPM | BODY MASS INDEX: 28.35 KG/M2 | OXYGEN SATURATION: 90 % | HEIGHT: 63 IN | DIASTOLIC BLOOD PRESSURE: 87 MMHG

## 2024-02-27 PROBLEM — Z96.611 STATUS POST REVERSE TOTAL SHOULDER REPLACEMENT, RIGHT: Status: ACTIVE | Noted: 2024-02-27

## 2024-02-27 LAB
HCT VFR BLD AUTO: 36.7 % (ref 35.8–46.3)
HGB BLD-MCNC: 11.9 G/DL (ref 11.7–15.4)

## 2024-02-27 PROCEDURE — 2500000003 HC RX 250 WO HCPCS: Performed by: PHYSICIAN ASSISTANT

## 2024-02-27 PROCEDURE — 36415 COLL VENOUS BLD VENIPUNCTURE: CPT

## 2024-02-27 PROCEDURE — 6370000000 HC RX 637 (ALT 250 FOR IP): Performed by: PHYSICIAN ASSISTANT

## 2024-02-27 PROCEDURE — 85018 HEMOGLOBIN: CPT

## 2024-02-27 PROCEDURE — 97535 SELF CARE MNGMENT TRAINING: CPT

## 2024-02-27 PROCEDURE — 97530 THERAPEUTIC ACTIVITIES: CPT

## 2024-02-27 PROCEDURE — 96374 THER/PROPH/DIAG INJ IV PUSH: CPT

## 2024-02-27 PROCEDURE — 51798 US URINE CAPACITY MEASURE: CPT

## 2024-02-27 PROCEDURE — 97165 OT EVAL LOW COMPLEX 30 MIN: CPT

## 2024-02-27 PROCEDURE — 85014 HEMATOCRIT: CPT

## 2024-02-27 PROCEDURE — 6370000000 HC RX 637 (ALT 250 FOR IP): Performed by: ANESTHESIOLOGY

## 2024-02-27 PROCEDURE — 6360000002 HC RX W HCPCS: Performed by: PHYSICIAN ASSISTANT

## 2024-02-27 PROCEDURE — 2580000003 HC RX 258: Performed by: PHYSICIAN ASSISTANT

## 2024-02-27 PROCEDURE — 97162 PT EVAL MOD COMPLEX 30 MIN: CPT

## 2024-02-27 PROCEDURE — 6370000000 HC RX 637 (ALT 250 FOR IP): Performed by: ORTHOPAEDIC SURGERY

## 2024-02-27 RX ADMIN — ACETAMINOPHEN 1000 MG: 500 TABLET ORAL at 04:16

## 2024-02-27 RX ADMIN — ESCITALOPRAM OXALATE 20 MG: 10 TABLET ORAL at 07:54

## 2024-02-27 RX ADMIN — CEFAZOLIN SODIUM 2000 MG: 100 INJECTION, POWDER, LYOPHILIZED, FOR SOLUTION INTRAVENOUS at 05:48

## 2024-02-27 RX ADMIN — LOSARTAN POTASSIUM 100 MG: 50 TABLET, FILM COATED ORAL at 07:54

## 2024-02-27 RX ADMIN — DOCUSATE SODIUM 50 MG AND SENNOSIDES 8.6 MG 1 TABLET: 8.6; 5 TABLET, FILM COATED ORAL at 07:54

## 2024-02-27 RX ADMIN — OXYCODONE AND ACETAMINOPHEN 2 TABLET: 7.5; 325 TABLET ORAL at 11:49

## 2024-02-27 RX ADMIN — HYDROMORPHONE HYDROCHLORIDE 1 MG: 1 INJECTION, SOLUTION INTRAMUSCULAR; INTRAVENOUS; SUBCUTANEOUS at 08:01

## 2024-02-27 RX ADMIN — OXYCODONE AND ACETAMINOPHEN 1 TABLET: 7.5; 325 TABLET ORAL at 05:48

## 2024-02-27 RX ADMIN — BISACODYL 5 MG: 5 TABLET, COATED ORAL at 07:55

## 2024-02-27 RX ADMIN — SODIUM CHLORIDE, PRESERVATIVE FREE 10 ML: 5 INJECTION INTRAVENOUS at 07:55

## 2024-02-27 RX ADMIN — NALOXEGOL OXALATE 25 MG: 12.5 TABLET, FILM COATED ORAL at 07:54

## 2024-02-27 ASSESSMENT — PAIN DESCRIPTION - FREQUENCY
FREQUENCY: INTERMITTENT

## 2024-02-27 ASSESSMENT — PAIN SCALES - GENERAL
PAINLEVEL_OUTOF10: 0
PAINLEVEL_OUTOF10: 7
PAINLEVEL_OUTOF10: 9
PAINLEVEL_OUTOF10: 0
PAINLEVEL_OUTOF10: 4

## 2024-02-27 ASSESSMENT — PAIN DESCRIPTION - LOCATION
LOCATION: SHOULDER;ARM
LOCATION: SHOULDER;ARM
LOCATION: SHOULDER

## 2024-02-27 ASSESSMENT — PAIN DESCRIPTION - DESCRIPTORS
DESCRIPTORS: CRUSHING
DESCRIPTORS: STABBING;THROBBING
DESCRIPTORS: ACHING

## 2024-02-27 ASSESSMENT — PAIN - FUNCTIONAL ASSESSMENT
PAIN_FUNCTIONAL_ASSESSMENT: PREVENTS OR INTERFERES SOME ACTIVE ACTIVITIES AND ADLS

## 2024-02-27 ASSESSMENT — PAIN DESCRIPTION - PAIN TYPE
TYPE: SURGICAL PAIN

## 2024-02-27 ASSESSMENT — PAIN DESCRIPTION - ORIENTATION
ORIENTATION: RIGHT
ORIENTATION: RIGHT;ANTERIOR;POSTERIOR
ORIENTATION: RIGHT;ANTERIOR;POSTERIOR

## 2024-02-27 ASSESSMENT — PAIN DESCRIPTION - ONSET
ONSET: ON-GOING
ONSET: ON-GOING

## 2024-02-27 NOTE — CARE COORDINATION
Patient to be discharged home.  PT has been arranged with [facility]. No further needs identified. CM will remain available through discharge.       02/27/24 4912   Services At/After Discharge   Transition of Care Consult (CM Consult) Home Health   Internal Home Health Yes   Services At/After Discharge Home Health   Mode of Transport at Discharge Self   Confirm Follow Up Transport Self   Condition of Participation: Discharge Planning   The Patient and/or Patient Representative was provided with a Choice of Provider? Patient   The Patient and/Or Patient Representative agree with the Discharge Plan? Yes   Freedom of Choice list was provided with basic dialogue that supports the patient's individualized plan of care/goals, treatment preferences, and shares the quality data associated with the providers?  Yes

## 2024-02-27 NOTE — PROGRESS NOTES
2024         Post Op day: 1 Day Post-Op     Admit Date: 2024  Admit Diagnosis: Arthritis of right shoulder region [M19.011]  Right rotator cuff tear arthropathy [M75.101, M12.811]  Status post surgery [Z98.890]  No surgery found  No surgery found    Subjective: Doing well, No complaints, No SOB, No Chest Pain, No Nausea or Vomitting. Did need in and out cath overnight due to urinary retention.  Is urinating at this time.    PT/OT:                             Weight Bearing Status: NWB in sling RUE  BMP:  No results for input(s): \"CREA\", \"BUN\", \"NA\", \"K\", \"CL\", \"CO2\", \"AGAP\", \"GLU\" in the last 72 hours.  Patient Vitals for the past 8 hrs:   BP Temp Temp src Pulse Resp SpO2   24 0336 132/76 97.7 °F (36.5 °C) Oral 71 18 91 %   24 2355 (!) 152/70 97.7 °F (36.5 °C) Oral 71 18 90 %     Temp (24hrs), Av.8 °F (36.6 °C), Min:97.5 °F (36.4 °C), Max:98 °F (36.7 °C)    CBC:  Recent Labs     24  0550   HGB 11.9   HCT 36.7       Microbiology:     Recent Results (from the past 72 hour(s))   TYPE AND SCREEN    Collection Time: 24  9:49 AM   Result Value Ref Range    Crossmatch expiration date 2024,2359     ABO/Rh O POSITIVE     Antibody Screen NEG    Hemoglobin and Hematocrit    Collection Time: 24  5:50 AM   Result Value Ref Range    Hemoglobin 11.9 11.7 - 15.4 g/dL    Hematocrit 36.7 35.8 - 46.3 %       Objective: Vital Signs are Stable, No Acute Distress, Alert and Oriented  Dressing is Dry,  Neurovascular exam is normal. Minimal decreased sensation to the lateral shoulder.     Assessment:  Patient Active Problem List   Diagnosis    Osteoarthritis of right knee    Bruxism, sleep-related    Status post right knee replacement    RLS (restless legs syndrome)    MARGARET (obstructive sleep apnea)    Nocturnal hypoxemia    Chronic maxillary sinusitis    DVT (deep venous thrombosis) (HCC)    HTN (hypertension)    Major depressive disorder, single episode, unspecified    Right 
Dr. Klein reviewed chart. No order received. Ok to proceed.    
I have reviewed discharge instructions with the patient.  The patient verbalized understanding.  RN removed IV without complications.  
TRANSFER - OUT REPORT:    Verbal report given to JOSH Boogie on Janell Rangel  being transferred to room 422 for routine post-op       Report consisted of patient's Situation, Background, Assessment and   Recommendations(SBAR).     Information from the following report(s) Nurse Handoff Report, Surgery Report, and Intake/Output was reviewed with the receiving nurse.           Lines:   Peripheral IV 02/26/24 Left Hand (Active)   Site Assessment Clean, dry & intact 02/26/24 1620   Line Status Infusing 02/26/24 1620   Phlebitis Assessment No symptoms 02/26/24 1620   Infiltration Assessment 0 02/26/24 1620   Alcohol Cap Used No 02/26/24 1620   Dressing Status Clean, dry & intact 02/26/24 1620   Dressing Type Transparent 02/26/24 1620        Opportunity for questions and clarification was provided.      Patient transported with:  O2 @ [facility] and Tech       
Assistance, Min=Minimal Assistance, Mod=Moderate Assistance, Max=Maximal Assistance, Total=Total Assistance, NT=Not Tested    ACTIVITIES OF DAILY LIVING: I Mod I S SBA CGA Min Mod Max Total NT Comments   BASIC ADLs:              Upper Body Bathing  [] [] [] [] [] [] [] [] [] [x]     Lower Body Bathing [] [] [] [] [] [] [] [] [] [x]     Toileting [] [] [] [] [x] [] [] [] [] [] For dynamic balance during mami care in standing    Upper Body Dressing [] [] [] [] [] [x] [x] [] [] [] [employer] gown and donning button up shirt EOB--educated on adaptive strategy   Lower Body Dressing [] [] [] [] [] [x] [] [] [] [] Donning underwear and pants sitting/standing from EOB   Feeding [] [] [] [] [] [] [] [] [] [x]    Grooming [] [] [] [] [x] [] [] [] [] [] Brushing teeth standing at sink   Personal Device Care [] [] [] [] [] [] [] [] [] [x]    Functional Mobility [] [] [] [] [x] [] [] [] [] [] SPC   I=Independent, Mod I=Modified Independent, S=Supervision/Setup, SBA=Standby Assistance, CGA=Contact Guard Assistance, Min=Minimal Assistance, Mod=Moderate Assistance, Max=Maximal Assistance, Total=Total Assistance, NT=Not Tested    PLAN:   FREQUENCY/DURATION   OT Plan of Care: 5 times/week for duration of hospital stay or until stated goals are met, whichever comes first.    PROBLEM LIST:   (Skilled intervention is medically necessary to address:)  Decreased ADL/Functional Activities  Decreased Activity Tolerance  Decreased AROM/PROM  Decreased Balance  Decreased Coordination  Decreased Gait Ability  Decreased Safety Awareness  Decreased Strength  Decreased Transfer Abilities  Increased Pain   INTERVENTIONS PLANNED:  (Benefits and precautions of occupational therapy have been discussed with the patient.)  Self Care Training  Therapeutic Activity  Therapeutic Exercise/HEP  Neuromuscular Re-education  Manual Therapy  Education         TREATMENT:     EVALUATION: LOW COMPLEXITY: (Untimed Charge)    TREATMENT:   Self Care (25 minutes):

## 2024-02-27 NOTE — DISCHARGE SUMMARY
DC Summary:    Patient ID:  Janell Rangel  [id number]   74 y.o.  1950    Admit date: 2/26/2024    Discharge Date: 2/27/2024      Admitting Physician: PHOEBE Joy MD     Discharge Physician: BING Page    Admission Diagnoses: Arthritis of right shoulder region [M19.011]  Right rotator cuff tear arthropathy [M75.101, M12.811]  Status post surgery [Z98.890]    Last Procedure: Procedure(s):  RIGHT SHOULDER TOTAL ARTHROPLASTY REVERSE    Discharge Diagnoses: Principal Problem:    Right rotator cuff tear arthropathy  Active Problems:    Arthritis of right shoulder region    Status post surgery  Resolved Problems:    * No resolved hospital problems. *       Consults: none    Significant Diagnostic Studies: hgb 11.9     Hospital Course:   Normal hospital course for this procedure.    Disposition: Home    Patient Instructions:   Current Discharge Medication List        CONTINUE these medications which have NOT CHANGED    Details   naloxegol (MOVANTIK) 25 MG TABS tablet Take 1 tablet by mouth every morning  Qty: 7 tablet, Refills: 0      naloxone (NARCAN) 4 MG/0.1ML LIQD nasal spray 1 spray by Nasal route as needed for Opioid Reversal  Qty: 1 each, Refills: 1      oxyCODONE-acetaminophen (PERCOCET) 7.5-325 MG per tablet Take 1-2 tablets by mouth every 4-6 hours as needed for Pain for up to 3 days. Start medication night of surgery. Max Daily Amount: 12 tablets  Qty: 36 tablet, Refills: 0    Comments: Supervising physician: Humberto Joy   RAFAEL: [id number] Reduce doses taken as pain becomes manageable  Associated Diagnoses: Arthritis of right shoulder region      senna-docusate (PERICOLACE) 8.6-50 MG per tablet Take 1 tablet by mouth daily Take for constipation prophylaxis  Qty: 21 tablet, Refills: 0      ondansetron (ZOFRAN-ODT) 8 MG TBDP disintegrating tablet Place 0.5 tablets under the tongue every 6 hours Take 20 minutes prior to pain medication for nausea prevention  Qty: 16 tablet, Refills: 0

## 2024-02-27 NOTE — THERAPY EVALUATION
or more falls in the past year or any fall with injury in the past year?: No  Receives Help From: Family  ADL Assistance: Independent  Homemaking Assistance: Independent  Ambulation Assistance: Independent  Transfer Assistance: Independent    OBJECTIVE:     PAIN: VITALS / O2: PRECAUTION / LINES / DRAINS:   Pre Treatment: 2/10         Post Treatment: 2/10 Vitals        Oxygen      None    RESTRICTIONS/PRECAUTIONS:  Restrictions/Precautions: Fall Risk  Required Braces or Orthoses?: Yes   Reverse Total Shoulder Protocol        Right Upper Extremity Brace/Splint: Sling     GROSS EVALUATION: BLE Intact Impaired (Comments):   AROM []   Slightly limited RLE knee (hx RLE TKA)   PROM [x]    Strength []   Generalized weakness, but functional   Balance []   Good sitting, Fair standing. Benefits from use of SPC   Posture [] N/A   Sensation [x]     Coordination [x]      Tone []     Edema []    Activity Tolerance [x]      []      COGNITION/  PERCEPTION: Intact Impaired (Comments):   Orientation [x]     Vision [x]     Hearing [x]     Cognition  []   Decreased insight. Cues to keep eyes open.     MOBILITY: I Mod I S SBA CGA Min Mod Max Total  NT x2 Comments:   Bed Mobility    Rolling [] [] [] [] [x] [] [] [] [] [] []    Supine to Sit [] [] [] [] [x] [] [] [] [] [] []    Scooting [] [] [] [] [x] [] [] [] [] [] []    Sit to Supine [] [] [] [] [] [] [] [] [] [] []    Transfers    Sit to Stand [] [] [] [] [x] [] [] [] [] [] []    Bed to Chair [] [] [] [] [x] [] [] [] [] [] []    Stand to Sit [] [] [] [] [x] [] [] [] [] [] []     [] [] [] [] [] [] [] [] [] [] []    I=Independent, Mod I=Modified Independent, S=Supervision, SBA=Standby Assistance, CGA=Contact Guard Assistance,   Min=Minimal Assistance, Mod=Moderate Assistance, Max=Maximal Assistance, Total=Total Assistance, NT=Not Tested    GAIT: I Mod I S SBA CGA Min Mod Max Total  NT x2 Comments:   Level of Assistance [] [] [] [] [x] [] [] [] [] [] []    Distance 2x15 + 240 feet    DME

## 2024-02-27 NOTE — PLAN OF CARE
Problem: Discharge Planning  Goal: Discharge to home or other facility with appropriate resources  2/26/2024 1953 by Talia Suarez RN  Outcome: Progressing  Flowsheets (Taken 2/26/2024 1923)  Discharge to home or other facility with appropriate resources: Identify barriers to discharge with patient and caregiver  2/26/2024 1824 by Annemarie Anne RN  Outcome: Progressing  Flowsheets (Taken 2/26/2024 1757)  Discharge to home or other facility with appropriate resources: Identify barriers to discharge with patient and caregiver     Problem: ABCDS Injury Assessment  Goal: Absence of physical injury  2/26/2024 1953 by Talia Suarez RN  Outcome: Progressing  2/26/2024 1824 by Annemarie Anne RN  Outcome: Progressing     Problem: Pain  Goal: Verbalizes/displays adequate comfort level or baseline comfort level  2/26/2024 1953 by Talia Suarez RN  Outcome: Progressing  2/26/2024 1824 by Annemarie Anne RN  Outcome: Progressing     Problem: Safety - Adult  Goal: Free from fall injury  Outcome: Progressing

## 2024-02-27 NOTE — PLAN OF CARE
Problem: Discharge Planning  Goal: Discharge to home or other facility with appropriate resources  2/27/2024 1103 by Annie Birch RN  Outcome: Completed  2/27/2024 0729 by Annie Birch RN  Outcome: Progressing     Problem: ABCDS Injury Assessment  Goal: Absence of physical injury  2/27/2024 1103 by Annie Birch RN  Outcome: Completed  2/27/2024 0729 by Annie Birch RN  Outcome: Progressing     Problem: Pain  Goal: Verbalizes/displays adequate comfort level or baseline comfort level  2/27/2024 1103 by Annie Birch RN  Outcome: Completed  Flowsheets (Taken 2/27/2024 0801)  Verbalizes/displays adequate comfort level or baseline comfort level: Encourage patient to monitor pain and request assistance  2/27/2024 0729 by Annie Birch RN  Outcome: Progressing  Flowsheets (Taken 2/27/2024 0729)  Verbalizes/displays adequate comfort level or baseline comfort level: Encourage patient to monitor pain and request assistance     Problem: Safety - Adult  Goal: Free from fall injury  2/27/2024 1103 by Annie Birch RN  Outcome: Completed  2/27/2024 0729 by Annie Birch RN  Outcome: Progressing

## 2024-02-27 NOTE — PLAN OF CARE
Problem: Discharge Planning  Goal: Discharge to home or other facility with appropriate resources  2/27/2024 0729 by Annie Birch RN  Outcome: Progressing  2/26/2024 1953 by Talia Suarez RN  Outcome: Progressing  Flowsheets (Taken 2/26/2024 1923)  Discharge to home or other facility with appropriate resources: Identify barriers to discharge with patient and caregiver  2/26/2024 1824 by Annemarie Anne RN  Outcome: Progressing  Flowsheets (Taken 2/26/2024 1757)  Discharge to home or other facility with appropriate resources: Identify barriers to discharge with patient and caregiver     Problem: ABCDS Injury Assessment  Goal: Absence of physical injury  2/27/2024 0729 by Annie Birch RN  Outcome: Progressing  2/26/2024 1953 by Talia Suarez RN  Outcome: Progressing  2/26/2024 1824 by Annemarie Anne RN  Outcome: Progressing     Problem: Pain  Goal: Verbalizes/displays adequate comfort level or baseline comfort level  2/27/2024 0729 by Annie Birch RN  Outcome: Progressing  Flowsheets (Taken 2/27/2024 0729)  Verbalizes/displays adequate comfort level or baseline comfort level: Encourage patient to monitor pain and request assistance  2/26/2024 1953 by Talia Suarez RN  Outcome: Progressing  2/26/2024 1824 by Annemarie Anne RN  Outcome: Progressing     Problem: Safety - Adult  Goal: Free from fall injury  2/27/2024 0729 by Annie Birch RN  Outcome: Progressing  2/26/2024 1953 by Talia Suarez RN  Outcome: Progressing

## 2024-02-27 NOTE — CARE COORDINATION
CM into see pt.  at bedside. Reviewed demographics. Lives with spouse, was independent at baseline.  PT discussed with pt. Pt has no preference with providers and is agreeable to using [facility]. Referral placed. No further needs identified. CM will follow through discharge.     02/27/24 0943   Service Assessment   Patient Orientation Alert and Oriented   Cognition Alert   History Provided By Patient   Primary Caregiver Self   Support Systems Spouse/Significant Other   PCP Verified by CM Yes   Prior Functional Level Independent in ADLs/IADLs   Current Functional Level Assistance with the following:;Bathing;Dressing   Can patient return to prior living arrangement Yes   Ability to make needs known: Good   Family able to assist with home care needs: Yes   Would you like for me to discuss the discharge plan with any other family members/significant others, and if so, who? No

## 2024-02-28 ENCOUNTER — HOME CARE VISIT (OUTPATIENT)
Dept: SCHEDULING | Facility: HOME HEALTH | Age: 74
End: 2024-02-28

## 2024-02-28 VITALS
OXYGEN SATURATION: 98 % | DIASTOLIC BLOOD PRESSURE: 68 MMHG | TEMPERATURE: 98 F | SYSTOLIC BLOOD PRESSURE: 118 MMHG | HEART RATE: 78 BPM | RESPIRATION RATE: 18 BRPM

## 2024-02-28 PROCEDURE — 0221000100 HH NO PAY CLAIM PROCEDURE

## 2024-02-28 PROCEDURE — G0151 HHCP-SERV OF PT,EA 15 MIN: HCPCS

## 2024-02-28 ASSESSMENT — ENCOUNTER SYMPTOMS: PAIN LOCATION - PAIN QUALITY: ACHE, SHARP

## 2024-03-04 ENCOUNTER — HOME CARE VISIT (OUTPATIENT)
Dept: SCHEDULING | Facility: HOME HEALTH | Age: 74
End: 2024-03-04
Payer: MEDICARE

## 2024-03-04 VITALS
DIASTOLIC BLOOD PRESSURE: 70 MMHG | TEMPERATURE: 98 F | HEART RATE: 78 BPM | SYSTOLIC BLOOD PRESSURE: 112 MMHG | RESPIRATION RATE: 18 BRPM | OXYGEN SATURATION: 98 %

## 2024-03-04 PROCEDURE — G0151 HHCP-SERV OF PT,EA 15 MIN: HCPCS

## 2024-03-04 ASSESSMENT — ENCOUNTER SYMPTOMS: PAIN LOCATION - PAIN QUALITY: ACHE

## 2024-03-05 ENCOUNTER — HOME CARE VISIT (OUTPATIENT)
Dept: SCHEDULING | Facility: HOME HEALTH | Age: 74
End: 2024-03-05
Payer: MEDICARE

## 2024-03-05 VITALS
TEMPERATURE: 98.8 F | HEART RATE: 57 BPM | DIASTOLIC BLOOD PRESSURE: 80 MMHG | RESPIRATION RATE: 18 BRPM | OXYGEN SATURATION: 98 % | SYSTOLIC BLOOD PRESSURE: 138 MMHG

## 2024-03-05 PROCEDURE — G0152 HHCP-SERV OF OT,EA 15 MIN: HCPCS

## 2024-03-05 ASSESSMENT — ENCOUNTER SYMPTOMS
PAIN LOCATION - PAIN QUALITY: STABBING
DOUBLE VISION: 0

## 2024-03-06 ENCOUNTER — HOME CARE VISIT (OUTPATIENT)
Dept: SCHEDULING | Facility: HOME HEALTH | Age: 74
End: 2024-03-06
Payer: MEDICARE

## 2024-03-06 VITALS
OXYGEN SATURATION: 96 % | HEART RATE: 72 BPM | TEMPERATURE: 98.1 F | SYSTOLIC BLOOD PRESSURE: 126 MMHG | DIASTOLIC BLOOD PRESSURE: 74 MMHG | RESPIRATION RATE: 16 BRPM

## 2024-03-06 PROCEDURE — G0158 HHC OT ASSISTANT EA 15: HCPCS

## 2024-03-06 ASSESSMENT — ENCOUNTER SYMPTOMS: PAIN LOCATION - PAIN QUALITY: ACHE

## 2024-03-07 ENCOUNTER — HOME CARE VISIT (OUTPATIENT)
Dept: SCHEDULING | Facility: HOME HEALTH | Age: 74
End: 2024-03-07
Payer: MEDICARE

## 2024-03-07 VITALS
TEMPERATURE: 98 F | RESPIRATION RATE: 17 BRPM | DIASTOLIC BLOOD PRESSURE: 72 MMHG | SYSTOLIC BLOOD PRESSURE: 112 MMHG | HEART RATE: 76 BPM | OXYGEN SATURATION: 98 %

## 2024-03-07 PROCEDURE — G0157 HHC PT ASSISTANT EA 15: HCPCS

## 2024-03-07 ASSESSMENT — ENCOUNTER SYMPTOMS: PAIN LOCATION - PAIN QUALITY: SORE, ACHY

## 2024-03-11 ENCOUNTER — HOME CARE VISIT (OUTPATIENT)
Dept: HOME HEALTH SERVICES | Facility: HOME HEALTH | Age: 74
End: 2024-03-11
Payer: MEDICARE

## 2024-03-11 ENCOUNTER — HOME CARE VISIT (OUTPATIENT)
Dept: SCHEDULING | Facility: HOME HEALTH | Age: 74
End: 2024-03-11
Payer: MEDICARE

## 2024-03-11 VITALS
RESPIRATION RATE: 18 BRPM | DIASTOLIC BLOOD PRESSURE: 70 MMHG | TEMPERATURE: 97.6 F | SYSTOLIC BLOOD PRESSURE: 114 MMHG | HEART RATE: 64 BPM | OXYGEN SATURATION: 99 %

## 2024-03-11 VITALS
DIASTOLIC BLOOD PRESSURE: 80 MMHG | HEART RATE: 60 BPM | OXYGEN SATURATION: 95 % | SYSTOLIC BLOOD PRESSURE: 128 MMHG | RESPIRATION RATE: 16 BRPM | TEMPERATURE: 98.1 F

## 2024-03-11 PROCEDURE — G0152 HHCP-SERV OF OT,EA 15 MIN: HCPCS

## 2024-03-11 PROCEDURE — G0157 HHC PT ASSISTANT EA 15: HCPCS

## 2024-03-11 ASSESSMENT — ENCOUNTER SYMPTOMS: PAIN LOCATION - PAIN QUALITY: SORENESS

## 2024-03-12 ENCOUNTER — OFFICE VISIT (OUTPATIENT)
Dept: ORTHOPEDIC SURGERY | Age: 74
End: 2024-03-12

## 2024-03-12 DIAGNOSIS — Z09 SURGERY FOLLOW-UP: ICD-10-CM

## 2024-03-12 DIAGNOSIS — M19.011 ARTHRITIS OF RIGHT SHOULDER REGION: Primary | ICD-10-CM

## 2024-03-12 DIAGNOSIS — Z96.611 S/P REVERSE TOTAL SHOULDER ARTHROPLASTY, RIGHT: ICD-10-CM

## 2024-03-12 PROCEDURE — 99024 POSTOP FOLLOW-UP VISIT: CPT | Performed by: ORTHOPAEDIC SURGERY

## 2024-03-12 NOTE — PROGRESS NOTES
Name: Janell Rangel  YOB: 1950  Gender: female  MRN: [id number]    CC:   Chief Complaint   Patient presents with    Follow-up     1st PO right reverse TSA DOS 2-26-24   , Patient is here to follow-up one week status post TSA.  Right Shoulder Total Arthroplasty Reverse - Right  2/26/2024    HPI: The patient is doing well and as expected. The patient has been following protocol and comes into the office today with use of the sling.  No real pain.  Has had some home health    Review of Systems  Noncontributory    PE surgical shoulder: Operative shoulder exam:  The incisions are clean, dry and intact. There is no sign of infection. The axillary sensation is intact. The deltoid muscle has good firing. The shoulder is supple. They are neurovascularly intact. Range of Motion was not tested today.    X-ray:  AP and Axillary of the right shoulder views show intact prosthesis and the components in place.  No fractures are evident.    AP:     ICD-10-CM    1. Arthritis of right shoulder region  M19.011 XR SHOULDER RIGHT (MIN 2 VIEWS)     Ambulatory referral to Physical Therapy      2. S/P reverse total shoulder arthroplasty, right  Z96.611 XR SHOULDER RIGHT (MIN 2 VIEWS)     Ambulatory referral to Physical Therapy      3. Surgery follow-up  Z09 XR SHOULDER RIGHT (MIN 2 VIEWS)     Ambulatory referral to Physical Therapy        The patient is doing well one week status post TSA.   We will start therapy at the next visit.  Right now she will just do pendulums, elbow motion, table slides and pulleys.  Calcium and vitamin D supplement.    Return in about 3 weeks (around 4/2/2024) for TSA x-ray fu Grashey / Axillary. They need to return to the clinic in 4 weeks time for re-evaluation and repeat xrays.     Humberto Joy MD  03/12/24

## 2024-03-13 ENCOUNTER — HOME CARE VISIT (OUTPATIENT)
Dept: SCHEDULING | Facility: HOME HEALTH | Age: 74
End: 2024-03-13
Payer: MEDICARE

## 2024-03-13 VITALS
OXYGEN SATURATION: 98 % | TEMPERATURE: 98 F | RESPIRATION RATE: 18 BRPM | DIASTOLIC BLOOD PRESSURE: 84 MMHG | SYSTOLIC BLOOD PRESSURE: 128 MMHG | HEART RATE: 78 BPM

## 2024-03-13 PROCEDURE — G0151 HHCP-SERV OF PT,EA 15 MIN: HCPCS

## 2024-03-13 ASSESSMENT — ENCOUNTER SYMPTOMS: PAIN LOCATION - PAIN QUALITY: ACHE

## 2024-03-20 RX ORDER — TRIAMCINOLONE ACETONIDE 1 MG/G
OINTMENT TOPICAL
COMMUNITY
Start: 2019-11-04

## 2024-03-20 RX ORDER — VIT B12/INTRINSIC FACT/FOLATE 500-20-800
TABLET ORAL
COMMUNITY

## 2024-03-20 RX ORDER — ROSUVASTATIN CALCIUM 10 MG/1
TABLET, COATED ORAL
COMMUNITY
Start: 2022-10-24

## 2024-03-20 RX ORDER — SACCHAROMYCES BOULARDII 250 MG
CAPSULE ORAL
COMMUNITY

## 2024-03-27 NOTE — PROGRESS NOTES
Name: Janell Rangel  YOB: 1950  Gender: female  MRN: [id number]    CC:   Chief Complaint   Patient presents with    Follow-up     S/P right reverse TSA DOS 2/26/24   The patient comes in today 4 weeks status post TSA.  Right Shoulder Total Arthroplasty Reverse - Right  2/26/2024    HPI: The patient is doing well today. Their pain has decreased. They are attending physical therapy and are following the protocol. They feel as if they are progressing as expected. They deny use of narcotic pain medications.    Review of Systems  Noncontributory    PE surgical shoulder : Their wounds are clean, dry, and intact and there is no sign of infection. They are neurovascularly intact. Their deltoid is firing well.   Their Passive Shoulder Range of Motion is:  Forward elevation: 90  Abduction: 80-85  External Rotation: 15    X-ray: Grashey and axillary lateral views of the operativeright shoulder were obtained and reviewed today in the office. There has been no change in the hardware's alignment and the glenohumeral position is appropriate on all the films.    AP:     ICD-10-CM    1. Arthritis of right shoulder region  M19.011 XR SHOULDER RIGHT (MIN 2 VIEWS)      2. S/P reverse total shoulder arthroplasty, right  Z96.611 XR SHOULDER RIGHT (MIN 2 VIEWS)      3. Surgery follow-up  Z09 XR SHOULDER RIGHT (MIN 2 VIEWS)        The patient is doing well status post the above mentioned procedure.   They need to continue with Physical Therapy per the protocol.  She is going to [facility]  They will follow-up with us in clinic in 4-6 weeks for repeat evaluation.      Return in about 6 weeks (around 5/14/2024).     Humberto Joy MD  04/02/24

## 2024-04-02 ENCOUNTER — OFFICE VISIT (OUTPATIENT)
Dept: ORTHOPEDIC SURGERY | Age: 74
End: 2024-04-02

## 2024-04-02 DIAGNOSIS — Z09 SURGERY FOLLOW-UP: ICD-10-CM

## 2024-04-02 DIAGNOSIS — M19.011 ARTHRITIS OF RIGHT SHOULDER REGION: Primary | ICD-10-CM

## 2024-04-02 DIAGNOSIS — Z96.611 S/P REVERSE TOTAL SHOULDER ARTHROPLASTY, RIGHT: ICD-10-CM

## 2024-04-02 PROCEDURE — 99024 POSTOP FOLLOW-UP VISIT: CPT | Performed by: ORTHOPAEDIC SURGERY

## 2024-04-10 ENCOUNTER — APPOINTMENT (RX ONLY)
Dept: URBAN - METROPOLITAN AREA CLINIC 330 | Facility: CLINIC | Age: 74
Setting detail: DERMATOLOGY
End: 2024-04-10

## 2024-04-10 DIAGNOSIS — L82.1 OTHER SEBORRHEIC KERATOSIS: ICD-10-CM

## 2024-04-10 DIAGNOSIS — I78.8 OTHER DISEASES OF CAPILLARIES: ICD-10-CM

## 2024-04-10 DIAGNOSIS — D22 MELANOCYTIC NEVI: ICD-10-CM

## 2024-04-10 DIAGNOSIS — L82.0 INFLAMED SEBORRHEIC KERATOSIS: ICD-10-CM

## 2024-04-10 DIAGNOSIS — L81.4 OTHER MELANIN HYPERPIGMENTATION: ICD-10-CM

## 2024-04-10 DIAGNOSIS — D18.0 HEMANGIOMA: ICD-10-CM

## 2024-04-10 PROBLEM — D22.5 MELANOCYTIC NEVI OF TRUNK: Status: ACTIVE | Noted: 2024-04-10

## 2024-04-10 PROBLEM — D18.01 HEMANGIOMA OF SKIN AND SUBCUTANEOUS TISSUE: Status: ACTIVE | Noted: 2024-04-10

## 2024-04-10 PROCEDURE — ? ELECTRODESICCATION

## 2024-04-10 PROCEDURE — ? LIQUID NITROGEN

## 2024-04-10 PROCEDURE — 17110 DESTRUCTION B9 LES UP TO 14: CPT

## 2024-04-10 PROCEDURE — ? COUNSELING

## 2024-04-10 PROCEDURE — 99213 OFFICE O/P EST LOW 20 MIN: CPT | Mod: 25

## 2024-04-10 PROCEDURE — ? TREATMENT REGIMEN

## 2024-04-10 ASSESSMENT — LOCATION DETAILED DESCRIPTION DERM
LOCATION DETAILED: LEFT INFERIOR MEDIAL MALAR CHEEK
LOCATION DETAILED: LEFT PROXIMAL PRETIBIAL REGION
LOCATION DETAILED: LEFT PROXIMAL DORSAL FOREARM
LOCATION DETAILED: LEFT DISTAL DORSAL FOREARM
LOCATION DETAILED: RIGHT MEDIAL FOREHEAD
LOCATION DETAILED: RIGHT CENTRAL MALAR CHEEK
LOCATION DETAILED: RIGHT DISTAL DORSAL FOREARM
LOCATION DETAILED: LEFT LATERAL FOREHEAD
LOCATION DETAILED: RIGHT PROXIMAL DORSAL FOREARM
LOCATION DETAILED: NASAL DORSUM
LOCATION DETAILED: LEFT MID-UPPER BACK
LOCATION DETAILED: RIGHT DORSAL FOOT
LOCATION DETAILED: RIGHT MEDIAL MALAR CHEEK
LOCATION DETAILED: LEFT INFERIOR CENTRAL MALAR CHEEK
LOCATION DETAILED: RIGHT NASAL SIDEWALL
LOCATION DETAILED: LEFT CENTRAL TEMPLE
LOCATION DETAILED: RIGHT INFERIOR CENTRAL MALAR CHEEK
LOCATION DETAILED: INFERIOR THORACIC SPINE

## 2024-04-10 ASSESSMENT — LOCATION SIMPLE DESCRIPTION DERM
LOCATION SIMPLE: LEFT FOREHEAD
LOCATION SIMPLE: LEFT UPPER BACK
LOCATION SIMPLE: RIGHT NOSE
LOCATION SIMPLE: LEFT FOREARM
LOCATION SIMPLE: LEFT CHEEK
LOCATION SIMPLE: UPPER BACK
LOCATION SIMPLE: RIGHT FOREHEAD
LOCATION SIMPLE: RIGHT FOOT
LOCATION SIMPLE: RIGHT CHEEK
LOCATION SIMPLE: LEFT TEMPLE
LOCATION SIMPLE: NOSE
LOCATION SIMPLE: LEFT PRETIBIAL REGION
LOCATION SIMPLE: RIGHT FOREARM

## 2024-04-10 ASSESSMENT — LOCATION ZONE DERM
LOCATION ZONE: NOSE
LOCATION ZONE: FACE
LOCATION ZONE: FEET
LOCATION ZONE: ARM
LOCATION ZONE: TRUNK
LOCATION ZONE: LEG

## 2024-04-10 NOTE — PROCEDURE: ELECTRODESICCATION
Medical Necessity Information: It is in your best interest to select a reason for this procedure from the list below. All of these items fulfill various CMS LCD requirements except the new and changing color options.
Post-Care Instructions: I reviewed with the patient in detail post-care instructions. Patient is to wear sunprotection, and avoid picking at any of the treated lesions. Pt may apply Vaseline to crusted or scabbing areas
Detail Level: Simple
Medical Necessity Clause: This procedure was medically necessary because the lesions that were treated were:
Consent: The patient's consent was obtained including but not limited to risks of crusting, scabbing, blistering, scarring, darker or lighter pigmentary change, recurrence, incomplete removal and infection.
Include Z78.9 (Other Specified Conditions Influencing Health Status) As An Associated Diagnosis?: No
Anesthesia Type: 1% lidocaine with epinephrine

## 2024-04-10 NOTE — PROCEDURE: MIPS QUALITY
Quality 402: Tobacco Use And Help With Quitting Among Adolescents: Patient screened for tobacco and never smoked
Quality 47: Advance Care Plan: Advance care planning not documented, reason not otherwise specified.
Detail Level: Detailed
Quality 226: Preventive Care And Screening: Tobacco Use: Screening And Cessation Intervention: Patient screened for tobacco use and is an ex/non-smoker
Quality 431: Preventive Care And Screening: Unhealthy Alcohol Use - Screening: Patient not identified as an unhealthy alcohol user when screened for unhealthy alcohol use using a systematic screening method
Quality 110: Preventive Care And Screening: Influenza Immunization: Influenza Immunization Administered during Influenza season
Quality 130: Documentation Of Current Medications In The Medical Record: Current Medications Documented

## 2024-04-10 NOTE — HPI: EVALUATION OF SKIN LESION(S)
What Type Of Note Output Would You Prefer (Optional)?: Bullet Format
Hpi Title: Evaluation of Skin Lesions
How Severe Are Your Spot(S)?: mild
Have Your Spot(S) Been Treated In The Past?: has not been treated
Additional History: Pt complains of spot on face.

## 2024-04-10 NOTE — PROCEDURE: LIQUID NITROGEN
Include Z78.9 (Other Specified Conditions Influencing Health Status) As An Associated Diagnosis?: No
Consent: The patient's consent was obtained including but not limited to risks of crusting, scabbing, blistering, scarring, darker or lighter pigmentary change, recurrence, incomplete removal and infection.
Show Applicator Variable?: Yes
Detail Level: Detailed
Spray Paint Text: The liquid nitrogen was applied to the skin utilizing a spray paint frosting technique.
Medical Necessity Information: It is in your best interest to select a reason for this procedure from the list below. All of these items fulfill various CMS LCD requirements except the new and changing color options.
Post-Care Instructions: I reviewed with the patient in detail post-care instructions. Patient is to wear sunprotection, and avoid picking at any of the treated lesions. Pt may apply Vaseline to crusted or scabbing areas.
Medical Necessity Clause: This procedure was medically necessary because the lesions that were treated were:

## 2024-05-10 RX ORDER — ROPINIROLE 0.5 MG/1
TABLET, FILM COATED ORAL
COMMUNITY
Start: 2024-03-28

## 2024-05-10 RX ORDER — ALENDRONATE SODIUM 70 MG/1
TABLET ORAL
COMMUNITY
Start: 2024-03-15

## 2024-05-14 ENCOUNTER — OFFICE VISIT (OUTPATIENT)
Dept: ORTHOPEDIC SURGERY | Age: 74
End: 2024-05-14

## 2024-05-14 DIAGNOSIS — Z09 SURGERY FOLLOW-UP: ICD-10-CM

## 2024-05-14 DIAGNOSIS — M19.011 ARTHRITIS OF RIGHT SHOULDER REGION: ICD-10-CM

## 2024-05-14 DIAGNOSIS — Z96.611 S/P REVERSE TOTAL SHOULDER ARTHROPLASTY, RIGHT: Primary | ICD-10-CM

## 2024-05-14 PROCEDURE — 99024 POSTOP FOLLOW-UP VISIT: CPT | Performed by: ORTHOPAEDIC SURGERY

## 2024-05-14 NOTE — PROGRESS NOTES
active motion still struggles against gravity  The swelling is minimal. They are neurovascularly intact.    A/P:     ICD-10-CM    1. S/P reverse total shoulder arthroplasty, right  Z96.611       2. Arthritis of right shoulder region  M19.011       3. Surgery follow-up  Z09         The patient is doing well status post the above mentioned procedure.   They need to continue with Physical Therapy.   If they have any questions or concerns the patient knows that they can call our office at any time.  We discussed using her pulleys at home to perform multiple negative reps.  Meaning she uses her normal arm on the left to help her raise the shoulder but then slowly fight her shoulder wanting to drop down using her other arm to help slowly allow this to lower down.  I think if we can do some negative reps we can build strength and get her active motion to return over time.  This will take some time as she has not moved her shoulder in this way in quite a while.  Plan at follow-up will be to obtain x-rays of the left shoulder and work that up as well.    Return for as scheduled in 6 weeks.     Humberto Joy MD  05/14/24

## 2024-05-24 ENCOUNTER — HOSPITAL ENCOUNTER (OUTPATIENT)
Dept: MAMMOGRAPHY | Age: 74
End: 2024-05-24
Attending: INTERNAL MEDICINE
Payer: MEDICARE

## 2024-05-24 DIAGNOSIS — Z12.31 SCREENING MAMMOGRAM FOR HIGH-RISK PATIENT: ICD-10-CM

## 2024-05-24 PROCEDURE — 77063 BREAST TOMOSYNTHESIS BI: CPT

## 2024-06-24 RX ORDER — AZITHROMYCIN 250 MG/1
TABLET, FILM COATED ORAL
COMMUNITY
Start: 2024-04-16

## 2024-06-25 ENCOUNTER — OFFICE VISIT (OUTPATIENT)
Dept: ORTHOPEDIC SURGERY | Age: 74
End: 2024-06-25
Payer: MEDICARE

## 2024-06-25 DIAGNOSIS — M25.512 LEFT SHOULDER PAIN, UNSPECIFIED CHRONICITY: ICD-10-CM

## 2024-06-25 DIAGNOSIS — M19.011 ARTHRITIS OF RIGHT SHOULDER REGION: Primary | ICD-10-CM

## 2024-06-25 DIAGNOSIS — Z09 SURGERY FOLLOW-UP: ICD-10-CM

## 2024-06-25 DIAGNOSIS — Z96.611 S/P REVERSE TOTAL SHOULDER ARTHROPLASTY, RIGHT: ICD-10-CM

## 2024-06-25 PROCEDURE — 1123F ACP DISCUSS/DSCN MKR DOCD: CPT | Performed by: ORTHOPAEDIC SURGERY

## 2024-06-25 PROCEDURE — G8427 DOCREV CUR MEDS BY ELIG CLIN: HCPCS | Performed by: ORTHOPAEDIC SURGERY

## 2024-06-25 PROCEDURE — 1036F TOBACCO NON-USER: CPT | Performed by: ORTHOPAEDIC SURGERY

## 2024-06-25 PROCEDURE — 3017F COLORECTAL CA SCREEN DOC REV: CPT | Performed by: ORTHOPAEDIC SURGERY

## 2024-06-25 PROCEDURE — 1090F PRES/ABSN URINE INCON ASSESS: CPT | Performed by: ORTHOPAEDIC SURGERY

## 2024-06-25 PROCEDURE — 99213 OFFICE O/P EST LOW 20 MIN: CPT | Performed by: ORTHOPAEDIC SURGERY

## 2024-06-25 PROCEDURE — G8419 CALC BMI OUT NRM PARAM NOF/U: HCPCS | Performed by: ORTHOPAEDIC SURGERY

## 2024-06-25 PROCEDURE — G8399 PT W/DXA RESULTS DOCUMENT: HCPCS | Performed by: ORTHOPAEDIC SURGERY

## 2024-06-25 NOTE — PROGRESS NOTES
I had a long discussion with the patient regarding the natural history of the problem, as well as treatment options.     Treatment:   Discussed strengthening exercises for the shoulder.  We reviewed supine tabletop passive and active assist range of motion.  I think it has been so long since her right shoulder has worked that the muscles do not quite know how to sequence appropriately to get active flexion.  Discussed the importance of just performing the movement and not wearing about the result at this time.    Patient is of increased medical complexity and increased risk for surgical intervention secondary to HTN, history of DVT, sleep apnea      Return in about 2 months (around 8/25/2024).    Thank you for the opportunity to see and take care of your patients. If you ever have any questions please give me a call.   Sincerely,  Humberto Joy MD  06/25/24

## 2024-08-27 ENCOUNTER — OFFICE VISIT (OUTPATIENT)
Dept: ORTHOPEDIC SURGERY | Age: 74
End: 2024-08-27
Payer: MEDICARE

## 2024-08-27 DIAGNOSIS — M19.011 ARTHRITIS OF RIGHT SHOULDER REGION: Primary | ICD-10-CM

## 2024-08-27 DIAGNOSIS — Z96.611 S/P REVERSE TOTAL SHOULDER ARTHROPLASTY, RIGHT: ICD-10-CM

## 2024-08-27 DIAGNOSIS — Z09 SURGERY FOLLOW-UP: ICD-10-CM

## 2024-08-27 DIAGNOSIS — R20.2 RIGHT LEG PARESTHESIAS: ICD-10-CM

## 2024-08-27 PROCEDURE — 3017F COLORECTAL CA SCREEN DOC REV: CPT | Performed by: ORTHOPAEDIC SURGERY

## 2024-08-27 PROCEDURE — 1090F PRES/ABSN URINE INCON ASSESS: CPT | Performed by: ORTHOPAEDIC SURGERY

## 2024-08-27 PROCEDURE — 99213 OFFICE O/P EST LOW 20 MIN: CPT | Performed by: ORTHOPAEDIC SURGERY

## 2024-08-27 PROCEDURE — G8399 PT W/DXA RESULTS DOCUMENT: HCPCS | Performed by: ORTHOPAEDIC SURGERY

## 2024-08-27 PROCEDURE — 1123F ACP DISCUSS/DSCN MKR DOCD: CPT | Performed by: ORTHOPAEDIC SURGERY

## 2024-08-27 PROCEDURE — 1036F TOBACCO NON-USER: CPT | Performed by: ORTHOPAEDIC SURGERY

## 2024-08-27 PROCEDURE — G8428 CUR MEDS NOT DOCUMENT: HCPCS | Performed by: ORTHOPAEDIC SURGERY

## 2024-08-27 PROCEDURE — G8419 CALC BMI OUT NRM PARAM NOF/U: HCPCS | Performed by: ORTHOPAEDIC SURGERY

## 2024-08-27 NOTE — PROGRESS NOTES
Name: Janell Rangel  YOB: 1950  Gender: female  MRN: [id number]    CC:   Chief Complaint   Patient presents with    Follow-up     S/P right reverse TSA DOS 2/26/24   , The patient follows up 6 months status post TSA.  Right Shoulder Total Arthroplasty Reverse - Right  2/26/2024    HPI: The patient is having trouble getting her active motion back.  Also is complaining of some chronic numbness and tingling down her right leg after her history of knee replacement.    No Known Allergies  Past Medical History:   Diagnosis Date    Arthritis     osteo    Environmental allergies     Hx of blood clots     blood clots in each leg previously    Hypertension     controlled with med    Migraine     Neuropathy     feet/ legs    Psychiatric disorder     pt takes med for anxiety, depression    Right shoulder pain     RLS (restless legs syndrome)     Rosacea     Sleep apnea     wears a cpap    Thromboembolus (HCC) 12/03/2019    DVT-- RIGHT KNEE- s/p replaced--per pt-- \" still there\"-- followed by dr knowles     Past Surgical History:   Procedure Laterality Date    APPENDECTOMY      DILATION AND CURETTAGE OF UTERUS      ORTHOPEDIC SURGERY Right 09/2018    knee replaced    [place] UNLISTED PROCEDURE CARDIAC SURGERY  2014     cardiac cath    SHOULDER SURGERY Right 2/26/2024    RIGHT SHOULDER TOTAL ARTHROPLASTY REVERSE performed by PHOEBE Joy MD at [facility]     Family History   Problem Relation Age of Onset    Heart Disease Father     Diabetes Father     Lung Disease Father     Breast Cancer Paternal Aunt 75     Social History     Socioeconomic History    Marital status:      Spouse name: Not on file    Number of children: Not on file    Years of education: Not on file    Highest education level: Not on file   Occupational History    Not on file   Tobacco Use    Smoking status: Never    Smokeless tobacco: Never   Vaping Use    Vaping status: Never Used   Substance and Sexual Activity    Alcohol use: Yes      no concerns

## 2024-09-10 ENCOUNTER — EVALUATION (OUTPATIENT)
Age: 74
End: 2024-09-10

## 2024-09-10 DIAGNOSIS — M19.011 ARTHRITIS OF RIGHT SHOULDER REGION: ICD-10-CM

## 2024-09-10 DIAGNOSIS — M25.611 SHOULDER STIFFNESS, RIGHT: ICD-10-CM

## 2024-09-10 DIAGNOSIS — M62.81 MUSCLE WEAKNESS: Primary | ICD-10-CM

## 2024-09-10 DIAGNOSIS — Z96.611 S/P REVERSE TOTAL SHOULDER ARTHROPLASTY, RIGHT: ICD-10-CM

## 2024-09-10 DIAGNOSIS — Z09 SURGERY FOLLOW-UP: ICD-10-CM

## 2024-09-13 ENCOUNTER — TREATMENT (OUTPATIENT)
Age: 74
End: 2024-09-13

## 2024-09-13 DIAGNOSIS — M25.611 SHOULDER STIFFNESS, RIGHT: ICD-10-CM

## 2024-09-13 DIAGNOSIS — M62.81 MUSCLE WEAKNESS: Primary | ICD-10-CM

## 2024-09-13 DIAGNOSIS — Z96.611 S/P REVERSE TOTAL SHOULDER ARTHROPLASTY, RIGHT: ICD-10-CM

## 2024-09-18 ENCOUNTER — TREATMENT (OUTPATIENT)
Age: 74
End: 2024-09-18
Payer: MEDICARE

## 2024-09-18 DIAGNOSIS — M25.611 SHOULDER STIFFNESS, RIGHT: ICD-10-CM

## 2024-09-18 DIAGNOSIS — M62.81 MUSCLE WEAKNESS: Primary | ICD-10-CM

## 2024-09-18 PROCEDURE — 97140 MANUAL THERAPY 1/> REGIONS: CPT | Performed by: PHYSICAL THERAPIST

## 2024-09-18 PROCEDURE — 97110 THERAPEUTIC EXERCISES: CPT | Performed by: PHYSICAL THERAPIST

## 2024-09-20 ENCOUNTER — TREATMENT (OUTPATIENT)
Age: 74
End: 2024-09-20
Payer: MEDICARE

## 2024-09-20 DIAGNOSIS — M25.611 SHOULDER STIFFNESS, RIGHT: ICD-10-CM

## 2024-09-20 DIAGNOSIS — M62.81 MUSCLE WEAKNESS: Primary | ICD-10-CM

## 2024-09-20 PROCEDURE — 97110 THERAPEUTIC EXERCISES: CPT | Performed by: PHYSICAL THERAPIST

## 2024-09-20 PROCEDURE — 97140 MANUAL THERAPY 1/> REGIONS: CPT | Performed by: PHYSICAL THERAPIST

## 2024-09-24 ENCOUNTER — TREATMENT (OUTPATIENT)
Age: 74
End: 2024-09-24
Payer: MEDICARE

## 2024-09-24 DIAGNOSIS — M25.611 SHOULDER STIFFNESS, RIGHT: ICD-10-CM

## 2024-09-24 DIAGNOSIS — M62.81 MUSCLE WEAKNESS: Primary | ICD-10-CM

## 2024-09-24 PROCEDURE — 97110 THERAPEUTIC EXERCISES: CPT | Performed by: PHYSICAL THERAPIST

## 2024-09-24 PROCEDURE — 97140 MANUAL THERAPY 1/> REGIONS: CPT | Performed by: PHYSICAL THERAPIST

## 2024-10-01 ENCOUNTER — TREATMENT (OUTPATIENT)
Age: 74
End: 2024-10-01
Payer: MEDICARE

## 2024-10-01 DIAGNOSIS — M62.81 MUSCLE WEAKNESS: Primary | ICD-10-CM

## 2024-10-01 DIAGNOSIS — M25.611 SHOULDER STIFFNESS, RIGHT: ICD-10-CM

## 2024-10-01 PROCEDURE — 97110 THERAPEUTIC EXERCISES: CPT | Performed by: PHYSICAL THERAPIST

## 2024-10-01 NOTE — PROGRESS NOTES
[facility] PT [facility]  [address] [place] [id number]  Dept: [phone]      Physical Therapy Daily Note     Referring MD: PHOEBE Joy MD  Diagnosis:     ICD-10-CM    1. Muscle weakness  M62.81       2. Shoulder stiffness, right  M25.611          Surgery: Right Shoulder Total Arthroplasty Reverse - Right  Date: 2/26/2024  Therapy precautions: R reverse TSA Feb 2024  Co-morbidities affecting plan of care: none  Payor: Payor: MEDICARE /  /  /  Billing pattern: Government- total time    Timed Procedure Codes min: 40, Total Time: 40  min   Modifier needed: KX needed  Visit count: 6     Chief complaints/history of injury:    Date symptoms began: 2/26/24  Nature of condition: Chronic (continuous duration > 3 months)  Primary cause of current episode: Post-surgical  How did symptoms start: Pt reports limited ability to lift her arm after a R reverse TSA in Feb 2024.  She reports pain is well controlled, but her main concern is weakness.  She is R hand dominant.  She has difficulty w/ reaching into fridge and lifting items as well as holding w/ general weakness    Patient Stated Goals: improve strength and motion of R arm for grooming/dressing    SUBJECTIVE     Pt states she does feel that her arm is reaching a little higher but she still feels weak and is unable to complete grooming    Medications: no changes since last session      OBJECTIVE      Functional Outcome Questionnaire: QuickDASH:  40.9/100= 41% functional deficit        Palpation/joint mobility: guarded/hypomobility of R scapula  TTP in medial scap border, lat, and subscap    A/PROM Measures:  9/18/24  Shoulder Right Left Comment   Flexion 70     Abduction 78     IR at 45° abd R greater trochanter     ER at 45° abd 35     ER at 0° abd 30     Elbow WFL WFL      Strength/MMT (0-5 Scale):     Strength/MMT (0-5) 9/10/2024    Right 9/10/2024   Left Comments:   Shoulder       Flexion 2+     Scaption 2+     IR  3     ER 2+

## 2024-10-04 ENCOUNTER — TREATMENT (OUTPATIENT)
Age: 74
End: 2024-10-04
Payer: MEDICARE

## 2024-10-04 DIAGNOSIS — M25.611 SHOULDER STIFFNESS, RIGHT: ICD-10-CM

## 2024-10-04 DIAGNOSIS — M62.81 MUSCLE WEAKNESS: Primary | ICD-10-CM

## 2024-10-04 PROCEDURE — 97140 MANUAL THERAPY 1/> REGIONS: CPT | Performed by: PHYSICAL THERAPIST

## 2024-10-04 PROCEDURE — 97110 THERAPEUTIC EXERCISES: CPT | Performed by: PHYSICAL THERAPIST

## 2024-10-04 NOTE — PROGRESS NOTES
[facility] PT INT [facility]  [address] [place] [id number]  Dept: [phone]      Physical Therapy Daily Note     Referring MD: PHOEBE Joy MD  Diagnosis:     ICD-10-CM    1. Muscle weakness  M62.81       2. Shoulder stiffness, right  M25.611          Surgery: Right Shoulder Total Arthroplasty Reverse - Right  Date: 2/26/2024  Therapy precautions: R reverse TSA Feb 2024  Co-morbidities affecting plan of care: none  Payor: Payor: MEDICARE /  /  /  Billing pattern: Government- total time    Timed Procedure Codes min: 40, Total Time: 40  min   Modifier needed: KX needed  Visit count: 7     Chief complaints/history of injury:    Date symptoms began: 2/26/24  Nature of condition: Chronic (continuous duration > 3 months)  Primary cause of current episode: Post-surgical  How did symptoms start: Pt reports limited ability to lift her arm after a R reverse TSA in Feb 2024.  She reports pain is well controlled, but her main concern is weakness.  She is R hand dominant.  She has difficulty w/ reaching into fridge and lifting items as well as holding w/ general weakness    Patient Stated Goals: improve strength and motion of R arm for grooming/dressing    SUBJECTIVE     Pt has no c/o today    Medications: no changes since last session      OBJECTIVE      Functional Outcome Questionnaire: QuickDASH:  40.9/100= 41% functional deficit        Palpation/joint mobility: guarded/hypomobility of R scapula  TTP in medial scap border, lat, and subscap    A/PROM Measures:  9/18/24  Shoulder Right Left Comment   Flexion 70     Abduction 78     IR at 45° abd R greater trochanter     ER at 45° abd 35     ER at 0° abd 30     Elbow WFL WFL      Strength/MMT (0-5 Scale):     Strength/MMT (0-5) 9/10/2024    Right 9/10/2024   Left Comments:   Shoulder       Flexion 2+     Scaption 2+     IR  3     ER 2+     Extension 3     Lower trapezius      Middle trapezius      Posterior deltoid      Elbow      Flexion 4

## 2024-10-08 ENCOUNTER — OFFICE VISIT (OUTPATIENT)
Dept: ORTHOPEDIC SURGERY | Age: 74
End: 2024-10-08
Payer: MEDICARE

## 2024-10-08 DIAGNOSIS — Z96.611 S/P REVERSE TOTAL SHOULDER ARTHROPLASTY, RIGHT: ICD-10-CM

## 2024-10-08 DIAGNOSIS — M19.011 ARTHRITIS OF RIGHT SHOULDER REGION: Primary | ICD-10-CM

## 2024-10-08 DIAGNOSIS — G57.30 ENTRAPMENT OF COMMON PERONEAL NERVE: ICD-10-CM

## 2024-10-08 PROCEDURE — 1036F TOBACCO NON-USER: CPT | Performed by: ORTHOPAEDIC SURGERY

## 2024-10-08 PROCEDURE — G8428 CUR MEDS NOT DOCUMENT: HCPCS | Performed by: ORTHOPAEDIC SURGERY

## 2024-10-08 PROCEDURE — G8484 FLU IMMUNIZE NO ADMIN: HCPCS | Performed by: ORTHOPAEDIC SURGERY

## 2024-10-08 PROCEDURE — G8399 PT W/DXA RESULTS DOCUMENT: HCPCS | Performed by: ORTHOPAEDIC SURGERY

## 2024-10-08 PROCEDURE — 1090F PRES/ABSN URINE INCON ASSESS: CPT | Performed by: ORTHOPAEDIC SURGERY

## 2024-10-08 PROCEDURE — G8419 CALC BMI OUT NRM PARAM NOF/U: HCPCS | Performed by: ORTHOPAEDIC SURGERY

## 2024-10-08 PROCEDURE — 99213 OFFICE O/P EST LOW 20 MIN: CPT | Performed by: ORTHOPAEDIC SURGERY

## 2024-10-08 PROCEDURE — 3017F COLORECTAL CA SCREEN DOC REV: CPT | Performed by: ORTHOPAEDIC SURGERY

## 2024-10-08 PROCEDURE — 1123F ACP DISCUSS/DSCN MKR DOCD: CPT | Performed by: ORTHOPAEDIC SURGERY

## 2024-10-08 NOTE — PROGRESS NOTES
Name: Janell Rangel  YOB: 1950  Gender: female  MRN: [id number]    CC:   Chief Complaint   Patient presents with    Follow-up     Recheck s/p R Reverse TSA DOS 2/26/24   , The patient follows up 8 months status post TSA.  Right Shoulder Total Arthroplasty Reverse - Right  2/26/2024    HPI: The patient is doing well.  We did start her with Sofya secondary to some of her motion related issues.  Also nerve conduction study EMG ordered.  She is sore along her clavicle.  No recent falls or injuries.  Per Sofya's note she is starting to make a little more progress with scapular activation and sequencing but it is slow.  Does require some constant reminders on activities and exercises.    No Known Allergies  Past Medical History:   Diagnosis Date    Arthritis     osteo    Environmental allergies     Hx of blood clots     blood clots in each leg previously    Hypertension     controlled with med    Migraine     Neuropathy     feet/ legs    Psychiatric disorder     pt takes med for anxiety, depression    Right shoulder pain     RLS (restless legs syndrome)     Rosacea     Sleep apnea     wears a cpap    Thromboembolus (HCC) 12/03/2019    DVT-- RIGHT KNEE- s/p replaced--per pt-- \" still there\"-- followed by dr knowles     Past Surgical History:   Procedure Laterality Date    APPENDECTOMY      DILATION AND CURETTAGE OF UTERUS      ORTHOPEDIC SURGERY Right 09/2018    knee replaced    [place] UNLISTED PROCEDURE CARDIAC SURGERY  2014     cardiac cath    SHOULDER SURGERY Right 2/26/2024    RIGHT SHOULDER TOTAL ARTHROPLASTY REVERSE performed by PHOEBE Joy MD at [facility]     Family History   Problem Relation Age of Onset    Heart Disease Father     Diabetes Father     Lung Disease Father     Breast Cancer Paternal Aunt 75     Social History     Socioeconomic History    Marital status:      Spouse name: Not on file    Number of children: Not on file    Years of education: Not on file    Highest education

## 2024-10-29 ENCOUNTER — TREATMENT (OUTPATIENT)
Age: 74
End: 2024-10-29
Payer: MEDICARE

## 2024-10-29 DIAGNOSIS — M25.611 SHOULDER STIFFNESS, RIGHT: ICD-10-CM

## 2024-10-29 DIAGNOSIS — M62.81 MUSCLE WEAKNESS: Primary | ICD-10-CM

## 2024-10-29 PROCEDURE — 97140 MANUAL THERAPY 1/> REGIONS: CPT | Performed by: PHYSICAL THERAPIST

## 2024-10-29 PROCEDURE — 97110 THERAPEUTIC EXERCISES: CPT | Performed by: PHYSICAL THERAPIST

## 2024-10-29 NOTE — PROGRESS NOTES
[facility] PT INT [facility]  [address] [place] [id number]  Dept: [phone]      Physical Therapy Daily Note     Referring MD: PHOEBE Joy MD  Diagnosis:     ICD-10-CM    1. Muscle weakness  M62.81       2. Shoulder stiffness, right  M25.611          Surgery: Right Shoulder Total Arthroplasty Reverse - Right  Date: 2/26/2024  Therapy precautions: R reverse TSA Feb 2024  Co-morbidities affecting plan of care: none  Payor: Payor: MEDICARE /  /  /  Billing pattern: Government- total time    Timed Procedure Codes min: 40, Total Time: 40  min   Modifier needed: KX needed  Visit count: 8     Chief complaints/history of injury:    Date symptoms began: 2/26/24  Nature of condition: Chronic (continuous duration > 3 months)  Primary cause of current episode: Post-surgical  How did symptoms start: Pt reports limited ability to lift her arm after a R reverse TSA in Feb 2024.  She reports pain is well controlled, but her main concern is weakness.  She is R hand dominant.  She has difficulty w/ reaching into fridge and lifting items as well as holding w/ general weakness    Patient Stated Goals: improve strength and motion of R arm for grooming/dressing    SUBJECTIVE     Pt reports she feels that she can raise her arm higher.  She is unable to perform daily tasks such as grooming d/t her arm weakness.    Medications: no changes since last session      OBJECTIVE      Functional Outcome Questionnaire: QuickDASH:  40.9/100= 41% functional deficit        Palpation/joint mobility: guarded/hypomobility of R scapula  TTP in medial scap border, lat, and subscap    A/PROM Measures:  10/30/24  Shoulder Right Left Comment   Flexion 83     Abduction 80     IR at 45° abd R greater trochanter     ER at 45° abd 40     ER at 0° abd 30     Elbow WFL WFL      Strength/MMT (0-5 Scale):     Strength/MMT (0-5) 10/30/2024    Right 9/10/2024   Left Comments:   Shoulder       Flexion 2+     Scaption 2+     IR  3     ER 2+

## 2024-10-31 NOTE — PROGRESS NOTES
[facility] PT [facility]  [address] [place] [phone]  Dept: [phone]      Physical Therapy Daily Note     Referring MD: PHOEBE Joy MD  Diagnosis:     ICD-10-CM    1. Muscle weakness  M62.81       2. Shoulder stiffness, right  M25.611            Surgery: Right Shoulder Total Arthroplasty Reverse - Right  Date: 2/26/2024  Therapy precautions: R reverse TSA Feb 2024  Co-morbidities affecting plan of care: none  Payor: Payor: MEDICARE /  /  /  Billing pattern: Government- total time    Timed Procedure Codes min: 45 Total Time: 45  min   Modifier needed: KX needed  Visit count: 9     Chief complaints/history of injury:    Date symptoms began: 2/26/24  Nature of condition: Chronic (continuous duration > 3 months)  Primary cause of current episode: Post-surgical  How did symptoms start: Pt reports limited ability to lift her arm after a R reverse TSA in Feb 2024.  She reports pain is well controlled, but her main concern is weakness.  She is R hand dominant.  She has difficulty w/ reaching into fridge and lifting items as well as holding w/ general weakness    Patient Stated Goals: improve strength and motion of R arm for grooming/dressing    SUBJECTIVE     Pt primary complaint of difficulty with overhead reaching due to weakness, such as donning overhead clothing, and styling hair or reaching into overhead cabinetry.  Pt reports episodes of mild soreness, however, says her shoulder \"doesn't really hurt\".      Medications: no changes since last session      OBJECTIVE      Functional Outcome Questionnaire: QuickDASH:  40.9/100= 41% functional deficit        Palpation/joint mobility: guarded/hypomobility of R scapula  TTP in medial scap border, lat, and subscap    A/PROM Measures:  10/30/24  Shoulder Right Left Comment   Flexion 83     Abduction 80     IR at 45° abd R greater trochanter     ER at 45° abd 40     ER at 0° abd 30     Elbow WFL WFL      Strength/MMT (0-5 Scale):

## 2024-11-01 ENCOUNTER — TREATMENT (OUTPATIENT)
Age: 74
End: 2024-11-01

## 2024-11-01 DIAGNOSIS — M62.81 MUSCLE WEAKNESS: Primary | ICD-10-CM

## 2024-11-01 DIAGNOSIS — M25.611 SHOULDER STIFFNESS, RIGHT: ICD-10-CM

## 2024-11-05 ENCOUNTER — TREATMENT (OUTPATIENT)
Age: 74
End: 2024-11-05
Payer: MEDICARE

## 2024-11-05 DIAGNOSIS — M25.611 SHOULDER STIFFNESS, RIGHT: ICD-10-CM

## 2024-11-05 DIAGNOSIS — M62.81 MUSCLE WEAKNESS: Primary | ICD-10-CM

## 2024-11-05 PROCEDURE — 97110 THERAPEUTIC EXERCISES: CPT | Performed by: PHYSICAL THERAPIST

## 2024-11-05 NOTE — PROGRESS NOTES
[facility] PT [facility]  [address] [place] [phone]  Dept: [phone]      Physical Therapy Daily Note     Referring MD: PHOEBE Joy MD  Diagnosis:     ICD-10-CM    1. Muscle weakness  M62.81       2. Shoulder stiffness, right  M25.611            Surgery: Right Shoulder Total Arthroplasty Reverse - Right  Date: 2/26/2024  Therapy precautions: R reverse TSA Feb 2024  Co-morbidities affecting plan of care: none  Payor: Payor: MEDICARE /  /  /  Billing pattern: Government- total time    Timed Procedure Codes min: 45 Total Time: 45  min   Modifier needed: KX needed  Visit count: 10     Chief complaints/history of injury:    Date symptoms began: 2/26/24  Nature of condition: Chronic (continuous duration > 3 months)  Primary cause of current episode: Post-surgical  How did symptoms start: Pt reports limited ability to lift her arm after a R reverse TSA in Feb 2024.  She reports pain is well controlled, but her main concern is weakness.  She is R hand dominant.  She has difficulty w/ reaching into fridge and lifting items as well as holding w/ general weakness    Patient Stated Goals: improve strength and motion of R arm for grooming/dressing    SUBJECTIVE     Pt reports she can lift her arm higher however still is limited w/ what she can do w/ her arm.      Medications: no changes since last session      OBJECTIVE      Functional Outcome Questionnaire: QuickDASH:  40.9/100= 41% functional deficit        Palpation/joint mobility: guarded/hypomobility of R scapula  TTP in medial scap border, lat, and subscap    A/PROM Measures:  10/30/24  Shoulder Right Left Comment   Flexion 83     Abduction 80     IR at 45° abd R greater trochanter     ER at 45° abd 40     ER at 0° abd 30     Elbow WFL WFL      Strength/MMT (0-5 Scale):     Strength/MMT (0-5) 10/30/2024    Right 9/10/2024   Left Comments:   Shoulder       Flexion 2+     Scaption 2+     IR  3     ER 2+     Extension 3+     Lower

## 2024-11-08 ENCOUNTER — TREATMENT (OUTPATIENT)
Age: 74
End: 2024-11-08

## 2024-11-08 DIAGNOSIS — M62.81 MUSCLE WEAKNESS: Primary | ICD-10-CM

## 2024-11-08 DIAGNOSIS — M25.611 SHOULDER STIFFNESS, RIGHT: ICD-10-CM

## 2024-11-08 NOTE — PROGRESS NOTES
11/9/2024 (60 days):  Demonstrate AROM of involved shoulder to >/= Flexion: 100, Abduction: 90; IR behind back and ER to 55 for improved ability to perform grooming tasks.   Improve QuickDASH Score to </=  25% impairment, demonstrating improved overall function.  Pt will be able to Ind complete grooming of her hair    [employer]   Access Code: [email or web address]  URL: [email or web address]  Date: 09/10/2024  Prepared by: Sofya Yo    Exercises  - Prone Scapular Retraction and Row  - 1 x daily - 2 sets - 10 reps - 10s hold  - Supine Shoulder External Rotation with Dowel at 20 Degrees of Abduction  - 1 x daily - 6 reps - 10s hold

## 2024-11-12 ENCOUNTER — TREATMENT (OUTPATIENT)
Age: 74
End: 2024-11-12

## 2024-11-12 DIAGNOSIS — M25.611 SHOULDER STIFFNESS, RIGHT: ICD-10-CM

## 2024-11-12 DIAGNOSIS — M62.81 MUSCLE WEAKNESS: Primary | ICD-10-CM

## 2024-11-12 NOTE — PROGRESS NOTES
[facility] PT [facility]  [address] [place] [phone]  Dept: [phone]      Physical Therapy Daily Note     Referring MD: PHOEBE Joy MD  Diagnosis:     ICD-10-CM    1. Muscle weakness  M62.81       2. Shoulder stiffness, right  M25.611            Surgery: Right Shoulder Total Arthroplasty Reverse - Right  Date: 2/26/2024  Therapy precautions: R reverse TSA Feb 2024  Co-morbidities affecting plan of care: none  Payor: Payor: MEDICARE /  /  /  Billing pattern: Government- total time    Timed Procedure Codes min: 30 Total Time: 30  min   Modifier needed: KX needed  Visit count: 12     Chief complaints/history of injury:    Date symptoms began: 2/26/24  Nature of condition: Chronic (continuous duration > 3 months)  Primary cause of current episode: Post-surgical  How did symptoms start: Pt reports limited ability to lift her arm after a R reverse TSA in Feb 2024.  She reports pain is well controlled, but her main concern is weakness.  She is R hand dominant.  She has difficulty w/ reaching into fridge and lifting items as well as holding w/ general weakness    Patient Stated Goals: improve strength and motion of R arm for grooming/dressing    SUBJECTIVE     Pt reports some intermittent soreness still in shoulder but overall feeling some improvement    Medications: no changes since last session      OBJECTIVE      Functional Outcome Questionnaire: QuickDASH:  40.9/100= 41% functional deficit        Palpation/joint mobility: guarded/hypomobility of R scapula  TTP in medial scap border, lat, and subscap    A/PROM Measures:  10/30/24  Shoulder Right Left Comment   Flexion 83     Abduction 80     IR at 45° abd R greater trochanter     ER at 45° abd 40     ER at 0° abd 30     Elbow WFL WFL      Strength/MMT (0-5 Scale):     Strength/MMT (0-5) 10/30/2024    Right 9/10/2024   Left Comments:   Shoulder       Flexion 2+     Scaption 2+     IR  3     ER 2+     Extension 3+     Lower trapezius

## 2024-11-15 ENCOUNTER — TREATMENT (OUTPATIENT)
Age: 74
End: 2024-11-15
Payer: MEDICARE

## 2024-11-15 DIAGNOSIS — M25.611 SHOULDER STIFFNESS, RIGHT: ICD-10-CM

## 2024-11-15 DIAGNOSIS — M62.81 MUSCLE WEAKNESS: Primary | ICD-10-CM

## 2024-11-15 PROCEDURE — 97110 THERAPEUTIC EXERCISES: CPT | Performed by: PHYSICAL THERAPIST

## 2024-11-15 NOTE — PROGRESS NOTES
greater ease. Goal Not Met 11/15/2024 - Continue  Pt will demonstrate Ind w/ HEP. Goal Met 11/8/2024    Long term goals to be met by 11/9/2024 (60 days):  Demonstrate AROM of involved shoulder to >/= Flexion: 100, Abduction: 90; IR behind back and ER to 55 for improved ability to perform grooming tasks. Goal Not Met 11/15/2024 - Continue  Improve QuickDASH Score to </=  25% impairment, demonstrating improved overall function. Goal Not Met 11/15/2024 - Continue  Pt will be able to Ind complete grooming of her hair Goal Not Met 11/15/2024 - Continue    [employer]   Access Code: [email or web address]  URL: [email or web address]  Date: 11/15/2024  Prepared by: Sofya Yo    Exercises  - Supine Shoulder External Rotation with Dowel at 20 Degrees of Abduction  - 4 x weekly - 6 reps - 10s hold  - Sidelying Shoulder Abduction  - 4 x weekly - 2 sets - 10 reps - 5s hold  - Sidelying Shoulder Horizontal Abduction  - 4 x weekly - 2 sets - 10 reps - 5s hold  - Standing Row with Anchored Resistance  - 1 x daily - 4 x weekly - 3 sets - 10 reps  - Standing Bent Over Single Arm Scapular Row with Table Support  - 4 x weekly - 3 sets - 10 reps  - Shoulder Flexion Wall Slide with Towel  - 4 x weekly - 3 sets - 10 reps  - Push-Up on Counter  - 4 x weekly - 3 sets - 10 reps  - Sidelying Shoulder Flexion 15 Degrees  - 4 x weekly - 3 sets - 10 reps

## 2024-12-05 RX ORDER — POTASSIUM CHLORIDE 750 MG/1
10 TABLET, EXTENDED RELEASE ORAL
COMMUNITY

## 2024-12-05 RX ORDER — AMOXICILLIN 500 MG/1
CAPSULE ORAL
COMMUNITY
Start: 2024-11-05

## 2024-12-10 ENCOUNTER — OFFICE VISIT (OUTPATIENT)
Dept: ORTHOPEDIC SURGERY | Age: 74
End: 2024-12-10
Payer: MEDICARE

## 2024-12-10 DIAGNOSIS — M19.011 ARTHRITIS OF RIGHT SHOULDER REGION: Primary | ICD-10-CM

## 2024-12-10 DIAGNOSIS — Z96.611 S/P REVERSE TOTAL SHOULDER ARTHROPLASTY, RIGHT: ICD-10-CM

## 2024-12-10 DIAGNOSIS — G57.30 ENTRAPMENT OF COMMON PERONEAL NERVE: ICD-10-CM

## 2024-12-10 PROCEDURE — 1159F MED LIST DOCD IN RCRD: CPT | Performed by: ORTHOPAEDIC SURGERY

## 2024-12-10 PROCEDURE — 1090F PRES/ABSN URINE INCON ASSESS: CPT | Performed by: ORTHOPAEDIC SURGERY

## 2024-12-10 PROCEDURE — 1123F ACP DISCUSS/DSCN MKR DOCD: CPT | Performed by: ORTHOPAEDIC SURGERY

## 2024-12-10 PROCEDURE — G8419 CALC BMI OUT NRM PARAM NOF/U: HCPCS | Performed by: ORTHOPAEDIC SURGERY

## 2024-12-10 PROCEDURE — G8399 PT W/DXA RESULTS DOCUMENT: HCPCS | Performed by: ORTHOPAEDIC SURGERY

## 2024-12-10 PROCEDURE — 99213 OFFICE O/P EST LOW 20 MIN: CPT | Performed by: ORTHOPAEDIC SURGERY

## 2024-12-10 PROCEDURE — G8427 DOCREV CUR MEDS BY ELIG CLIN: HCPCS | Performed by: ORTHOPAEDIC SURGERY

## 2024-12-10 PROCEDURE — 3017F COLORECTAL CA SCREEN DOC REV: CPT | Performed by: ORTHOPAEDIC SURGERY

## 2024-12-10 PROCEDURE — 1036F TOBACCO NON-USER: CPT | Performed by: ORTHOPAEDIC SURGERY

## 2024-12-10 PROCEDURE — G8484 FLU IMMUNIZE NO ADMIN: HCPCS | Performed by: ORTHOPAEDIC SURGERY

## 2024-12-10 PROCEDURE — 1160F RVW MEDS BY RX/DR IN RCRD: CPT | Performed by: ORTHOPAEDIC SURGERY

## 2024-12-10 NOTE — PROGRESS NOTES
appropriately.    Musculoskeletal: The patient's Range of Motion today was measured at:  Forward Elevation of 85-90.  Abduction of 80-85.  External Rotation of 35.  The patient's strength today is:  External Rotation: 5-.  Abduction: 4.  Belly Press Test: 5  The swelling is minimal. They are neurovascularly intact.    X-ray: Grashey and axillary lateral views of the operative right shoulder were obtained and reviewed today in the office. There has been no change in the hardware's alignment and the glenohumeral position is appropriate on all the films.    AP     ICD-10-CM    1. Arthritis of right shoulder region  M19.011 XR SHOULDER RIGHT (MIN 2 VIEWS)      2. S/P reverse total shoulder arthroplasty, right  Z96.611 XR SHOULDER RIGHT (MIN 2 VIEWS)      3. Entrapment of common peroneal nerve  G57.30 XR SHOULDER RIGHT (MIN 2 VIEWS)        The patient is doing well status post the above mentioned procedure.   They need to continue with their exercises.   If they have any questions or concerns the patient knows that they can call our office at any time.  We did review that I think although she is almost a year out she can still potentially get stronger.  She has an issue with committing to the strengthening exercises but I still think it is possible for her.  Trying to encourage her is much as I can.    Return in about 6 months (around 6/10/2025) for TSA x-ray fu Grashey / Axillary.     Humberto Joy MD  12/10/24

## 2025-02-19 ENCOUNTER — TELEPHONE (OUTPATIENT)
Dept: ORTHOPEDIC SURGERY | Age: 75
End: 2025-02-19

## 2025-02-19 NOTE — TELEPHONE ENCOUNTER
Kera-    Pts   called back and  they  choose  Tuesday 2/25 at  2:15 pm at [address]    Please  add her  in    Thank  you

## 2025-02-20 NOTE — PROGRESS NOTES
Name: Janell Rangel  YOB: 1950  Gender: female  MRN: [id number]    CC:   Chief Complaint   Patient presents with    Follow-up     1 yr. S/P R reverse TSA   DOS 2/26/24   , The patient follows up 1 year(s) status post right TSA.  Right Shoulder Total Arthroplasty Reverse - Right  2/26/2024    History of Present Illness  The patient is a 75-year-old female who presents for right shoulder evaluation.    She reports experiencing severe pain in her right arm, which has occurred 3 to 4 times. The pain is described as aching and stabbing, but she does not experience any burning, tingling, or sensations of pins and needles. Despite some improvement, she continues to experience discomfort. She has been able to perform certain tasks, such as touching her shirt and pants, but with difficulty. She has not used Voltaren gel, although she has it at home. She reports no numbness, tingling, or burning sensations. She expresses concern about the sudden onset of these symptoms, as she had been managing well prior to this episode.  She states the first time it happened she felt like she was reaching into her fridge to  an almost empty gallon jug of tea.      No Known Allergies  Past Medical History:   Diagnosis Date    Arthritis     osteo    Environmental allergies     Hx of blood clots     blood clots in each leg previously    Hypertension     controlled with med    Migraine     Neuropathy     feet/ legs    Psychiatric disorder     pt takes med for anxiety, depression    Right shoulder pain     RLS (restless legs syndrome)     Rosacea     Sleep apnea     wears a cpap    Thromboembolus (HCC) 12/03/2019    DVT-- RIGHT KNEE- s/p replaced--per pt-- \" still there\"-- followed by dr knowles     Past Surgical History:   Procedure Laterality Date    APPENDECTOMY      DILATION AND CURETTAGE OF UTERUS      ORTHOPEDIC SURGERY Right 09/2018    knee replaced    ND UNLISTED PROCEDURE CARDIAC SURGERY  2014     cardiac

## 2025-02-25 ENCOUNTER — OFFICE VISIT (OUTPATIENT)
Dept: ORTHOPEDIC SURGERY | Age: 75
End: 2025-02-25
Payer: MEDICARE

## 2025-02-25 DIAGNOSIS — Z96.611 S/P REVERSE TOTAL SHOULDER ARTHROPLASTY, RIGHT: Primary | ICD-10-CM

## 2025-02-25 PROCEDURE — G8428 CUR MEDS NOT DOCUMENT: HCPCS | Performed by: ORTHOPAEDIC SURGERY

## 2025-02-25 PROCEDURE — 99213 OFFICE O/P EST LOW 20 MIN: CPT | Performed by: ORTHOPAEDIC SURGERY

## 2025-02-25 PROCEDURE — 1123F ACP DISCUSS/DSCN MKR DOCD: CPT | Performed by: ORTHOPAEDIC SURGERY

## 2025-02-25 PROCEDURE — 3017F COLORECTAL CA SCREEN DOC REV: CPT | Performed by: ORTHOPAEDIC SURGERY

## 2025-02-25 PROCEDURE — 1036F TOBACCO NON-USER: CPT | Performed by: ORTHOPAEDIC SURGERY

## 2025-02-25 PROCEDURE — G8399 PT W/DXA RESULTS DOCUMENT: HCPCS | Performed by: ORTHOPAEDIC SURGERY

## 2025-02-25 PROCEDURE — G8421 BMI NOT CALCULATED: HCPCS | Performed by: ORTHOPAEDIC SURGERY

## 2025-02-25 PROCEDURE — 1090F PRES/ABSN URINE INCON ASSESS: CPT | Performed by: ORTHOPAEDIC SURGERY

## 2025-06-02 NOTE — PROGRESS NOTES
Name: Janell Rangel  YOB: 1950  Gender: female  MRN: [id number]    CC:   Chief Complaint   Patient presents with    Follow-up     S/P right reverse TSA DOS 2/26/24      Right Shoulder Total Arthroplasty Reverse - Right  2/26/2024    HPI: Patient presents for recheck of right shoulder.  She is status post right reverse total shoulder arthroplasty on 2/26/24.   History of Present Illness  The patient came in for a check-up on their right shoulder pain. They mentioned that their shoulder has been feeling better and they haven't had the pain they used to get from time to time. They didn't go to physical therapy because of scheduling issues and haven't been using the pulleys at home. They find it hard to move their arm out to the side. They also mentioned feeling something like a pin or needle, but it's not in their breast tissue.    SOCIAL HISTORY  Exercise: Water aerobics        Recall: She reports experiencing severe pain in her right arm, which has occurred 3 to 4 times. The pain is described as aching and stabbing, but she does not experience any burning, tingling, or sensations of pins and needles. Despite some improvement, she continues to experience discomfort. She has been able to perform certain tasks, such as touching her shirt and pants, but with difficulty. She has not used Voltaren gel, although she has it at home. She reports no numbness, tingling, or burning sensations. She expresses concern about the sudden onset of these symptoms, as she had been managing well prior to this episode.  She states the first time it happened she felt like she was reaching into her fridge to  an almost empty gallon jug of tea.    No Known Allergies  Past Medical History:   Diagnosis Date    Arthritis     osteo    Environmental allergies     Hx of blood clots     blood clots in each leg previously    Hypertension     controlled with med    Migraine     Neuropathy     feet/ legs    Psychiatric  yes

## 2025-06-09 ENCOUNTER — HOSPITAL ENCOUNTER (OUTPATIENT)
Dept: MAMMOGRAPHY | Age: 75
Discharge: HOME | End: 2025-06-12
Attending: INTERNAL MEDICINE
Payer: MEDICARE

## 2025-06-09 VITALS — HEIGHT: 61 IN | BODY MASS INDEX: 26.62 KG/M2 | WEIGHT: 141 LBS

## 2025-06-09 DIAGNOSIS — Z12.31 VISIT FOR SCREENING MAMMOGRAM: ICD-10-CM

## 2025-06-09 DIAGNOSIS — N95.9 MENOPAUSAL PROBLEM: ICD-10-CM

## 2025-06-09 PROCEDURE — 77063 BREAST TOMOSYNTHESIS BI: CPT

## 2025-06-09 PROCEDURE — 77080 DXA BONE DENSITY AXIAL: CPT

## 2025-06-10 ENCOUNTER — OFFICE VISIT (OUTPATIENT)
Dept: ORTHOPEDIC SURGERY | Age: 75
End: 2025-06-10
Payer: MEDICARE

## 2025-06-10 DIAGNOSIS — Z96.611 S/P REVERSE TOTAL SHOULDER ARTHROPLASTY, RIGHT: Primary | ICD-10-CM

## 2025-06-10 PROCEDURE — 1036F TOBACCO NON-USER: CPT | Performed by: ORTHOPAEDIC SURGERY

## 2025-06-10 PROCEDURE — 1123F ACP DISCUSS/DSCN MKR DOCD: CPT | Performed by: ORTHOPAEDIC SURGERY

## 2025-06-10 PROCEDURE — G8428 CUR MEDS NOT DOCUMENT: HCPCS | Performed by: ORTHOPAEDIC SURGERY

## 2025-06-10 PROCEDURE — G8399 PT W/DXA RESULTS DOCUMENT: HCPCS | Performed by: ORTHOPAEDIC SURGERY

## 2025-06-10 PROCEDURE — G8419 CALC BMI OUT NRM PARAM NOF/U: HCPCS | Performed by: ORTHOPAEDIC SURGERY

## 2025-06-10 PROCEDURE — 1090F PRES/ABSN URINE INCON ASSESS: CPT | Performed by: ORTHOPAEDIC SURGERY

## 2025-06-10 PROCEDURE — 3017F COLORECTAL CA SCREEN DOC REV: CPT | Performed by: ORTHOPAEDIC SURGERY

## 2025-06-10 PROCEDURE — 99213 OFFICE O/P EST LOW 20 MIN: CPT | Performed by: ORTHOPAEDIC SURGERY

## 2025-06-26 ENCOUNTER — HOSPITAL ENCOUNTER (OUTPATIENT)
Dept: MAMMOGRAPHY | Age: 75
Discharge: HOME | End: 2025-06-29
Attending: INTERNAL MEDICINE
Payer: MEDICARE

## 2025-06-26 DIAGNOSIS — R92.8 ABNORMAL SCREENING MAMMOGRAM: ICD-10-CM

## 2025-06-26 PROCEDURE — 76642 ULTRASOUND BREAST LIMITED: CPT

## 2025-06-26 PROCEDURE — G0279 TOMOSYNTHESIS, MAMMO: HCPCS
